# Patient Record
Sex: FEMALE | Race: WHITE | Employment: OTHER | URBAN - METROPOLITAN AREA
[De-identification: names, ages, dates, MRNs, and addresses within clinical notes are randomized per-mention and may not be internally consistent; named-entity substitution may affect disease eponyms.]

---

## 2017-04-03 RX ORDER — MELOXICAM 15 MG/1
TABLET ORAL
Qty: 90 TABLET | Refills: 1 | Status: SHIPPED | OUTPATIENT
Start: 2017-04-03 | End: 2017-04-21 | Stop reason: SDUPTHER

## 2017-04-18 ENCOUNTER — OFFICE VISIT (OUTPATIENT)
Dept: PRIMARY CARE CLINIC | Age: 52
End: 2017-04-18

## 2017-04-18 VITALS
DIASTOLIC BLOOD PRESSURE: 76 MMHG | HEART RATE: 72 BPM | RESPIRATION RATE: 14 BRPM | HEIGHT: 62 IN | SYSTOLIC BLOOD PRESSURE: 114 MMHG | TEMPERATURE: 98 F | BODY MASS INDEX: 24.01 KG/M2 | WEIGHT: 130.5 LBS

## 2017-04-18 DIAGNOSIS — Z80.0 FAMILY HX OF COLON CANCER: ICD-10-CM

## 2017-04-18 DIAGNOSIS — G89.29 CHRONIC BACK PAIN, UNSPECIFIED BACK LOCATION, UNSPECIFIED BACK PAIN LATERALITY: ICD-10-CM

## 2017-04-18 DIAGNOSIS — Z12.31 ENCOUNTER FOR SCREENING MAMMOGRAM FOR BREAST CANCER: ICD-10-CM

## 2017-04-18 DIAGNOSIS — R63.4 WEIGHT LOSS, NON-INTENTIONAL: ICD-10-CM

## 2017-04-18 DIAGNOSIS — C50.912 MALIGNANT NEOPLASM OF LEFT FEMALE BREAST, UNSPECIFIED SITE OF BREAST: ICD-10-CM

## 2017-04-18 DIAGNOSIS — K63.5 COLON POLYPS: Primary | ICD-10-CM

## 2017-04-18 DIAGNOSIS — R23.3 BRUISES EASILY: ICD-10-CM

## 2017-04-18 DIAGNOSIS — D68.9 COAGULOPATHY (HCC): ICD-10-CM

## 2017-04-18 DIAGNOSIS — R53.83 OTHER FATIGUE: ICD-10-CM

## 2017-04-18 DIAGNOSIS — Z11.4 SCREENING FOR HIV (HUMAN IMMUNODEFICIENCY VIRUS): ICD-10-CM

## 2017-04-18 DIAGNOSIS — Z12.11 SCREENING FOR COLON CANCER: ICD-10-CM

## 2017-04-18 DIAGNOSIS — M54.9 CHRONIC BACK PAIN, UNSPECIFIED BACK LOCATION, UNSPECIFIED BACK PAIN LATERALITY: ICD-10-CM

## 2017-04-18 DIAGNOSIS — D22.9 INTRADERMAL MELANOCYTIC NEVUS: ICD-10-CM

## 2017-04-18 DIAGNOSIS — Z12.4 CERVICAL CANCER SCREENING: ICD-10-CM

## 2017-04-18 DIAGNOSIS — G89.4 CHRONIC PAIN SYNDROME: ICD-10-CM

## 2017-04-18 LAB
ALBUMIN SERPL-MCNC: 4.8 G/DL (ref 3.9–4.9)
ALP BLD-CCNC: 53 U/L (ref 40–130)
ALT SERPL-CCNC: 14 U/L (ref 0–33)
ANION GAP SERPL CALCULATED.3IONS-SCNC: 12 MEQ/L (ref 7–13)
APTT: 25.5 SEC (ref 21.6–35.4)
AST SERPL-CCNC: 23 U/L (ref 0–35)
BASOPHILS ABSOLUTE: 0 K/UL (ref 0–0.2)
BASOPHILS RELATIVE PERCENT: 0.4 %
BILIRUB SERPL-MCNC: 0.4 MG/DL (ref 0–1.2)
BUN BLDV-MCNC: 15 MG/DL (ref 6–20)
CALCIUM SERPL-MCNC: 10.1 MG/DL (ref 8.6–10.2)
CHLORIDE BLD-SCNC: 102 MEQ/L (ref 98–107)
CHOLESTEROL, TOTAL: 282 MG/DL (ref 0–199)
CO2: 29 MEQ/L (ref 22–29)
CREAT SERPL-MCNC: 0.52 MG/DL (ref 0.5–0.9)
EOSINOPHILS ABSOLUTE: 0 K/UL (ref 0–0.7)
EOSINOPHILS RELATIVE PERCENT: 0.8 %
GFR AFRICAN AMERICAN: >60
GFR NON-AFRICAN AMERICAN: >60
GLOBULIN: 2.4 G/DL (ref 2.3–3.5)
GLUCOSE BLD-MCNC: 109 MG/DL (ref 74–109)
HCT VFR BLD CALC: 43.5 % (ref 37–47)
HDLC SERPL-MCNC: 91 MG/DL (ref 40–59)
HEMOGLOBIN: 14.3 G/DL (ref 12–16)
INR BLD: 1
LDL CHOLESTEROL CALCULATED: 169 MG/DL (ref 0–129)
LYMPHOCYTES ABSOLUTE: 1.7 K/UL (ref 1–4.8)
LYMPHOCYTES RELATIVE PERCENT: 32.3 %
MCH RBC QN AUTO: 29.8 PG (ref 27–31.3)
MCHC RBC AUTO-ENTMCNC: 32.9 % (ref 33–37)
MCV RBC AUTO: 90.5 FL (ref 82–100)
MONOCYTES ABSOLUTE: 0.4 K/UL (ref 0.2–0.8)
MONOCYTES RELATIVE PERCENT: 8.2 %
NEUTROPHILS ABSOLUTE: 3.1 K/UL (ref 1.4–6.5)
NEUTROPHILS RELATIVE PERCENT: 58.3 %
PDW BLD-RTO: 14.8 % (ref 11.5–14.5)
PLATELET # BLD: 222 K/UL (ref 130–400)
POTASSIUM SERPL-SCNC: 3.7 MEQ/L (ref 3.5–5.1)
PROTHROMBIN TIME: 10.2 SEC (ref 8.1–13.7)
RBC # BLD: 4.81 M/UL (ref 4.2–5.4)
SODIUM BLD-SCNC: 143 MEQ/L (ref 132–144)
TOTAL PROTEIN: 7.2 G/DL (ref 6.4–8.1)
TRIGL SERPL-MCNC: 112 MG/DL (ref 0–200)
TSH SERPL DL<=0.05 MIU/L-ACNC: 3.03 UIU/ML (ref 0.27–4.2)
VITAMIN D 25-HYDROXY: 43.1 NG/ML (ref 30–100)
WBC # BLD: 5.4 K/UL (ref 4.8–10.8)

## 2017-04-18 PROCEDURE — 99214 OFFICE O/P EST MOD 30 MIN: CPT | Performed by: FAMILY MEDICINE

## 2017-04-18 RX ORDER — GABAPENTIN 600 MG/1
TABLET ORAL
COMMUNITY
End: 2017-05-30 | Stop reason: SDUPTHER

## 2017-04-18 RX ORDER — CYCLOBENZAPRINE HCL 10 MG
10 TABLET ORAL 3 TIMES DAILY PRN
Qty: 90 TABLET | Refills: 5 | Status: SHIPPED | OUTPATIENT
Start: 2017-04-18 | End: 2017-04-21 | Stop reason: SDUPTHER

## 2017-04-18 RX ORDER — DOCUSATE SODIUM 100 MG/1
100 CAPSULE, LIQUID FILLED ORAL 2 TIMES DAILY
COMMUNITY
End: 2018-02-19

## 2017-04-18 ASSESSMENT — ENCOUNTER SYMPTOMS
EYES NEGATIVE: 1
ABDOMINAL PAIN: 0
RESPIRATORY NEGATIVE: 1

## 2017-04-20 LAB — HIV-1 WESTERN BLOT: NEGATIVE

## 2017-04-21 DIAGNOSIS — M54.9 CHRONIC BACK PAIN, UNSPECIFIED BACK LOCATION, UNSPECIFIED BACK PAIN LATERALITY: ICD-10-CM

## 2017-04-21 DIAGNOSIS — G89.29 CHRONIC BACK PAIN, UNSPECIFIED BACK LOCATION, UNSPECIFIED BACK PAIN LATERALITY: ICD-10-CM

## 2017-04-24 RX ORDER — CYCLOBENZAPRINE HCL 10 MG
10 TABLET ORAL 3 TIMES DAILY PRN
Qty: 270 TABLET | Refills: 0 | Status: SHIPPED | OUTPATIENT
Start: 2017-04-24 | End: 2019-08-06

## 2017-04-24 RX ORDER — MELOXICAM 15 MG/1
TABLET ORAL
Qty: 90 TABLET | Refills: 1 | Status: SHIPPED | OUTPATIENT
Start: 2017-04-24 | End: 2017-05-30 | Stop reason: SDUPTHER

## 2017-04-27 ENCOUNTER — HOSPITAL ENCOUNTER (OUTPATIENT)
Dept: CT IMAGING | Age: 52
Discharge: HOME | End: 2017-04-27
Payer: MEDICARE

## 2017-04-27 DIAGNOSIS — R63.4 WEIGHT LOSS, NON-INTENTIONAL: ICD-10-CM

## 2017-04-27 DIAGNOSIS — C50.912 MALIGNANT NEOPLASM OF LEFT FEMALE BREAST, UNSPECIFIED SITE OF BREAST: ICD-10-CM

## 2017-04-27 PROCEDURE — 6360000004 HC RX CONTRAST MEDICATION: Performed by: RADIOLOGY

## 2017-04-27 PROCEDURE — 2500000003 HC RX 250 WO HCPCS: Performed by: RADIOLOGY

## 2017-04-27 PROCEDURE — 74177 CT ABD & PELVIS W/CONTRAST: CPT

## 2017-04-27 RX ADMIN — IOVERSOL 100 ML: 678 INJECTION INTRA-ARTERIAL; INTRAVENOUS at 11:31

## 2017-04-27 RX ADMIN — BARIUM SULFATE 450 ML: 21 SUSPENSION ORAL at 11:31

## 2017-05-01 ENCOUNTER — HOSPITAL ENCOUNTER (OUTPATIENT)
Dept: WOMENS IMAGING | Age: 52
Discharge: HOME | End: 2017-05-01
Payer: MEDICARE

## 2017-05-01 DIAGNOSIS — Z12.31 ENCOUNTER FOR SCREENING MAMMOGRAM FOR BREAST CANCER: ICD-10-CM

## 2017-05-01 PROCEDURE — G0202 SCR MAMMO BI INCL CAD: HCPCS

## 2017-05-04 ENCOUNTER — OFFICE VISIT (OUTPATIENT)
Dept: PRIMARY CARE CLINIC | Age: 52
End: 2017-05-04

## 2017-05-04 VITALS
WEIGHT: 130 LBS | HEIGHT: 62 IN | HEART RATE: 72 BPM | BODY MASS INDEX: 23.92 KG/M2 | TEMPERATURE: 97.9 F | DIASTOLIC BLOOD PRESSURE: 76 MMHG | SYSTOLIC BLOOD PRESSURE: 108 MMHG | RESPIRATION RATE: 12 BRPM

## 2017-05-04 DIAGNOSIS — K29.70 GASTRITIS WITHOUT BLEEDING, UNSPECIFIED CHRONICITY, UNSPECIFIED GASTRITIS TYPE: ICD-10-CM

## 2017-05-04 DIAGNOSIS — R06.2 WHEEZING: ICD-10-CM

## 2017-05-04 DIAGNOSIS — F32.A DEPRESSION, UNSPECIFIED DEPRESSION TYPE: ICD-10-CM

## 2017-05-04 DIAGNOSIS — J40 BRONCHITIS: ICD-10-CM

## 2017-05-04 DIAGNOSIS — J30.9 ALLERGIC RHINITIS, UNSPECIFIED ALLERGIC RHINITIS TRIGGER, UNSPECIFIED RHINITIS SEASONALITY: Primary | ICD-10-CM

## 2017-05-04 PROCEDURE — 99213 OFFICE O/P EST LOW 20 MIN: CPT | Performed by: FAMILY MEDICINE

## 2017-05-04 PROCEDURE — G0444 DEPRESSION SCREEN ANNUAL: HCPCS | Performed by: FAMILY MEDICINE

## 2017-05-04 RX ORDER — ALBUTEROL SULFATE 90 UG/1
2 AEROSOL, METERED RESPIRATORY (INHALATION) EVERY 6 HOURS PRN
Qty: 1 INHALER | Refills: 3 | Status: SHIPPED | OUTPATIENT
Start: 2017-05-04 | End: 2018-02-19 | Stop reason: SDUPTHER

## 2017-05-04 RX ORDER — CEFUROXIME AXETIL 500 MG/1
500 TABLET ORAL 2 TIMES DAILY
Qty: 20 TABLET | Refills: 0 | Status: SHIPPED | OUTPATIENT
Start: 2017-05-04 | End: 2017-05-14

## 2017-05-04 RX ORDER — MONTELUKAST SODIUM 10 MG/1
TABLET ORAL
Qty: 90 TABLET | Refills: 3 | Status: SHIPPED | OUTPATIENT
Start: 2017-05-04 | End: 2018-02-19 | Stop reason: SDUPTHER

## 2017-05-04 RX ORDER — MONTELUKAST SODIUM 10 MG/1
10 TABLET ORAL NIGHTLY
Qty: 30 TABLET | Refills: 3 | Status: SHIPPED | OUTPATIENT
Start: 2017-05-04 | End: 2017-05-04 | Stop reason: SDUPTHER

## 2017-05-04 ASSESSMENT — PATIENT HEALTH QUESTIONNAIRE - PHQ9
6. FEELING BAD ABOUT YOURSELF - OR THAT YOU ARE A FAILURE OR HAVE LET YOURSELF OR YOUR FAMILY DOWN: 3
1. LITTLE INTEREST OR PLEASURE IN DOING THINGS: 3
10. IF YOU CHECKED OFF ANY PROBLEMS, HOW DIFFICULT HAVE THESE PROBLEMS MADE IT FOR YOU TO DO YOUR WORK, TAKE CARE OF THINGS AT HOME, OR GET ALONG WITH OTHER PEOPLE: 3
9. THOUGHTS THAT YOU WOULD BE BETTER OFF DEAD, OR OF HURTING YOURSELF: 2
2. FEELING DOWN, DEPRESSED OR HOPELESS: 3
4. FEELING TIRED OR HAVING LITTLE ENERGY: 3
5. POOR APPETITE OR OVEREATING: 3
SUM OF ALL RESPONSES TO PHQ QUESTIONS 1-9: 24
7. TROUBLE CONCENTRATING ON THINGS, SUCH AS READING THE NEWSPAPER OR WATCHING TELEVISION: 3
3. TROUBLE FALLING OR STAYING ASLEEP: 3
8. MOVING OR SPEAKING SO SLOWLY THAT OTHER PEOPLE COULD HAVE NOTICED. OR THE OPPOSITE, BEING SO FIGETY OR RESTLESS THAT YOU HAVE BEEN MOVING AROUND A LOT MORE THAN USUAL: 1
SUM OF ALL RESPONSES TO PHQ9 QUESTIONS 1 & 2: 6

## 2017-05-04 ASSESSMENT — ENCOUNTER SYMPTOMS
RHINORRHEA: 1
ABDOMINAL PAIN: 1
EYES NEGATIVE: 1
COUGH: 1
SINUS PRESSURE: 1

## 2017-05-09 ENCOUNTER — OFFICE VISIT (OUTPATIENT)
Dept: GASTROENTEROLOGY | Age: 52
End: 2017-05-09

## 2017-05-09 VITALS
DIASTOLIC BLOOD PRESSURE: 78 MMHG | HEART RATE: 76 BPM | BODY MASS INDEX: 23.05 KG/M2 | SYSTOLIC BLOOD PRESSURE: 106 MMHG | WEIGHT: 126 LBS

## 2017-05-09 DIAGNOSIS — R93.5 ABNORMAL CT OF THE ABDOMEN: ICD-10-CM

## 2017-05-09 DIAGNOSIS — R63.4 WEIGHT LOSS: ICD-10-CM

## 2017-05-09 DIAGNOSIS — R63.0 ANOREXIA: Primary | ICD-10-CM

## 2017-05-09 PROCEDURE — 99203 OFFICE O/P NEW LOW 30 MIN: CPT | Performed by: INTERNAL MEDICINE

## 2017-05-10 PROBLEM — M47.817 LUMBOSACRAL SPONDYLOSIS WITHOUT MYELOPATHY: Status: ACTIVE | Noted: 2017-05-10

## 2017-05-10 PROBLEM — M54.16 LUMBAR RADICULOPATHY: Status: ACTIVE | Noted: 2017-05-10

## 2017-05-10 PROBLEM — M96.1 POSTLAMINECTOMY SYNDROME, LUMBAR: Status: ACTIVE | Noted: 2017-05-10

## 2017-05-11 ENCOUNTER — ANESTHESIA EVENT (OUTPATIENT)
Dept: ENDOSCOPY | Age: 52
End: 2017-05-11
Payer: MEDICARE

## 2017-05-11 ENCOUNTER — ANESTHESIA (OUTPATIENT)
Dept: ENDOSCOPY | Age: 52
End: 2017-05-11
Payer: MEDICARE

## 2017-05-11 ENCOUNTER — HOSPITAL ENCOUNTER (OUTPATIENT)
Age: 52
Setting detail: OUTPATIENT SURGERY
Discharge: HOME | End: 2017-05-11
Attending: INTERNAL MEDICINE | Admitting: INTERNAL MEDICINE
Payer: MEDICARE

## 2017-05-11 VITALS
OXYGEN SATURATION: 100 % | SYSTOLIC BLOOD PRESSURE: 110 MMHG | RESPIRATION RATE: 16 BRPM | DIASTOLIC BLOOD PRESSURE: 70 MMHG

## 2017-05-11 VITALS
DIASTOLIC BLOOD PRESSURE: 75 MMHG | WEIGHT: 122 LBS | BODY MASS INDEX: 21.62 KG/M2 | OXYGEN SATURATION: 96 % | SYSTOLIC BLOOD PRESSURE: 99 MMHG | HEART RATE: 71 BPM | TEMPERATURE: 97.9 F | HEIGHT: 63 IN | RESPIRATION RATE: 16 BRPM

## 2017-05-11 PROCEDURE — 3609017100 HC EGD: Performed by: INTERNAL MEDICINE

## 2017-05-11 PROCEDURE — 3700000001 HC ADD 15 MINUTES (ANESTHESIA): Performed by: INTERNAL MEDICINE

## 2017-05-11 PROCEDURE — 3700000000 HC ANESTHESIA ATTENDED CARE: Performed by: INTERNAL MEDICINE

## 2017-05-11 PROCEDURE — 88342 IMHCHEM/IMCYTCHM 1ST ANTB: CPT

## 2017-05-11 PROCEDURE — 2580000003 HC RX 258: Performed by: STUDENT IN AN ORGANIZED HEALTH CARE EDUCATION/TRAINING PROGRAM

## 2017-05-11 PROCEDURE — 88305 TISSUE EXAM BY PATHOLOGIST: CPT

## 2017-05-11 PROCEDURE — 2580000003 HC RX 258: Performed by: NURSE ANESTHETIST, CERTIFIED REGISTERED

## 2017-05-11 PROCEDURE — 7100000010 HC PHASE II RECOVERY - FIRST 15 MIN: Performed by: INTERNAL MEDICINE

## 2017-05-11 PROCEDURE — 6360000002 HC RX W HCPCS: Performed by: NURSE ANESTHETIST, CERTIFIED REGISTERED

## 2017-05-11 RX ORDER — ONDANSETRON 2 MG/ML
4 INJECTION INTRAMUSCULAR; INTRAVENOUS
Status: DISCONTINUED | OUTPATIENT
Start: 2017-05-11 | End: 2017-05-11 | Stop reason: HOSPADM

## 2017-05-11 RX ORDER — SODIUM CHLORIDE 9 MG/ML
INJECTION, SOLUTION INTRAVENOUS CONTINUOUS
Status: DISCONTINUED | OUTPATIENT
Start: 2017-05-11 | End: 2017-05-11 | Stop reason: HOSPADM

## 2017-05-11 RX ORDER — SODIUM CHLORIDE 9 MG/ML
INJECTION, SOLUTION INTRAVENOUS CONTINUOUS PRN
Status: DISCONTINUED | OUTPATIENT
Start: 2017-05-11 | End: 2017-05-11 | Stop reason: SDUPTHER

## 2017-05-11 RX ORDER — PROPOFOL 10 MG/ML
INJECTION, EMULSION INTRAVENOUS PRN
Status: DISCONTINUED | OUTPATIENT
Start: 2017-05-11 | End: 2017-05-11 | Stop reason: SDUPTHER

## 2017-05-11 RX ADMIN — SODIUM CHLORIDE: 9 INJECTION, SOLUTION INTRAVENOUS at 07:27

## 2017-05-11 RX ADMIN — PROPOFOL 20 MG: 10 INJECTION, EMULSION INTRAVENOUS at 08:16

## 2017-05-11 RX ADMIN — PROPOFOL 20 MG: 10 INJECTION, EMULSION INTRAVENOUS at 08:15

## 2017-05-11 RX ADMIN — PROPOFOL 20 MG: 10 INJECTION, EMULSION INTRAVENOUS at 08:13

## 2017-05-11 RX ADMIN — PROPOFOL 50 MG: 10 INJECTION, EMULSION INTRAVENOUS at 08:09

## 2017-05-11 RX ADMIN — PROPOFOL 50 MG: 10 INJECTION, EMULSION INTRAVENOUS at 08:10

## 2017-05-11 RX ADMIN — PROPOFOL 20 MG: 10 INJECTION, EMULSION INTRAVENOUS at 08:12

## 2017-05-11 RX ADMIN — PROPOFOL 20 MG: 10 INJECTION, EMULSION INTRAVENOUS at 08:14

## 2017-05-11 RX ADMIN — SODIUM CHLORIDE: 900 INJECTION, SOLUTION INTRAVENOUS at 08:06

## 2017-05-11 RX ADMIN — PROPOFOL 30 MG: 10 INJECTION, EMULSION INTRAVENOUS at 08:11

## 2017-05-11 ASSESSMENT — PAIN - FUNCTIONAL ASSESSMENT: PAIN_FUNCTIONAL_ASSESSMENT: 0-10

## 2017-05-11 ASSESSMENT — PAIN DESCRIPTION - DESCRIPTORS: DESCRIPTORS: ACHING

## 2017-05-15 ENCOUNTER — OFFICE VISIT (OUTPATIENT)
Dept: OBGYN | Age: 52
End: 2017-05-15

## 2017-05-15 VITALS
DIASTOLIC BLOOD PRESSURE: 76 MMHG | SYSTOLIC BLOOD PRESSURE: 100 MMHG | BODY MASS INDEX: 22.82 KG/M2 | HEIGHT: 62 IN | WEIGHT: 124 LBS

## 2017-05-15 DIAGNOSIS — Z01.419 WELL WOMAN EXAM WITH ROUTINE GYNECOLOGICAL EXAM: Primary | ICD-10-CM

## 2017-05-15 DIAGNOSIS — Z11.3 SCREENING FOR STD (SEXUALLY TRANSMITTED DISEASE): ICD-10-CM

## 2017-05-15 DIAGNOSIS — Z11.51 SCREENING FOR HPV (HUMAN PAPILLOMAVIRUS): ICD-10-CM

## 2017-05-15 LAB — PAP SMEAR: NORMAL

## 2017-05-15 PROCEDURE — G0101 CA SCREEN;PELVIC/BREAST EXAM: HCPCS | Performed by: OBSTETRICS & GYNECOLOGY

## 2017-05-15 RX ORDER — NICOTINE POLACRILEX 4 MG/1
20 GUM, CHEWING ORAL DAILY
Qty: 90 TABLET | Refills: 4 | Status: SHIPPED | OUTPATIENT
Start: 2017-05-15 | End: 2017-06-08 | Stop reason: SDUPTHER

## 2017-05-17 LAB — HIV-1 AND HIV-2 ANTIBODIES: NEGATIVE

## 2017-05-30 DIAGNOSIS — Z11.51 SCREENING FOR HPV (HUMAN PAPILLOMAVIRUS): ICD-10-CM

## 2017-05-30 DIAGNOSIS — Z01.419 WELL WOMAN EXAM WITH ROUTINE GYNECOLOGICAL EXAM: ICD-10-CM

## 2017-06-08 RX ORDER — NICOTINE POLACRILEX 4 MG/1
20 GUM, CHEWING ORAL 2 TIMES DAILY
Qty: 180 TABLET | Refills: 4 | Status: SHIPPED | OUTPATIENT
Start: 2017-06-08 | End: 2018-02-19 | Stop reason: SDUPTHER

## 2017-06-14 ENCOUNTER — TELEPHONE (OUTPATIENT)
Dept: OBGYN | Age: 52
End: 2017-06-14

## 2017-06-15 ENCOUNTER — HOSPITAL ENCOUNTER (OUTPATIENT)
Dept: NUCLEAR MEDICINE | Age: 52
Discharge: HOME | End: 2017-06-15
Payer: MEDICARE

## 2017-06-15 ENCOUNTER — HOSPITAL ENCOUNTER (OUTPATIENT)
Dept: GENERAL RADIOLOGY | Age: 52
Discharge: HOME | End: 2017-06-15
Payer: MEDICARE

## 2017-06-15 ENCOUNTER — HOSPITAL ENCOUNTER (OUTPATIENT)
Dept: NUCLEAR MEDICINE | Age: 52
End: 2017-06-15
Payer: MEDICARE

## 2017-06-15 DIAGNOSIS — M54.5 CHRONIC LOW BACK PAIN, UNSPECIFIED BACK PAIN LATERALITY, WITH SCIATICA PRESENCE UNSPECIFIED: ICD-10-CM

## 2017-06-15 DIAGNOSIS — G89.29 CHRONIC LOW BACK PAIN, UNSPECIFIED BACK PAIN LATERALITY, WITH SCIATICA PRESENCE UNSPECIFIED: ICD-10-CM

## 2017-06-15 DIAGNOSIS — Z85.3 HISTORY OF LEFT BREAST CANCER: ICD-10-CM

## 2017-06-15 PROCEDURE — 78306 BONE IMAGING WHOLE BODY: CPT

## 2017-06-15 PROCEDURE — A9503 TC99M MEDRONATE: HCPCS | Performed by: INTERNAL MEDICINE

## 2017-06-15 PROCEDURE — 77080 DXA BONE DENSITY AXIAL: CPT

## 2017-06-15 PROCEDURE — 3430000000 HC RX DIAGNOSTIC RADIOPHARMACEUTICAL: Performed by: INTERNAL MEDICINE

## 2017-06-15 RX ORDER — TC 99M MEDRONATE 20 MG/10ML
30 INJECTION, POWDER, LYOPHILIZED, FOR SOLUTION INTRAVENOUS
Status: COMPLETED | OUTPATIENT
Start: 2017-06-15 | End: 2017-06-15

## 2017-06-15 RX ADMIN — Medication 30 MILLICURIE: at 11:30

## 2017-07-21 ENCOUNTER — HOSPITAL ENCOUNTER (OUTPATIENT)
Dept: CT IMAGING | Age: 52
Discharge: HOME | End: 2017-07-21
Payer: MEDICARE

## 2017-07-21 DIAGNOSIS — C50.912 MALIGNANT NEOPLASM OF LEFT FEMALE BREAST, UNSPECIFIED SITE OF BREAST: ICD-10-CM

## 2017-07-21 PROCEDURE — 71250 CT THORAX DX C-: CPT

## 2017-08-24 ENCOUNTER — APPOINTMENT (OUTPATIENT)
Dept: NUCLEAR MEDICINE | Age: 52
End: 2017-08-24
Payer: MEDICARE

## 2017-08-24 ENCOUNTER — HOSPITAL ENCOUNTER (OUTPATIENT)
Dept: NUCLEAR MEDICINE | Age: 52
Discharge: HOME | End: 2017-08-24
Payer: MEDICARE

## 2017-08-24 DIAGNOSIS — C50.912 MALIGNANT NEOPLASM OF LEFT FEMALE BREAST, UNSPECIFIED SITE OF BREAST: ICD-10-CM

## 2017-08-24 PROCEDURE — 78306 BONE IMAGING WHOLE BODY: CPT

## 2017-08-24 PROCEDURE — A9503 TC99M MEDRONATE: HCPCS | Performed by: INTERNAL MEDICINE

## 2017-08-24 PROCEDURE — 3430000000 HC RX DIAGNOSTIC RADIOPHARMACEUTICAL: Performed by: INTERNAL MEDICINE

## 2017-08-24 RX ORDER — TC 99M MEDRONATE 20 MG/10ML
25 INJECTION, POWDER, LYOPHILIZED, FOR SOLUTION INTRAVENOUS
Status: COMPLETED | OUTPATIENT
Start: 2017-08-24 | End: 2017-08-24

## 2017-08-24 RX ADMIN — Medication 25 MILLICURIE: at 11:15

## 2017-10-31 RX ORDER — HYDROCHLOROTHIAZIDE 25 MG/1
TABLET ORAL
Qty: 45 TABLET | Refills: 1 | Status: SHIPPED | OUTPATIENT
Start: 2017-10-31 | End: 2018-02-19 | Stop reason: SDUPTHER

## 2018-02-19 ENCOUNTER — OFFICE VISIT (OUTPATIENT)
Dept: PRIMARY CARE CLINIC | Age: 53
End: 2018-02-19
Payer: COMMERCIAL

## 2018-02-19 VITALS
HEART RATE: 74 BPM | SYSTOLIC BLOOD PRESSURE: 118 MMHG | HEIGHT: 63 IN | TEMPERATURE: 97.6 F | BODY MASS INDEX: 21.62 KG/M2 | WEIGHT: 122 LBS | DIASTOLIC BLOOD PRESSURE: 82 MMHG | RESPIRATION RATE: 16 BRPM

## 2018-02-19 DIAGNOSIS — R06.2 WHEEZING: ICD-10-CM

## 2018-02-19 DIAGNOSIS — R06.02 SHORTNESS OF BREATH: ICD-10-CM
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
## 2018-02-19 DIAGNOSIS — I10 ESSENTIAL HYPERTENSION: ICD-10-CM

## 2018-02-19 DIAGNOSIS — H69.81 DYSFUNCTION OF RIGHT EUSTACHIAN TUBE: ICD-10-CM

## 2018-02-19 DIAGNOSIS — R07.81 RIB PAIN ON LEFT SIDE: ICD-10-CM

## 2018-02-19 DIAGNOSIS — F32.5 MAJOR DEPRESSION, SINGLE EPISODE, IN COMPLETE REMISSION (HCC): ICD-10-CM

## 2018-02-19 PROCEDURE — 99214 OFFICE O/P EST MOD 30 MIN: CPT | Performed by: FAMILY MEDICINE

## 2018-02-19 RX ORDER — NICOTINE POLACRILEX 4 MG/1
20 GUM, CHEWING ORAL 2 TIMES DAILY
Qty: 180 TABLET | Refills: 4 | Status: SHIPPED | OUTPATIENT
Start: 2018-02-19 | End: 2018-04-17 | Stop reason: SDUPTHER

## 2018-02-19 RX ORDER — ALBUTEROL SULFATE 90 UG/1
2 AEROSOL, METERED RESPIRATORY (INHALATION) EVERY 6 HOURS PRN
Qty: 1 INHALER | Refills: 3 | Status: SHIPPED | OUTPATIENT
Start: 2018-02-19 | End: 2019-02-21 | Stop reason: SDUPTHER

## 2018-02-19 RX ORDER — MELOXICAM 15 MG/1
15 TABLET ORAL DAILY
Qty: 90 TABLET | Refills: 0 | Status: SHIPPED | OUTPATIENT
Start: 2018-02-19 | End: 2018-03-05 | Stop reason: ALTCHOICE

## 2018-02-19 RX ORDER — HYDROCHLOROTHIAZIDE 25 MG/1
TABLET ORAL
Qty: 45 TABLET | Refills: 1 | Status: SHIPPED | OUTPATIENT
Start: 2018-02-19 | End: 2018-08-16 | Stop reason: SDUPTHER

## 2018-02-19 RX ORDER — MONTELUKAST SODIUM 10 MG/1
TABLET ORAL
Qty: 90 TABLET | Refills: 3 | Status: SHIPPED | OUTPATIENT
Start: 2018-02-19 | End: 2019-02-21 | Stop reason: SDUPTHER

## 2018-02-19 RX ORDER — LISINOPRIL 20 MG/1
10 TABLET ORAL DAILY
Qty: 90 TABLET | Refills: 3 | Status: SHIPPED | OUTPATIENT
Start: 2018-02-19 | End: 2019-02-21 | Stop reason: SDUPTHER

## 2018-02-19 RX ORDER — AZELASTINE 1 MG/ML
2 SPRAY, METERED NASAL 2 TIMES DAILY
Qty: 1 BOTTLE | Refills: 3 | Status: SHIPPED | OUTPATIENT
Start: 2018-02-19 | End: 2018-05-14

## 2018-02-19 RX ORDER — DICLOFENAC SODIUM 75 MG/1
75 TABLET, DELAYED RELEASE ORAL 2 TIMES DAILY
COMMUNITY
End: 2019-02-21 | Stop reason: SDUPTHER

## 2018-02-19 RX ORDER — GABAPENTIN 600 MG/1
TABLET ORAL
Qty: 120 TABLET | Refills: 2 | Status: SHIPPED | OUTPATIENT
Start: 2018-02-19 | End: 2018-06-22 | Stop reason: SDUPTHER

## 2018-02-19 ASSESSMENT — ENCOUNTER SYMPTOMS
ABDOMINAL PAIN: 0
BOWEL INCONTINENCE: 0
EYE DISCHARGE: 0
APNEA: 0
EYE PAIN: 0
CONSTIPATION: 0
WHEEZING: 0
FACIAL SWELLING: 0
STRIDOR: 0
DIARRHEA: 0
CHEST TIGHTNESS: 0
COLOR CHANGE: 0
NAUSEA: 0
CHOKING: 0
PHOTOPHOBIA: 0
VOMITING: 0
VISUAL CHANGE: 0
EYE REDNESS: 0

## 2018-02-19 NOTE — PROGRESS NOTES
hematuria. Musculoskeletal: Negative for gait problem, joint swelling, neck pain and neck stiffness. Skin: Negative for color change, pallor, rash and wound. Allergic/Immunologic: Negative for immunocompromised state. Neurological: Negative for tingling, tremors, loss of consciousness, syncope, facial asymmetry, speech difficulty, numbness and headaches. Psychiatric/Behavioral: Negative for confusion and hallucinations. The patient is not nervous/anxious and is not hyperactive. Objective:   /82 (Site: Right Arm, Position: Sitting, Cuff Size: Medium Adult)   Pulse 74   Temp 97.6 °F (36.4 °C) (Tympanic)   Resp 16   Ht 5' 3\" (1.6 m)   Wt 122 lb (55.3 kg)   Breastfeeding? No   BMI 21.61 kg/m²     Physical Exam   Constitutional: She is oriented to person, place, and time. She appears well-developed and well-nourished. No distress. HENT:   Head: Normocephalic and atraumatic. Right Ear: Hearing and external ear normal. No drainage, swelling or tenderness. Tympanic membrane is not injected and not bulging. A middle ear effusion is present. No hemotympanum. Left Ear: Hearing, tympanic membrane and external ear normal. No drainage, swelling or tenderness. Tympanic membrane is not injected and not bulging. No hemotympanum. Nose: No mucosal edema, rhinorrhea, sinus tenderness or nasal deformity. No epistaxis. Right sinus exhibits no maxillary sinus tenderness. Left sinus exhibits no maxillary sinus tenderness. Mouth/Throat: Uvula is midline and mucous membranes are normal. No oropharyngeal exudate or posterior oropharyngeal erythema. Eyes: Conjunctivae and EOM are normal. Pupils are equal, round, and reactive to light. Right eye exhibits no discharge. Left eye exhibits no discharge. Neck: Trachea normal. No JVD present. No thyroid mass and no thyromegaly present. Cardiovascular: Normal rate, regular rhythm, normal heart sounds and intact distal pulses.   Exam reveals no gallop and no friction rub. No murmur heard. Pulmonary/Chest: Effort normal. No respiratory distress. She has wheezes. She has no rales. She exhibits tenderness. Lymphadenopathy:     She has no cervical adenopathy. Neurological: She is alert and oriented to person, place, and time. Coordination normal.   Skin: Skin is warm and dry. She is not diaphoretic. Psychiatric: She has a normal mood and affect. Her behavior is normal. Judgment and thought content normal.   Nursing note and vitals reviewed. Assessment:     1. Fall, initial encounter, 2/10/18     2. Rib pain on left side  XR RIBS LEFT (2 VIEWS)   3. Shortness of breath  XR RIBS LEFT (2 VIEWS)   4. Wheezing  XR RIBS LEFT (2 VIEWS)   5. Dysfunction of right eustachian tube  azelastine (ASTELIN) 0.1 % nasal spray   6.  Essential hypertension  lisinopril (PRINIVIL;ZESTRIL) 20 MG tablet       Plan:      Orders Placed This Encounter   Procedures    XR RIBS LEFT (2 VIEWS)     Standing Status:   Future     Standing Expiration Date:   2/19/2019     Order Specific Question:   Reason for exam:     Answer:   left sided rib pain     Orders Placed This Encounter   Medications    lisinopril (PRINIVIL;ZESTRIL) 20 MG tablet     Sig: Take 0.5 tablets by mouth daily     Dispense:  90 tablet     Refill:  3    montelukast (SINGULAIR) 10 MG tablet     Sig: TAKE 1 TABLET BY MOUTH EVERY NIGHT     Dispense:  90 tablet     Refill:  3     **Patient requests 90 days supply**    albuterol sulfate HFA (PROAIR HFA) 108 (90 Base) MCG/ACT inhaler     Sig: Inhale 2 puffs into the lungs every 6 hours as needed for Wheezing     Dispense:  1 Inhaler     Refill:  3    meloxicam (MOBIC) 15 MG tablet     Sig: Take 1 tablet by mouth daily TAKE 1 TABLET BY MOUTH DAILY     Dispense:  90 tablet     Refill:  0     **Patient requests 90 days supply**    omeprazole 20 MG EC tablet     Sig: Take 1 tablet by mouth 2 times daily     Dispense:  180 tablet     Refill:  4    gabapentin (NEURONTIN) 600

## 2018-03-05 ENCOUNTER — OFFICE VISIT (OUTPATIENT)
Dept: PRIMARY CARE CLINIC | Age: 53
End: 2018-03-05
Payer: COMMERCIAL

## 2018-03-05 VITALS
HEIGHT: 63 IN | SYSTOLIC BLOOD PRESSURE: 108 MMHG | BODY MASS INDEX: 21.26 KG/M2 | DIASTOLIC BLOOD PRESSURE: 72 MMHG | HEART RATE: 84 BPM | TEMPERATURE: 98.1 F | RESPIRATION RATE: 14 BRPM | WEIGHT: 120 LBS

## 2018-03-05 DIAGNOSIS — Z12.11 SCREEN FOR COLON CANCER: ICD-10-CM

## 2018-03-05 DIAGNOSIS — R10.11 RIGHT UPPER QUADRANT ABDOMINAL PAIN: ICD-10-CM

## 2018-03-05 DIAGNOSIS — H69.81 DYSFUNCTION OF RIGHT EUSTACHIAN TUBE: ICD-10-CM
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
## 2018-03-05 DIAGNOSIS — S22.32XD CLOSED FRACTURE OF ONE RIB OF LEFT SIDE WITH ROUTINE HEALING, SUBSEQUENT ENCOUNTER: ICD-10-CM

## 2018-03-05 DIAGNOSIS — R07.81 RIB PAIN ON LEFT SIDE: ICD-10-CM

## 2018-03-05 DIAGNOSIS — R06.02 SHORTNESS OF BREATH: ICD-10-CM

## 2018-03-05 PROCEDURE — 99214 OFFICE O/P EST MOD 30 MIN: CPT | Performed by: FAMILY MEDICINE

## 2018-03-05 ASSESSMENT — ENCOUNTER SYMPTOMS
VOMITING: 0
FACIAL SWELLING: 0
CHOKING: 0
PHOTOPHOBIA: 0
EYE REDNESS: 0
HEMOPTYSIS: 0
ORTHOPNEA: 0
COUGH: 0
SORE THROAT: 1
ABDOMINAL PAIN: 0
SPUTUM PRODUCTION: 0
CONSTIPATION: 0
NAUSEA: 0
BACK PAIN: 0
APNEA: 0
STRIDOR: 0
DIARRHEA: 0
WHEEZING: 0
SHORTNESS OF BREATH: 0
COLOR CHANGE: 0
EYE DISCHARGE: 0
RHINORRHEA: 0
EYE PAIN: 0
CHEST TIGHTNESS: 0

## 2018-03-05 NOTE — PROGRESS NOTES
Subjective:      Patient ID: Houston Camacho is a 47 y.o. female who presents today for:  Chief Complaint   Patient presents with    Chest Pain     f/u on left rib pain caused from fall. Patient would like to review xray done 02/20/18. Patient states her pain has improved but hears popping when taking a deep breath.  Otalgia     f/u on right ear ETD. Patient was started on Astelin nasal spray last visit. She states she does not hear the fullness anymore but still has some tenderness. She c/o popping intermittently as well as post nasal drip and fatigue. Chest Pain    This is a recurrent problem. The current episode started 1 to 4 weeks ago. The problem occurs daily. The problem has been gradually improving. The pain is at a severity of 2/10. The pain is mild. Pertinent negatives include no abdominal pain, back pain, claudication, cough, diaphoresis, dizziness, exertional chest pressure, fever, headaches, hemoptysis, irregular heartbeat, leg pain, lower extremity edema, malaise/fatigue, nausea, near-syncope, numbness, orthopnea, palpitations, PND, shortness of breath, sputum production, syncope, vomiting or weakness. The pain is aggravated by breathing and deep breathing. Treatments tried: voltaren. The treatment provided mild relief. Otalgia    There is pain in the right ear. This is a recurrent problem. The current episode started 1 to 4 weeks ago. The problem occurs every few minutes. The problem has been gradually improving. There has been no fever. The pain is at a severity of 5/10. The pain is moderate. Associated symptoms include a sore throat. Pertinent negatives include no abdominal pain, coughing, diarrhea, ear discharge, headaches, hearing loss, neck pain, rash, rhinorrhea or vomiting. Treatments tried: singulair, astelin nasal spray. The treatment provided mild relief. Her left rib x-ray from 2/20/18 was reviewed with her today.      Past Medical History:   Diagnosis Date    Anxiety     Bronchitis     Cancer ([facility] [address]     left breast    Chronic pain syndrome 10/6/2016    Colon polyps     Depression     suicide attempts    Duodenitis     Esophageal erosions     Family hx of colon cancer, Mother & Father 4/18/2017    Fibromyalgia     Foot deformity 10/6/2016    GERD (gastroesophageal reflux disease)     gastritis    Hypertension     Lumbar radiculopathy 5/10/2017    Osteoarthritis     TMJ arthritis 10/6/2016     Past Surgical History:   Procedure Laterality Date    BREAST SURGERY      breast bx/ lumpectomy    COLONOSCOPY      DILATION AND CURETTAGE OF UTERUS      ENDOSCOPY, COLON, DIAGNOSTIC      HYSTERECTOMY  2012    LAPAROSCOPY      LUMBAR DISCECTOMY      L4-L5    CO ESOPHAGOGASTRODUODENOSCOPY TRANSORAL DIAGNOSTIC N/A 5/11/2017    EGD ESOPHAGOGASTRODUODENOS[facility] [address] [facility] [address] [address]      UPPER GASTROINTESTINAL ENDOSCOPY       Family History   Problem Relation Age of Onset    Cancer Mother      colon    High Blood Pressure Mother     Heart Disease Mother     Cancer Father      colon    High Blood Pressure Father      Social History     Social History    Marital status:      Spouse name: N/A    Number of children: N/A    Years of education: N/A     Occupational History    Not on file. Social History Main Topics    Smoking status: Current Every Day Smoker     Types: Cigarettes     Last attempt to quit: 8/18/1993    Smokeless tobacco: Never Used    Alcohol use 0.0 oz/week      Comment: once in a while    Drug use: No    Sexual activity: Yes     Other Topics Concern    Not on file     Social History Narrative    No narrative on file     Allergies:  Ultram [tramadol]    Review of Systems   Constitutional: Positive for fatigue. Negative for activity change, appetite change, chills, diaphoresis, fever and malaise/fatigue.    HENT: Positive for ear pain, postnasal drip and sore throat. Negative for congestion, ear discharge, facial swelling, hearing loss, mouth sores and rhinorrhea. Eyes: Negative for photophobia, pain, discharge and redness. Respiratory: Negative for apnea, cough, hemoptysis, sputum production, choking, chest tightness, shortness of breath, wheezing and stridor. Cardiovascular: Positive for chest pain. Negative for palpitations, orthopnea, claudication, leg swelling, syncope, PND and near-syncope. Gastrointestinal: Negative for abdominal pain, constipation, diarrhea, nausea and vomiting. Endocrine: Negative for cold intolerance, heat intolerance, polydipsia and polyphagia. Genitourinary: Negative for dysuria and frequency. Musculoskeletal: Negative for back pain, gait problem, joint swelling, neck pain and neck stiffness. Skin: Negative for color change, pallor, rash and wound. Allergic/Immunologic: Negative for immunocompromised state. Neurological: Negative for dizziness, tremors, syncope, facial asymmetry, speech difficulty, weakness, numbness and headaches. Psychiatric/Behavioral: Negative for confusion and hallucinations. The patient is not nervous/anxious and is not hyperactive. Objective:   /72 (Site: Right Arm, Position: Sitting, Cuff Size: Medium Adult)   Pulse 84   Temp 98.1 °F (36.7 °C) (Oral)   Resp 14   Ht 5' 3\" (1.6 m)   Wt 120 lb (54.4 kg)   BMI 21.26 kg/m²     Physical Exam   Constitutional: She is oriented to person, place, and time. She appears well-developed and well-nourished. HENT:   Head: Normocephalic and atraumatic. Right Ear: Hearing, external ear and ear canal normal. No drainage or tenderness. A middle ear effusion is present. Left Ear: Hearing, tympanic membrane, external ear and ear canal normal. No drainage. Mouth/Throat: Oropharynx is clear and moist. No oropharyngeal exudate. Eyes: Conjunctivae and EOM are normal. Pupils are equal, round, and reactive to light.  Right eye exhibits no discharge. Left eye exhibits no discharge. No scleral icterus. Neck: Normal range of motion. Neck supple. No tracheal deviation present. No thyromegaly present. Cardiovascular: Normal rate, regular rhythm, normal heart sounds and intact distal pulses. Exam reveals no gallop and no friction rub. No murmur heard. Pulmonary/Chest: Effort normal and breath sounds normal. No respiratory distress. She has no wheezes. She has no rales. She exhibits no tenderness. Abdominal: Soft. Bowel sounds are normal. She exhibits no distension and no mass. There is tenderness in the right upper quadrant. There is no rebound and no guarding. Musculoskeletal: She exhibits no edema. Lymphadenopathy:     She has no cervical adenopathy. Neurological: She is alert and oriented to person, place, and time. Coordination normal.   Skin: Skin is warm and dry. No rash noted. She is not diaphoretic. No erythema. Psychiatric: She has a normal mood and affect. Her behavior is normal. Judgment and thought content normal.   Nursing note and vitals reviewed. Assessment:     1. Fall, subsequent encounter, 2/10/18     2. Closed fracture of one rib of left side with routine healing, 7th rib, subsequent encounter     3. Rib pain on left side     4. Shortness of breath     5. Dysfunction of right eustachian tube     6. Right upper quadrant abdominal pain     7. Screen for colon cancer  Ambulatory referral to Gastroenterology       Plan:      No orders of the defined types were placed in this encounter. No orders of the defined types were placed in this encounter. Controlled Substances Monitoring:      No Follow-up on file. Ebonie FARFAN RMA  , am scribing for and in the presence of Soila Light DO.  Electronically signed by :  MARY Cardenas Sheron Kim, DO, personally performed the services described in this documentation, as scribed by MARY Cardenas   in my presence, and it is both accurate and

## 2018-03-07 ENCOUNTER — HOSPITAL ENCOUNTER (OUTPATIENT)
Dept: ULTRASOUND IMAGING | Age: 53
Discharge: HOME | End: 2018-03-09
Payer: COMMERCIAL

## 2018-03-07 DIAGNOSIS — R10.11 RIGHT UPPER QUADRANT ABDOMINAL PAIN: ICD-10-CM

## 2018-03-07 PROCEDURE — 76705 ECHO EXAM OF ABDOMEN: CPT

## 2018-04-17 ENCOUNTER — OFFICE VISIT (OUTPATIENT)
Dept: GASTROENTEROLOGY | Age: 53
End: 2018-04-17
Payer: COMMERCIAL

## 2018-04-17 VITALS
SYSTOLIC BLOOD PRESSURE: 150 MMHG | BODY MASS INDEX: 21.97 KG/M2 | HEART RATE: 72 BPM | WEIGHT: 124 LBS | DIASTOLIC BLOOD PRESSURE: 86 MMHG

## 2018-04-17 DIAGNOSIS — Z80.0 FAMILY HISTORY OF COLON CANCER: ICD-10-CM

## 2018-04-17 DIAGNOSIS — Z86.010 HX OF COLONIC POLYPS: Primary | ICD-10-CM

## 2018-04-17 PROCEDURE — 99214 OFFICE O/P EST MOD 30 MIN: CPT | Performed by: INTERNAL MEDICINE

## 2018-04-18 ENCOUNTER — HOSPITAL ENCOUNTER (OUTPATIENT)
Age: 53
Setting detail: OUTPATIENT SURGERY
Discharge: HOME | End: 2018-04-18
Attending: INTERNAL MEDICINE | Admitting: INTERNAL MEDICINE
Payer: COMMERCIAL

## 2018-04-18 ENCOUNTER — ANESTHESIA (OUTPATIENT)
Dept: ENDOSCOPY | Age: 53
End: 2018-04-18
Payer: COMMERCIAL

## 2018-04-18 ENCOUNTER — ANESTHESIA EVENT (OUTPATIENT)
Dept: ENDOSCOPY | Age: 53
End: 2018-04-18
Payer: COMMERCIAL

## 2018-04-18 VITALS
RESPIRATION RATE: 7 BRPM | DIASTOLIC BLOOD PRESSURE: 76 MMHG | SYSTOLIC BLOOD PRESSURE: 109 MMHG | OXYGEN SATURATION: 100 %

## 2018-04-18 VITALS
WEIGHT: 124 LBS | RESPIRATION RATE: 16 BRPM | HEART RATE: 95 BPM | BODY MASS INDEX: 22.82 KG/M2 | OXYGEN SATURATION: 94 % | DIASTOLIC BLOOD PRESSURE: 94 MMHG | SYSTOLIC BLOOD PRESSURE: 126 MMHG | HEIGHT: 62 IN | TEMPERATURE: 98.3 F

## 2018-04-18 PROCEDURE — 2500000003 HC RX 250 WO HCPCS: Performed by: NURSE ANESTHETIST, CERTIFIED REGISTERED

## 2018-04-18 PROCEDURE — 7100000010 HC PHASE II RECOVERY - FIRST 15 MIN: Performed by: INTERNAL MEDICINE

## 2018-04-18 PROCEDURE — 3700000001 HC ADD 15 MINUTES (ANESTHESIA): Performed by: INTERNAL MEDICINE

## 2018-04-18 PROCEDURE — 6360000002 HC RX W HCPCS: Performed by: NURSE ANESTHETIST, CERTIFIED REGISTERED

## 2018-04-18 PROCEDURE — G0105 COLORECTAL SCRN; HI RISK IND: HCPCS | Performed by: INTERNAL MEDICINE

## 2018-04-18 PROCEDURE — 3609027000 HC COLONOSCOPY: Performed by: INTERNAL MEDICINE

## 2018-04-18 PROCEDURE — 3700000000 HC ANESTHESIA ATTENDED CARE: Performed by: INTERNAL MEDICINE

## 2018-04-18 RX ORDER — NICOTINE POLACRILEX 4 MG/1
20 GUM, CHEWING ORAL 2 TIMES DAILY
Qty: 60 TABLET | Refills: 6 | Status: SHIPPED | OUTPATIENT
Start: 2018-04-18 | End: 2018-05-14 | Stop reason: SDUPTHER

## 2018-04-18 RX ORDER — LIDOCAINE HYDROCHLORIDE 10 MG/ML
1 INJECTION, SOLUTION EPIDURAL; INFILTRATION; INTRACAUDAL; PERINEURAL
Status: CANCELLED | OUTPATIENT
Start: 2018-04-18 | End: 2018-04-18

## 2018-04-18 RX ORDER — LIDOCAINE HYDROCHLORIDE 20 MG/ML
INJECTION, SOLUTION INFILTRATION; PERINEURAL PRN
Status: DISCONTINUED | OUTPATIENT
Start: 2018-04-18 | End: 2018-04-18 | Stop reason: SDUPTHER

## 2018-04-18 RX ORDER — ONDANSETRON 2 MG/ML
4 INJECTION INTRAMUSCULAR; INTRAVENOUS
Status: DISCONTINUED | OUTPATIENT
Start: 2018-04-18 | End: 2018-04-18 | Stop reason: HOSPADM

## 2018-04-18 RX ORDER — SODIUM CHLORIDE 0.9 % (FLUSH) 0.9 %
10 SYRINGE (ML) INJECTION PRN
Status: CANCELLED | OUTPATIENT
Start: 2018-04-18

## 2018-04-18 RX ORDER — GLYCOPYRROLATE 1 MG/5 ML
SYRINGE (ML) INTRAVENOUS PRN
Status: DISCONTINUED | OUTPATIENT
Start: 2018-04-18 | End: 2018-04-18 | Stop reason: SDUPTHER

## 2018-04-18 RX ORDER — SODIUM CHLORIDE 0.9 % (FLUSH) 0.9 %
10 SYRINGE (ML) INJECTION EVERY 12 HOURS SCHEDULED
Status: CANCELLED | OUTPATIENT
Start: 2018-04-18

## 2018-04-18 RX ORDER — PROPOFOL 10 MG/ML
INJECTION, EMULSION INTRAVENOUS CONTINUOUS PRN
Status: DISCONTINUED | OUTPATIENT
Start: 2018-04-18 | End: 2018-04-18 | Stop reason: SDUPTHER

## 2018-04-18 RX ORDER — SODIUM CHLORIDE 9 MG/ML
INJECTION, SOLUTION INTRAVENOUS CONTINUOUS
Status: CANCELLED | OUTPATIENT
Start: 2018-04-18

## 2018-04-18 RX ORDER — PROPOFOL 10 MG/ML
INJECTION, EMULSION INTRAVENOUS PRN
Status: DISCONTINUED | OUTPATIENT
Start: 2018-04-18 | End: 2018-04-18 | Stop reason: SDUPTHER

## 2018-04-18 RX ADMIN — LIDOCAINE HYDROCHLORIDE 60 MG: 20 INJECTION, SOLUTION INFILTRATION; PERINEURAL at 13:44

## 2018-04-18 RX ADMIN — PROPOFOL 30 MG: 10 INJECTION, EMULSION INTRAVENOUS at 13:45

## 2018-04-18 RX ADMIN — PROPOFOL 100 MG: 10 INJECTION, EMULSION INTRAVENOUS at 13:44

## 2018-04-18 RX ADMIN — PROPOFOL 140 MCG/KG/MIN: 10 INJECTION, EMULSION INTRAVENOUS at 13:44

## 2018-04-18 RX ADMIN — Medication 0.4 MG: at 13:46

## 2018-04-18 ASSESSMENT — LIFESTYLE VARIABLES: SMOKING_STATUS: 1

## 2018-04-18 ASSESSMENT — PAIN - FUNCTIONAL ASSESSMENT: PAIN_FUNCTIONAL_ASSESSMENT: 0-10

## 2018-05-03 ENCOUNTER — HOSPITAL ENCOUNTER (OUTPATIENT)
Dept: NUCLEAR MEDICINE | Age: 53
Discharge: HOME | End: 2018-05-05
Payer: COMMERCIAL

## 2018-05-03 DIAGNOSIS — C50.912 MALIGNANT NEOPLASM OF LEFT FEMALE BREAST, UNSPECIFIED ESTROGEN RECEPTOR STATUS, UNSPECIFIED SITE OF BREAST (HCC): ICD-10-CM

## 2018-05-03 PROCEDURE — A9503 TC99M MEDRONATE: HCPCS | Performed by: INTERNAL MEDICINE

## 2018-05-03 PROCEDURE — 3430000000 HC RX DIAGNOSTIC RADIOPHARMACEUTICAL: Performed by: INTERNAL MEDICINE

## 2018-05-03 PROCEDURE — 78306 BONE IMAGING WHOLE BODY: CPT

## 2018-05-03 RX ORDER — TC 99M MEDRONATE 20 MG/10ML
30 INJECTION, POWDER, LYOPHILIZED, FOR SOLUTION INTRAVENOUS
Status: COMPLETED | OUTPATIENT
Start: 2018-05-03 | End: 2018-05-03

## 2018-05-03 RX ADMIN — Medication 32.1 MILLICURIE: at 11:15

## 2018-05-07 ENCOUNTER — HOSPITAL ENCOUNTER (OUTPATIENT)
Dept: ULTRASOUND IMAGING | Age: 53
End: 2018-05-07
Payer: COMMERCIAL

## 2018-05-07 ENCOUNTER — HOSPITAL ENCOUNTER (OUTPATIENT)
Dept: WOMENS IMAGING | Age: 53
Discharge: HOME | End: 2018-05-09
Payer: COMMERCIAL

## 2018-05-07 DIAGNOSIS — Z85.3 HISTORY OF BREAST CANCER: ICD-10-CM

## 2018-05-07 PROCEDURE — 77066 DX MAMMO INCL CAD BI: CPT

## 2018-05-14 ENCOUNTER — OFFICE VISIT (OUTPATIENT)
Dept: OBGYN CLINIC | Age: 53
End: 2018-05-14
Payer: COMMERCIAL

## 2018-05-14 VITALS
WEIGHT: 115.7 LBS | SYSTOLIC BLOOD PRESSURE: 122 MMHG | BODY MASS INDEX: 20.5 KG/M2 | DIASTOLIC BLOOD PRESSURE: 78 MMHG | HEIGHT: 63 IN | HEART RATE: 83 BPM | OXYGEN SATURATION: 96 %

## 2018-05-14 DIAGNOSIS — Z01.419 WELL WOMAN EXAM WITH ROUTINE GYNECOLOGICAL EXAM: Primary | ICD-10-CM

## 2018-05-14 DIAGNOSIS — Z11.51 SCREENING FOR HPV (HUMAN PAPILLOMAVIRUS): ICD-10-CM

## 2018-05-14 PROCEDURE — 99396 PREV VISIT EST AGE 40-64: CPT | Performed by: OBSTETRICS & GYNECOLOGY

## 2018-05-14 RX ORDER — FLUTICASONE PROPIONATE 50 MCG
1 SPRAY, SUSPENSION (ML) NASAL DAILY
COMMUNITY
End: 2022-04-11 | Stop reason: ALTCHOICE

## 2018-05-14 RX ORDER — NICOTINE POLACRILEX 4 MG/1
20 GUM, CHEWING ORAL 2 TIMES DAILY
Qty: 60 TABLET | Refills: 6 | Status: SHIPPED | OUTPATIENT
Start: 2018-05-14 | End: 2019-02-21

## 2018-05-22 RX ORDER — GABAPENTIN 600 MG/1
TABLET ORAL
Qty: 120 TABLET | Refills: 0 | OUTPATIENT
Start: 2018-05-22 | End: 2018-06-21

## 2018-06-08 RX ORDER — PANTOPRAZOLE SODIUM 20 MG/1
20 TABLET, DELAYED RELEASE ORAL DAILY
Qty: 30 TABLET | Refills: 3 | Status: SHIPPED | OUTPATIENT
Start: 2018-06-08 | End: 2018-10-24 | Stop reason: SDUPTHER

## 2018-08-09 ENCOUNTER — HOSPITAL ENCOUNTER (OUTPATIENT)
Dept: NUCLEAR MEDICINE | Age: 53
Discharge: HOME | End: 2018-08-11
Payer: COMMERCIAL

## 2018-08-09 DIAGNOSIS — R07.81 RIB PAIN ON RIGHT SIDE: ICD-10-CM

## 2018-08-09 DIAGNOSIS — C50.912 MALIGNANT NEOPLASM OF LEFT FEMALE BREAST, UNSPECIFIED ESTROGEN RECEPTOR STATUS, UNSPECIFIED SITE OF BREAST (HCC): ICD-10-CM

## 2018-08-09 PROCEDURE — 3430000000 HC RX DIAGNOSTIC RADIOPHARMACEUTICAL: Performed by: INTERNAL MEDICINE

## 2018-08-09 PROCEDURE — A9503 TC99M MEDRONATE: HCPCS | Performed by: INTERNAL MEDICINE

## 2018-08-09 PROCEDURE — 78306 BONE IMAGING WHOLE BODY: CPT

## 2018-08-09 RX ORDER — TC 99M MEDRONATE 20 MG/10ML
30 INJECTION, POWDER, LYOPHILIZED, FOR SOLUTION INTRAVENOUS
Status: COMPLETED | OUTPATIENT
Start: 2018-08-09 | End: 2018-08-09

## 2018-08-09 RX ADMIN — Medication 30.6 MILLICURIE: at 07:08

## 2018-08-16 RX ORDER — HYDROCHLOROTHIAZIDE 25 MG/1
TABLET ORAL
Qty: 45 TABLET | Refills: 0 | Status: SHIPPED | OUTPATIENT
Start: 2018-08-16 | End: 2018-11-20 | Stop reason: SDUPTHER

## 2018-08-27 LAB
ALBUMIN SERPL-MCNC: 3.6 G/DL
ALP BLD-CCNC: 61 U/L
ALT SERPL-CCNC: 8 U/L
ANION GAP SERPL CALCULATED.3IONS-SCNC: ABNORMAL MMOL/L
AST SERPL-CCNC: 14 U/L
BASOPHILS ABSOLUTE: ABNORMAL /ΜL
BASOPHILS RELATIVE PERCENT: ABNORMAL %
BILIRUB SERPL-MCNC: 0.3 MG/DL (ref 0.1–1.4)
BUN BLDV-MCNC: 10 MG/DL
CALCIUM SERPL-MCNC: 8.9 MG/DL
CHLORIDE BLD-SCNC: 108 MMOL/L
CO2: 32 MMOL/L
CREAT SERPL-MCNC: 0.54 MG/DL
EOSINOPHILS ABSOLUTE: ABNORMAL /ΜL
EOSINOPHILS RELATIVE PERCENT: ABNORMAL %
GFR CALCULATED: 109
GLUCOSE BLD-MCNC: 89 MG/DL
HCT VFR BLD CALC: 37.3 % (ref 36–46)
HEMOGLOBIN: 12.7 G/DL (ref 12–16)
LYMPHOCYTES ABSOLUTE: 2.4 /ΜL
LYMPHOCYTES RELATIVE PERCENT: 43.6 %
MCH RBC QN AUTO: 31.3 PG
MCHC RBC AUTO-ENTMCNC: 34.1 G/DL
MCV RBC AUTO: 91.8 FL
MONOCYTES ABSOLUTE: 0.4 /ΜL
MONOCYTES RELATIVE PERCENT: 7.4 %
NEUTROPHILS ABSOLUTE: ABNORMAL /ΜL
NEUTROPHILS RELATIVE PERCENT: ABNORMAL %
PDW BLD-RTO: 17.8 %
PLATELET # BLD: 299 K/ΜL
PMV BLD AUTO: 6 FL
POTASSIUM SERPL-SCNC: 3.9 MMOL/L
RBC # BLD: 4.06 10^6/ΜL
SODIUM BLD-SCNC: 143 MMOL/L
TOTAL PROTEIN: 5.7
WBC # BLD: 5.5 10^3/ML

## 2018-09-04 ENCOUNTER — HOSPITAL ENCOUNTER (OUTPATIENT)
Dept: CT IMAGING | Age: 53
Discharge: HOME | End: 2018-09-06
Payer: COMMERCIAL

## 2018-09-04 DIAGNOSIS — Z85.3 HISTORY OF LEFT BREAST CANCER: ICD-10-CM

## 2018-09-04 PROCEDURE — 71260 CT THORAX DX C+: CPT

## 2018-09-04 PROCEDURE — 70470 CT HEAD/BRAIN W/O & W/DYE: CPT

## 2018-09-04 PROCEDURE — 74177 CT ABD & PELVIS W/CONTRAST: CPT

## 2018-09-04 PROCEDURE — 6360000004 HC RX CONTRAST MEDICATION: Performed by: INTERNAL MEDICINE

## 2018-09-04 PROCEDURE — 2500000003 HC RX 250 WO HCPCS: Performed by: INTERNAL MEDICINE

## 2018-09-04 RX ADMIN — IOPAMIDOL 100 ML: 755 INJECTION, SOLUTION INTRAVENOUS at 16:23

## 2018-09-04 RX ADMIN — BARIUM SULFATE 450 ML: 20 SUSPENSION ORAL at 16:23

## 2018-09-11 ENCOUNTER — INITIAL CONSULT (OUTPATIENT)
Dept: INTERVENTIONAL RADIOLOGY/VASCULAR | Age: 53
End: 2018-09-11
Payer: COMMERCIAL

## 2018-09-11 VITALS
BODY MASS INDEX: 21.79 KG/M2 | HEART RATE: 58 BPM | WEIGHT: 123 LBS | RESPIRATION RATE: 14 BRPM | DIASTOLIC BLOOD PRESSURE: 76 MMHG | OXYGEN SATURATION: 99 % | SYSTOLIC BLOOD PRESSURE: 120 MMHG

## 2018-09-11 DIAGNOSIS — M89.9 BONE LESION: Primary | ICD-10-CM

## 2018-09-11 DIAGNOSIS — D05.92 CARCINOMA IN SITU OF LEFT BREAST, UNSPECIFIED TYPE: ICD-10-CM

## 2018-09-11 PROCEDURE — 99244 OFF/OP CNSLTJ NEW/EST MOD 40: CPT | Performed by: RADIOLOGY

## 2018-09-11 ASSESSMENT — ENCOUNTER SYMPTOMS
HEARTBURN: 0
DOUBLE VISION: 0
SPUTUM PRODUCTION: 1
ABDOMINAL PAIN: 0
BLURRED VISION: 0
BACK PAIN: 1
EYE REDNESS: 1
WHEEZING: 1
SHORTNESS OF BREATH: 1
NAUSEA: 0
CONSTIPATION: 0
COUGH: 1
HEMOPTYSIS: 0
DIARRHEA: 0
PHOTOPHOBIA: 0
GASTROINTESTINAL NEGATIVE: 1
VOMITING: 0

## 2018-09-11 NOTE — PROGRESS NOTES
Smoking status: Current Every Day Smoker     Packs/day: 0.50     Types: Cigarettes    Smokeless tobacco: Never Used    Alcohol use 0.0 oz/week      Comment: once in a while    Drug use: No    Sexual activity: Yes     Other Topics Concern    None     Social History Narrative    None       Allergies   Allergen Reactions    Ultram [Tramadol] Shortness Of Breath     Plus nausea       Current Outpatient Prescriptions on File Prior to Visit   Medication Sig Dispense Refill    hydrochlorothiazide (HYDRODIURIL) 25 MG tablet TAKE 1/2 TABLET BY MOUTH EVERY DAY 45 tablet 0    tamoxifen (NOLVADEX) 10 MG tablet       gabapentin (NEURONTIN) 600 MG tablet TAKE 2 TABLETS BY MOUTH 2  TIMES DAILY (TAKE 1200 MG  IN THE MORNING &amp; 1200 MG IN THE EVENING).  120 tablet 3    pantoprazole (PROTONIX) 20 MG tablet Take 1 tablet by mouth daily 30 tablet 3    fluticasone (FLONASE) 50 MCG/ACT nasal spray 1 spray by Nasal route daily      diclofenac (VOLTAREN) 75 MG EC tablet Take 75 mg by mouth 2 times daily      lisinopril (PRINIVIL;ZESTRIL) 20 MG tablet Take 0.5 tablets by mouth daily 90 tablet 3    montelukast (SINGULAIR) 10 MG tablet TAKE 1 TABLET BY MOUTH EVERY NIGHT 90 tablet 3    albuterol sulfate HFA (PROAIR HFA) 108 (90 Base) MCG/ACT inhaler Inhale 2 puffs into the lungs every 6 hours as needed for Wheezing 1 Inhaler 3    cyclobenzaprine (FLEXERIL) 10 MG tablet Take 1 tablet by mouth 3 times daily as needed for Muscle spasms 270 tablet 0    busPIRone (BUSPAR) 10 MG tablet Take 10 mg by mouth 2 times daily      venlafaxine (EFFEXOR XR) 75 MG extended release capsule Take 225 mg by mouth daily       tamoxifen (NOLVADEX) 20 MG tablet Take 20 mg by mouth daily      venlafaxine (EFFEXOR XR) 150 MG extended release capsule       meloxicam (MOBIC) 7.5 MG tablet Take 1 tablet by mouth daily 30 tablet 3    omeprazole 20 MG EC tablet Take 1 tablet by mouth 2 times daily 60 tablet 6    traZODone (DESYREL) 150 MG tablet

## 2018-09-12 ENCOUNTER — SURG/PROC ORDERS (OUTPATIENT)
Dept: GENERAL RADIOLOGY | Age: 53
End: 2018-09-12

## 2018-09-12 DIAGNOSIS — M89.8X9 BONE MASS: Primary | ICD-10-CM

## 2018-09-12 RX ORDER — 0.9 % SODIUM CHLORIDE 0.9 %
250 INTRAVENOUS SOLUTION INTRAVENOUS
Status: CANCELLED | OUTPATIENT
Start: 2018-09-12 | End: 2019-09-12

## 2018-09-12 RX ORDER — IBUPROFEN 200 MG
TABLET ORAL ONCE
Status: CANCELLED | OUTPATIENT
Start: 2018-09-12 | End: 2018-09-12

## 2018-09-12 RX ORDER — MIDAZOLAM HYDROCHLORIDE 1 MG/ML
2 INJECTION INTRAMUSCULAR; INTRAVENOUS
Status: CANCELLED | OUTPATIENT
Start: 2018-09-12 | End: 2019-09-12

## 2018-09-12 RX ORDER — LIDOCAINE HYDROCHLORIDE 20 MG/ML
5 INJECTION, SOLUTION INFILTRATION; PERINEURAL
Status: CANCELLED | OUTPATIENT
Start: 2018-09-12 | End: 2019-09-12

## 2018-09-12 RX ORDER — FENTANYL CITRATE 50 UG/ML
50 INJECTION, SOLUTION INTRAMUSCULAR; INTRAVENOUS
Status: CANCELLED | OUTPATIENT
Start: 2018-09-12 | End: 2019-09-12

## 2018-09-14 ENCOUNTER — HOSPITAL ENCOUNTER (OUTPATIENT)
Dept: ULTRASOUND IMAGING | Age: 53
Discharge: HOME | End: 2018-09-16
Payer: COMMERCIAL

## 2018-09-14 ENCOUNTER — HOSPITAL ENCOUNTER (OUTPATIENT)
Dept: CT IMAGING | Age: 53
Discharge: HOME | End: 2018-09-16
Payer: COMMERCIAL

## 2018-09-14 VITALS
SYSTOLIC BLOOD PRESSURE: 126 MMHG | OXYGEN SATURATION: 94 % | RESPIRATION RATE: 21 BRPM | HEART RATE: 59 BPM | DIASTOLIC BLOOD PRESSURE: 70 MMHG

## 2018-09-14 DIAGNOSIS — M89.9 BONE LESION: ICD-10-CM

## 2018-09-14 DIAGNOSIS — Z86.000 HISTORY OF CARCINOMA IN SITU OF BREAST: ICD-10-CM

## 2018-09-14 DIAGNOSIS — N83.202 CYST OF LEFT OVARY: ICD-10-CM

## 2018-09-14 DIAGNOSIS — Z90.710 S/P ABDOMINAL HYSTERECTOMY AND RIGHT SALPINGO-OOPHORECTOMY: ICD-10-CM

## 2018-09-14 DIAGNOSIS — Z90.721 S/P ABDOMINAL HYSTERECTOMY AND RIGHT SALPINGO-OOPHORECTOMY: ICD-10-CM

## 2018-09-14 DIAGNOSIS — D05.92 CARCINOMA IN SITU OF LEFT BREAST, UNSPECIFIED TYPE: ICD-10-CM

## 2018-09-14 DIAGNOSIS — Z90.79 S/P ABDOMINAL HYSTERECTOMY AND RIGHT SALPINGO-OOPHORECTOMY: ICD-10-CM

## 2018-09-14 PROCEDURE — 6360000002 HC RX W HCPCS: Performed by: NURSE PRACTITIONER

## 2018-09-14 PROCEDURE — 2580000003 HC RX 258: Performed by: NURSE PRACTITIONER

## 2018-09-14 PROCEDURE — 88305 TISSUE EXAM BY PATHOLOGIST: CPT

## 2018-09-14 PROCEDURE — 20220 BONE BIOPSY TROCAR/NDL SUPFC: CPT | Performed by: RADIOLOGY

## 2018-09-14 PROCEDURE — 76830 TRANSVAGINAL US NON-OB: CPT

## 2018-09-14 PROCEDURE — 76856 US EXAM PELVIC COMPLETE: CPT

## 2018-09-14 PROCEDURE — 20220 BONE BIOPSY TROCAR/NDL SUPFC: CPT

## 2018-09-14 PROCEDURE — 88173 CYTOPATH EVAL FNA REPORT: CPT

## 2018-09-14 PROCEDURE — 77012 CT SCAN FOR NEEDLE BIOPSY: CPT | Performed by: RADIOLOGY

## 2018-09-14 PROCEDURE — 99214 OFFICE O/P EST MOD 30 MIN: CPT | Performed by: RADIOLOGY

## 2018-09-14 PROCEDURE — 77012 CT SCAN FOR NEEDLE BIOPSY: CPT

## 2018-09-14 RX ORDER — BACITRACIN, NEOMYCIN, POLYMYXIN B 400; 3.5; 5 [USP'U]/G; MG/G; [USP'U]/G
OINTMENT TOPICAL ONCE
Status: DISCONTINUED | OUTPATIENT
Start: 2018-09-14 | End: 2018-09-17 | Stop reason: HOSPADM

## 2018-09-14 RX ORDER — MIDAZOLAM HYDROCHLORIDE 1 MG/ML
2 INJECTION INTRAMUSCULAR; INTRAVENOUS
Status: DISCONTINUED | OUTPATIENT
Start: 2018-09-14 | End: 2018-09-17 | Stop reason: HOSPADM

## 2018-09-14 RX ORDER — FENTANYL CITRATE 50 UG/ML
50 INJECTION, SOLUTION INTRAMUSCULAR; INTRAVENOUS
Status: DISCONTINUED | OUTPATIENT
Start: 2018-09-14 | End: 2018-09-17 | Stop reason: HOSPADM

## 2018-09-14 RX ORDER — 0.9 % SODIUM CHLORIDE 0.9 %
250 INTRAVENOUS SOLUTION INTRAVENOUS
Status: DISCONTINUED | OUTPATIENT
Start: 2018-09-14 | End: 2018-09-17 | Stop reason: HOSPADM

## 2018-09-14 RX ORDER — LIDOCAINE HYDROCHLORIDE 20 MG/ML
5 INJECTION, SOLUTION INFILTRATION; PERINEURAL
Status: DISCONTINUED | OUTPATIENT
Start: 2018-09-14 | End: 2018-09-17 | Stop reason: HOSPADM

## 2018-09-14 RX ADMIN — SODIUM CHLORIDE 250 ML: 9 INJECTION, SOLUTION INTRAVENOUS at 12:03

## 2018-09-14 RX ADMIN — MIDAZOLAM HYDROCHLORIDE 0.5 MG: 1 INJECTION, SOLUTION INTRAMUSCULAR; INTRAVENOUS at 12:13

## 2018-09-14 RX ADMIN — FENTANYL CITRATE 50 MCG: 50 INJECTION, SOLUTION INTRAMUSCULAR; INTRAVENOUS at 12:13

## 2018-09-14 ASSESSMENT — PAIN SCALES - GENERAL: PAINLEVEL_OUTOF10: 0

## 2018-09-14 NOTE — SEDATION DOCUMENTATION
Patient supine on CT table. Scans done, Dr Iva Fleming marked, prepped and draped area of right lateral rib. LIdocaine given to numb. Nick Ingram placing introducer needle. Chiba 18g x 10cm using CT fluoro guidance. VSS. Pt tolerating well. 1225 Total 1 FNA taken and placed in Cytology jar and onto slide which was placed into separate cytology jar. 1228 Procedure complete. Bandaid applied. Pt tolerated well.   Ok to D/C to home at 1:14pm.

## 2018-09-14 NOTE — PROGRESS NOTES
Pt to CT HR via ambulation. Assisted into gown and onto cart. History, medications, allergies reviewed. Placed on monitor. Procedure explained. Questions answered. Consent signed. IV inserted. Dr Jhonny Arreola here speaking with pt. Support given. 1150 pt taken to CT for procedure.

## 2018-09-14 NOTE — PROGRESS NOTES
Pt to CT HR, via cart. Denies pain. Vitals stable. No bleeding noted at right rib biopsy site. 1250 d/c instructions given to pt. Verbalize understanding. 1300 pt dressed. Insist on walking to lobby for d/c home with . 1310 gait steady ,no complaints voiced. No changes noted.

## 2018-09-19 ASSESSMENT — ENCOUNTER SYMPTOMS
DOUBLE VISION: 0
NAUSEA: 0
HEARTBURN: 0
VOMITING: 0
RESPIRATORY NEGATIVE: 1
COUGH: 0
WHEEZING: 0
ABDOMINAL PAIN: 0
EYES NEGATIVE: 1
HEMOPTYSIS: 0
GASTROINTESTINAL NEGATIVE: 1
PHOTOPHOBIA: 0
CONSTIPATION: 0
BLURRED VISION: 0
DIARRHEA: 0
SPUTUM PRODUCTION: 0
SHORTNESS OF BREATH: 0
BACK PAIN: 0

## 2018-09-19 NOTE — H&P
Miranda Mahmood, a female of 47 y.o. came to the office 9/14/2018. No chief complaint on file. HPI: Patient is a pleasant 52-year-old woman who presents today for hypermetabolic activity seen on both ribs at the site of previous fractures. Though this activity is increasing rather than decreasing over time, and concern was for possible metastatic disease. She is here today for biopsy of the largest metabolic area in the site of a fracture at the right lateral sixth rib. She has no complaints today aside from the mild pain she has at the site of the rib fractures.        Family History   Problem Relation Age of Onset    Cancer Mother         colon    High Blood Pressure Mother     Heart Disease Mother     Colon Cancer Mother     Cancer Father         colon    High Blood Pressure Father     Colon Cancer Father        Past Surgical History:   Procedure Laterality Date    BONE BIOPSY Right 09/14/2018    CT Guided Right Rib Fine Needle Asp[facility] by DR Almendarez [facility]    BREAST SURGERY      breast bx/ lumpectomy    COLONOSCOPY      DILATION AND CURETTAGE OF UTERUS      ENDOSCOPY, COLON, DIAGNOSTIC      HYSTERECTOMY  2012    LAPAROSCOPY      LUMBAR DISCECTOMY      L4-L5    CA COLORECTAL SCRN; HI RISK IND N/A 4/18/2018    [facility] ESOPH[facility]ODENOSCOPY TRANSORAL DIAGNOSTIC N/A 5/11/2017    EGD ESOPHAGOGASTRODUODENOSCOPY performed by Mino Mejia MD at [address] [place] [address]. [address]      UPPER GASTROINTESTINAL ENDOSCOPY          Past Medical History:   Diagnosis Date    Anxiety     Bronchitis     Cancer ([facility] [address]     left breast    Chronic pain syndrome 10/6/2016    Colon polyps     Depression     suicide attempts    Duodenitis     Esophageal erosions     Family hx of colon cancer, Mother & Father 4/18/2017    Fibromyalgia     Foot deformity 10/6/2016    GERD (gastroesophageal reflux disease)     gastritis    Hypertension     Lumbar radiculopathy 5/10/2017    Osteoarthritis     TMJ arthritis 10/6/2016       Social History     Social History    Marital status:      Spouse name: N/A    Number of children: N/A    Years of education: N/A     Social History Main Topics    Smoking status: Current Every Day Smoker     Packs/day: 0.50     Types: Cigarettes    Smokeless tobacco: Never Used    Alcohol use 0.0 oz/week      Comment: once in a while    Drug use: No    Sexual activity: Yes     Other Topics Concern    None     Social History Narrative    None       Allergies   Allergen Reactions    Ultram [Tramadol] Shortness Of Breath     Plus nausea       Current Outpatient Prescriptions on File Prior to Encounter   Medication Sig Dispense Refill    hydrochlorothiazide (HYDRODIURIL) 25 MG tablet TAKE 1/2 TABLET BY MOUTH EVERY DAY 45 tablet 0    tamoxifen (NOLVADEX) 10 MG tablet       venlafaxine (EFFEXOR XR) 150 MG extended release capsule       gabapentin (NEURONTIN) 600 MG tablet TAKE 2 TABLETS BY MOUTH 2  TIMES DAILY (TAKE 1200 MG  IN THE MORNING &amp; 1200 MG IN THE EVENING). 120 tablet 3    pantoprazole (PROTONIX) 20 MG tablet Take 1 tablet by mouth daily 30 tablet 3    diclofenac (VOLTAREN) 75 MG EC tablet Take 75 mg by mouth 2 times daily      lisinopril (PRINIVIL;ZESTRIL) 20 MG tablet Take 0.5 tablets by mouth daily 90 tablet 3    montelukast (SINGULAIR) 10 MG tablet TAKE 1 TABLET BY MOUTH EVERY NIGHT 90 tablet 3    busPIRone (BUSPAR) 10 MG tablet Take 10 mg by mouth 2 times daily      HYDROmorphone (DILAUDID) 2 MG tablet Take 1 tablet by mouth 3 times daily as needed for Pain for up to 30 days. . 90 tablet 0    meloxicam (MOBIC) 7.5 MG tablet Take 1 tablet by mouth daily 30 tablet 3    fluticasone (FLONASE) 50 MCG/ACT nasal spray 1 spray by Nasal route daily      omeprazole 20 MG EC tablet Take 1 tablet by mouth 2 times daily 60 tablet 6    Mouth/Throat: Oropharynx is clear and moist. No oropharyngeal exudate. Eyes: Pupils are equal, round, and reactive to light. Conjunctivae are normal. Right eye exhibits no discharge. Left eye exhibits no discharge. No scleral icterus. Neck: No JVD present. No tracheal deviation present. No thyromegaly present. Cardiovascular: Normal rate, regular rhythm, normal heart sounds and intact distal pulses. Exam reveals no gallop and no friction rub. No murmur heard. Pulmonary/Chest: Effort normal and breath sounds normal. No stridor. No respiratory distress. She has no wheezes. She has no rales. She exhibits no tenderness. Abdominal: Soft. Bowel sounds are normal. She exhibits no distension and no mass. There is no tenderness. There is no rebound and no guarding. Musculoskeletal: She exhibits tenderness. She exhibits no edema or deformity. Neurological: She is alert and oriented to person, place, and time. No cranial nerve deficit. Coordination normal.   Skin: Skin is warm and dry. No rash noted. She is not diaphoretic. No erythema. No pallor. Psychiatric: She has a normal mood and affect. Her behavior is normal. Judgment and thought content normal.       ASSESSMENT AND PLAN:    ASSESSMENT: Hypermetabolic area on bone scan which has increased over time in the ribs.     PLAN: CT-guided right lateral sixth rib biopsy    Jasmin Thompson MD

## 2018-09-20 ENCOUNTER — OFFICE VISIT (OUTPATIENT)
Dept: OBGYN CLINIC | Age: 53
End: 2018-09-20
Payer: COMMERCIAL

## 2018-09-20 VITALS
HEIGHT: 63 IN | DIASTOLIC BLOOD PRESSURE: 74 MMHG | WEIGHT: 121 LBS | SYSTOLIC BLOOD PRESSURE: 98 MMHG | BODY MASS INDEX: 21.44 KG/M2

## 2018-09-20 DIAGNOSIS — N83.202 CYST OF LEFT OVARY: ICD-10-CM

## 2018-09-20 DIAGNOSIS — N83.202 CYST OF LEFT OVARY: Primary | ICD-10-CM

## 2018-09-20 LAB — CA 125: 6.2 U/ML (ref 0–35)

## 2018-09-20 PROCEDURE — 99213 OFFICE O/P EST LOW 20 MIN: CPT | Performed by: OBSTETRICS & GYNECOLOGY

## 2018-09-24 ENCOUNTER — TELEPHONE (OUTPATIENT)
Dept: OBGYN CLINIC | Age: 53
End: 2018-09-24

## 2018-09-24 NOTE — TELEPHONE ENCOUNTER
----- Message from Rayray Vazquez MD sent at 9/22/2018  7:37 PM EDT -----  May advise pt that cancer marker is normal and pt should have repeat US in 2-3mos as discussed

## 2018-10-03 ENCOUNTER — TELEPHONE (OUTPATIENT)
Dept: GENERAL RADIOLOGY | Age: 53
End: 2018-10-03

## 2018-10-03 ENCOUNTER — TELEPHONE (OUTPATIENT)
Dept: INTERVENTIONAL RADIOLOGY/VASCULAR | Age: 53
End: 2018-10-03

## 2018-10-03 NOTE — TELEPHONE ENCOUNTER
I called Histology regarding pathology results. Marianna People states she only received specimens in cyto rich red for cytology.  Please call [employer] with any questions [phone]

## 2018-10-03 NOTE — TELEPHONE ENCOUNTER
LMOM to call office back regarding Rib bone biopsy.   Electronically signed by VERÓNICA Diego CNP on 10/3/18 at 1:50 PM

## 2018-10-24 RX ORDER — PANTOPRAZOLE SODIUM 20 MG/1
TABLET, DELAYED RELEASE ORAL
Qty: 30 TABLET | Refills: 3 | Status: SHIPPED | OUTPATIENT
Start: 2018-10-24 | End: 2019-02-21 | Stop reason: SDUPTHER

## 2018-11-20 RX ORDER — HYDROCHLOROTHIAZIDE 25 MG/1
TABLET ORAL
Qty: 45 TABLET | Refills: 0 | Status: CANCELLED | OUTPATIENT
Start: 2018-11-20

## 2018-11-20 RX ORDER — HYDROCHLOROTHIAZIDE 25 MG/1
TABLET ORAL
Qty: 45 TABLET | Refills: 3 | Status: SHIPPED | OUTPATIENT
Start: 2018-11-20 | End: 2019-02-21 | Stop reason: SDUPTHER

## 2019-02-04 DIAGNOSIS — I10 ESSENTIAL HYPERTENSION: ICD-10-CM

## 2019-02-04 DIAGNOSIS — M96.1 POSTLAMINECTOMY SYNDROME, LUMBAR: ICD-10-CM

## 2019-02-04 DIAGNOSIS — M47.817 LUMBOSACRAL SPONDYLOSIS WITHOUT MYELOPATHY: ICD-10-CM

## 2019-02-04 RX ORDER — GABAPENTIN 600 MG/1
TABLET ORAL
Qty: 120 TABLET | Refills: 2 | OUTPATIENT
Start: 2019-02-04 | End: 2019-05-04

## 2019-02-04 RX ORDER — DICLOFENAC SODIUM 75 MG/1
75 TABLET, DELAYED RELEASE ORAL 2 TIMES DAILY
Qty: 60 TABLET | Refills: 0 | OUTPATIENT
Start: 2019-02-04

## 2019-02-04 RX ORDER — LISINOPRIL 20 MG/1
10 TABLET ORAL DAILY
Qty: 90 TABLET | Refills: 3 | OUTPATIENT
Start: 2019-02-04

## 2019-02-04 RX ORDER — PANTOPRAZOLE SODIUM 20 MG/1
TABLET, DELAYED RELEASE ORAL
Qty: 30 TABLET | Refills: 3 | OUTPATIENT
Start: 2019-02-04

## 2019-02-04 RX ORDER — HYDROCHLOROTHIAZIDE 25 MG/1
TABLET ORAL
Qty: 45 TABLET | Refills: 3 | OUTPATIENT
Start: 2019-02-04

## 2019-02-04 RX ORDER — MONTELUKAST SODIUM 10 MG/1
TABLET ORAL
Qty: 90 TABLET | Refills: 3 | OUTPATIENT
Start: 2019-02-04

## 2019-02-04 RX ORDER — MELOXICAM 15 MG/1
15 TABLET ORAL DAILY
Qty: 90 TABLET | Refills: 0 | OUTPATIENT
Start: 2019-02-04 | End: 2019-05-05

## 2019-02-21 ENCOUNTER — OFFICE VISIT (OUTPATIENT)
Dept: PRIMARY CARE CLINIC | Age: 54
End: 2019-02-21
Payer: COMMERCIAL

## 2019-02-21 VITALS
HEART RATE: 81 BPM | DIASTOLIC BLOOD PRESSURE: 86 MMHG | RESPIRATION RATE: 16 BRPM | TEMPERATURE: 98.3 F | HEIGHT: 63 IN | WEIGHT: 136 LBS | SYSTOLIC BLOOD PRESSURE: 128 MMHG | BODY MASS INDEX: 24.1 KG/M2 | OXYGEN SATURATION: 98 %

## 2019-02-21 DIAGNOSIS — F32.A DEPRESSION, UNSPECIFIED DEPRESSION TYPE: ICD-10-CM

## 2019-02-21 DIAGNOSIS — I10 ESSENTIAL HYPERTENSION: Primary | ICD-10-CM

## 2019-02-21 DIAGNOSIS — F41.9 ANXIETY: ICD-10-CM

## 2019-02-21 DIAGNOSIS — M96.1 POSTLAMINECTOMY SYNDROME, LUMBAR: ICD-10-CM

## 2019-02-21 DIAGNOSIS — G89.4 CHRONIC PAIN SYNDROME: ICD-10-CM

## 2019-02-21 DIAGNOSIS — M47.817 LUMBOSACRAL SPONDYLOSIS WITHOUT MYELOPATHY: ICD-10-CM

## 2019-02-21 PROCEDURE — 99214 OFFICE O/P EST MOD 30 MIN: CPT | Performed by: FAMILY MEDICINE

## 2019-02-21 RX ORDER — LISINOPRIL 20 MG/1
10 TABLET ORAL DAILY
Qty: 90 TABLET | Refills: 3 | Status: SHIPPED | OUTPATIENT
Start: 2019-02-21 | End: 2019-08-06

## 2019-02-21 RX ORDER — ALBUTEROL SULFATE 90 UG/1
2 AEROSOL, METERED RESPIRATORY (INHALATION) EVERY 6 HOURS PRN
Qty: 1 INHALER | Refills: 3 | Status: SHIPPED | OUTPATIENT
Start: 2019-02-21 | End: 2021-05-11 | Stop reason: SDUPTHER

## 2019-02-21 RX ORDER — MONTELUKAST SODIUM 10 MG/1
TABLET ORAL
Qty: 90 TABLET | Refills: 3 | Status: SHIPPED | OUTPATIENT
Start: 2019-02-21 | End: 2020-04-14 | Stop reason: ALTCHOICE

## 2019-02-21 RX ORDER — PANTOPRAZOLE SODIUM 20 MG/1
TABLET, DELAYED RELEASE ORAL
Qty: 90 TABLET | Refills: 1 | Status: SHIPPED | OUTPATIENT
Start: 2019-02-21 | End: 2019-06-20 | Stop reason: DRUGHIGH

## 2019-02-21 RX ORDER — VARENICLINE TARTRATE 25 MG
KIT ORAL
Qty: 1 EACH | Refills: 0 | Status: SHIPPED | OUTPATIENT
Start: 2019-02-21 | End: 2019-05-14 | Stop reason: ALTCHOICE

## 2019-02-21 RX ORDER — GABAPENTIN 600 MG/1
TABLET ORAL
Qty: 120 TABLET | Refills: 2 | Status: SHIPPED | OUTPATIENT
Start: 2019-02-21 | End: 2019-05-14 | Stop reason: SDUPTHER

## 2019-02-21 RX ORDER — DICLOFENAC SODIUM 75 MG/1
75 TABLET, DELAYED RELEASE ORAL 2 TIMES DAILY
Qty: 180 TABLET | Refills: 1 | Status: SHIPPED | OUTPATIENT
Start: 2019-02-21 | End: 2019-02-22 | Stop reason: SDUPTHER

## 2019-02-21 RX ORDER — HYDROCHLOROTHIAZIDE 25 MG/1
TABLET ORAL
Qty: 90 TABLET | Refills: 1 | Status: SHIPPED | OUTPATIENT
Start: 2019-02-21 | End: 2019-08-06

## 2019-02-21 RX ORDER — VARENICLINE TARTRATE 1 MG/1
1 TABLET, FILM COATED ORAL 2 TIMES DAILY
Qty: 60 TABLET | Refills: 2 | Status: SHIPPED | OUTPATIENT
Start: 2019-02-21 | End: 2019-05-14 | Stop reason: ALTCHOICE

## 2019-02-21 ASSESSMENT — ENCOUNTER SYMPTOMS
EYE REDNESS: 0
ORTHOPNEA: 0
APNEA: 0
EYE DISCHARGE: 0
BLURRED VISION: 0
WHEEZING: 0
CHEST TIGHTNESS: 0
NAUSEA: 0
COLOR CHANGE: 0
SHORTNESS OF BREATH: 1
DIARRHEA: 0
FACIAL SWELLING: 0
CHOKING: 0
STRIDOR: 0
ABDOMINAL PAIN: 0
EYE PAIN: 0
CONSTIPATION: 0
PHOTOPHOBIA: 0

## 2019-02-22 PROBLEM — M47.812 CERVICAL SPONDYLOSIS WITHOUT MYELOPATHY: Status: ACTIVE | Noted: 2019-02-22

## 2019-05-06 ENCOUNTER — TELEPHONE (OUTPATIENT)
Dept: PRIMARY CARE CLINIC | Age: 54
End: 2019-05-06

## 2019-05-14 ENCOUNTER — OFFICE VISIT (OUTPATIENT)
Dept: FAMILY MEDICINE CLINIC | Age: 54
End: 2019-05-14
Payer: COMMERCIAL

## 2019-05-14 VITALS
TEMPERATURE: 97.4 F | SYSTOLIC BLOOD PRESSURE: 126 MMHG | HEIGHT: 63 IN | HEART RATE: 75 BPM | WEIGHT: 145 LBS | BODY MASS INDEX: 25.69 KG/M2 | OXYGEN SATURATION: 97 % | DIASTOLIC BLOOD PRESSURE: 72 MMHG

## 2019-05-14 DIAGNOSIS — M47.817 LUMBOSACRAL SPONDYLOSIS WITHOUT MYELOPATHY: Primary | ICD-10-CM

## 2019-05-14 DIAGNOSIS — M25.531 RIGHT WRIST PAIN: ICD-10-CM

## 2019-05-14 DIAGNOSIS — M96.1 POSTLAMINECTOMY SYNDROME, LUMBAR: ICD-10-CM

## 2019-05-14 DIAGNOSIS — Z71.6 ENCOUNTER FOR SMOKING CESSATION COUNSELING: ICD-10-CM

## 2019-05-14 DIAGNOSIS — R06.01 ORTHOPNEA: ICD-10-CM

## 2019-05-14 DIAGNOSIS — M77.9 TENDONITIS: ICD-10-CM

## 2019-05-14 LAB
ALBUMIN SERPL-MCNC: 4.2 G/DL (ref 3.5–4.6)
ALP BLD-CCNC: 78 U/L (ref 40–130)
ALT SERPL-CCNC: 20 U/L (ref 0–33)
ANION GAP SERPL CALCULATED.3IONS-SCNC: 16 MEQ/L (ref 9–15)
AST SERPL-CCNC: 23 U/L (ref 0–35)
BASOPHILS ABSOLUTE: 0 K/UL (ref 0–0.2)
BASOPHILS RELATIVE PERCENT: 0.4 %
BILIRUB SERPL-MCNC: 0.3 MG/DL (ref 0.2–0.7)
BUN BLDV-MCNC: 15 MG/DL (ref 6–20)
CALCIUM SERPL-MCNC: 9.4 MG/DL (ref 8.5–9.9)
CHLORIDE BLD-SCNC: 99 MEQ/L (ref 95–107)
CHOLESTEROL, TOTAL: 313 MG/DL (ref 0–199)
CO2: 25 MEQ/L (ref 20–31)
CREAT SERPL-MCNC: 0.5 MG/DL (ref 0.5–0.9)
EOSINOPHILS ABSOLUTE: 0.1 K/UL (ref 0–0.7)
EOSINOPHILS RELATIVE PERCENT: 1.7 %
GFR AFRICAN AMERICAN: >60
GFR NON-AFRICAN AMERICAN: >60
GLOBULIN: 2.5 G/DL (ref 2.3–3.5)
GLUCOSE BLD-MCNC: 87 MG/DL (ref 70–99)
HCT VFR BLD CALC: 43.3 % (ref 37–47)
HDLC SERPL-MCNC: 81 MG/DL (ref 40–59)
HEMOGLOBIN: 14.1 G/DL (ref 12–16)
LDL CHOLESTEROL CALCULATED: 212 MG/DL (ref 0–129)
LYMPHOCYTES ABSOLUTE: 2 K/UL (ref 1–4.8)
LYMPHOCYTES RELATIVE PERCENT: 38.2 %
MCH RBC QN AUTO: 29.8 PG (ref 27–31.3)
MCHC RBC AUTO-ENTMCNC: 32.5 % (ref 33–37)
MCV RBC AUTO: 91.6 FL (ref 82–100)
MONOCYTES ABSOLUTE: 0.4 K/UL (ref 0.2–0.8)
MONOCYTES RELATIVE PERCENT: 7.6 %
NEUTROPHILS ABSOLUTE: 2.7 K/UL (ref 1.4–6.5)
NEUTROPHILS RELATIVE PERCENT: 52.1 %
PDW BLD-RTO: 15.2 % (ref 11.5–14.5)
PLATELET # BLD: 280 K/UL (ref 130–400)
POTASSIUM SERPL-SCNC: 4 MEQ/L (ref 3.4–4.9)
PRO-BNP: 15 PG/ML
RBC # BLD: 4.72 M/UL (ref 4.2–5.4)
SODIUM BLD-SCNC: 140 MEQ/L (ref 135–144)
TOTAL PROTEIN: 6.7 G/DL (ref 6.3–8)
TRIGL SERPL-MCNC: 98 MG/DL (ref 0–150)
WBC # BLD: 5.1 K/UL (ref 4.8–10.8)

## 2019-05-14 PROCEDURE — 99214 OFFICE O/P EST MOD 30 MIN: CPT | Performed by: NURSE PRACTITIONER

## 2019-05-14 RX ORDER — GABAPENTIN 600 MG/1
TABLET ORAL
Qty: 360 TABLET | Refills: 0 | Status: SHIPPED | OUTPATIENT
Start: 2019-05-14 | End: 2019-08-23 | Stop reason: SDUPTHER

## 2019-05-14 RX ORDER — DICLOFENAC SODIUM 75 MG/1
TABLET, DELAYED RELEASE ORAL
COMMUNITY
Start: 2019-05-02 | End: 2019-06-20 | Stop reason: SINTOL

## 2019-05-14 RX ORDER — VARENICLINE TARTRATE 25 MG
KIT ORAL
Qty: 1 BOX | Refills: 0 | Status: SHIPPED | OUTPATIENT
Start: 2019-05-14 | End: 2019-08-06

## 2019-05-14 ASSESSMENT — ENCOUNTER SYMPTOMS
ABDOMINAL PAIN: 0
COUGH: 0
BOWEL INCONTINENCE: 0
BACK PAIN: 1
WHEEZING: 0
SHORTNESS OF BREATH: 0

## 2019-05-14 NOTE — PATIENT INSTRUCTIONS
keep a list of the medicines you take. Where can you learn more? Go to [email or web address] org and sign in to your BeFunk[employer] account. Enter C547 in the [place] box to learn more about \"De Quervain's Disease: Exercises. \"     If you do not have an account, please click on the \"Sign Up Now\" link. Current as of: September 20, 2018  Content Version: 12.0  © 9883-3972 Healthwise, Incorporated. Care instructions adapted under license by [facility] ([facility]). If you have questions about a medical condition or this instruction, always ask your healthcare professional. [place] [address] any warranty or liability for your use of this information. Patient Education        Wrist Tendinitis: Exercises  Your Care Instructions  Here are some examples of typical rehabilitation exercises for your condition. Start each exercise slowly. Ease off the exercise if you start to have pain. Your doctor or your physical or occupational therapist will tell you when you can start these exercises and which ones will work best for you. How to do the exercises  Wrist flexion and extension    5. Place your forearm on a table, with your hand and affected wrist extended beyond the table, palm down. 6. Bend your wrist to move your hand upward and allow your hand to close into a fist, then lower your hand and allow your fingers to relax. Hold each position for about 6 seconds. 7. Repeat 8 to 12 times. Hand flips    5. While seated, place your forearm and affected wrist on your thigh, palm down. 6. Flip your hand over so the back of your hand rests on your thigh and your palm is up. Alternate between palm up and palm down while keeping your forearm on your thigh. 7. Repeat 8 to 12 times. Wrist radial and ulnar deviation    6. Hold your affected hand out in front of you, palm down. 7. Slowly bend your wrist as far as you can from side to side.  Hold each position for about 6 professional. [address] any warranty or liability for your use of this information. Patient Education        Stopping Smoking: Care Instructions  Your Care Instructions  Cigarette smokers crave the nicotine in cigarettes. Giving it up is much harder than simply changing a habit. Your body has to stop craving the nicotine. It is hard to quit, but you can do it. There are many tools that people use to quit smoking. You may find that combining tools works best for you. There are several steps to quitting. First you get ready to quit. Then you get support to help you. After that, you learn new skills and behaviors to become a nonsmoker. For many people, a necessary step is getting and using medicine. Your doctor will help you set up the plan that best meets your needs. You may want to attend a smoking cessation program to help you quit smoking. When you choose a program, look for one that has proven success. Ask your doctor for ideas. You will greatly increase your chances of success if you take medicine as well as get counseling or join a cessation program.  Some of the changes you feel when you first quit tobacco are uncomfortable. Your body will miss the nicotine at first, and you may feel short-tempered and grumpy. You may have trouble sleeping or concentrating. Medicine can help you deal with these symptoms. You may struggle with changing your smoking habits and rituals. The last step is the tricky one: Be prepared for the smoking urge to continue for a time. This is a lot to deal with, but keep at it. You will feel better. Follow-up care is a key part of your treatment and safety. Be sure to make and go to all appointments, and call your doctor if you are having problems. It's also a good idea to know your test results and keep a list of the medicines you take. How can you care for yourself at home? · Ask your family, friends, and coworkers for support.  You have a better a healthy diet and get regular exercise. Having healthy habits will help your body move past its craving for nicotine. · Be prepared to keep trying. Most people are not successful the first few times they try to quit. Do not get mad at yourself if you smoke again. Make a list of things you learned and think about when you want to try again, such as next week, next month, or next year. Where can you learn more? Go to https://Vur[employer]chellLimei Advertisingeb.[employer]teo. org and sign in to your [employer] account. Enter Y398 in the PulsePoint box to learn more about \"Stopping Smoking: Care Instructions. \"     If you do not have an account, please click on the \"Sign Up Now\" link. Current as of: September 26, 2018  Content Version: 12.0  © 8409-6906 Healthwise, Incorporated. Care instructions adapted under license by [facility] ([facility]). If you have questions about a medical condition or this instruction, always ask your healthcare professional. [place] [address] any warranty or liability for your use of this information.

## 2019-05-14 NOTE — PROGRESS NOTES
Subjective:      Patient ID: Walden PullingRose Blackman is a 47 y.o. female who presents today for:     Chief Complaint   Patient presents with    Medication Refill     Pt presents today for medication refills. Former pt of Dr. Luciano Faye.  Wrist Pain     Pt c/o Rt wrist pain x 2 months. Pt states when she woke up (2 months ago) she heard something pop. Wrist Pain    The pain is present in the right wrist. This is a new problem. The current episode started more than 1 month ago. There has been no history of extremity trauma. The problem occurs constantly. The problem has been unchanged. Associated symptoms include numbness and tingling. Pertinent negatives include no fever. Back Pain   This is a chronic problem. The current episode started more than 1 year ago. The problem occurs constantly. The problem is unchanged. The pain is present in the lumbar spine. The quality of the pain is described as aching. Associated symptoms include leg pain (right), numbness, paresthesias and tingling. Pertinent negatives include no abdominal pain, bladder incontinence, bowel incontinence, chest pain, dysuria, fever or perianal numbness. Treatments tried: tend unit and ablations, gabapentin. The treatment provided moderate relief. Has had 3 lumbar ablations and neck ablation. She sees Dr. Abisai Barahona due to herniated disc and hx of 2 failed laminectomies. Pt is interested in stopping smoking. She has been smoking for about a year and a half again. She smokes about 2-3 packs per week. She had quit for 21 years, but was having an emotional time and ended up smoking again. She is ready to quit again. She does report a hx of suicide ideation. She sees a psychiatrist. She was unable to see her for a while due to insurance and owing them money. She is feeling much better today and is not on any medication, but does have an appt with psych on June 3rd. Denies SI today.  She reports she is thankful for all she has to experience and that she is here to experience it. Pt does admit to some orthopnea. It can also be difficult for her to breath when she is lying on her side. She denies any leg swelling. She denies any cp, palpitations, or general sob. No MILLS. Pt would like BNP checked to screen for possible CHF.     Past Medical History:   Diagnosis Date    Anxiety     Bronchitis     Cancer ([address]     left breast    Chronic pain syndrome 10/6/2016    Colon polyps     Depression     suicide attempts    Duodenitis     Esophageal erosions     Family hx of colon cancer, Mother & Father 4/18/2017    Fibromyalgia     Foot deformity 10/6/2016    GERD (gastroesophageal reflux disease)     gastritis    Hypertension     Lumbar radiculopathy 5/10/2017    Osteoarthritis     TMJ arthritis 10/6/2016     Past Surgical History:   Procedure Laterality Date    BONE BIOPSY Right 09/14/2018    CT Guided Right Rib Fine Needle Aspiration by DR Shannan Mijares    BREAST SURGERY      breast bx/ lumpectomy    COLONOSCOPY      DILATION AND CURETTAGE OF UTERUS      ENDOSCOPY, COLON, DIAGNOSTIC      HYSTERECTOMY  2012    LAPAROSCOPY      LUMBAR DISCECTOMY      L4-L5    RI COLORECTAL SCRN; HI RISK IND N/A 4/18/2018    [facility] ESOPHAGOGASTRODUODENOSCOPY TRANSORAL DIAGNOSTIC N/A 5/11/2017    EGD ESOPHAGOGASTRODUODENOSCOPY performed by Shital Glover MD at [address] [place] [address]. S.W      UPPER GASTROINTESTINAL ENDOSCOPY       Family History   Problem Relation Age of Onset    Cancer Mother         colon    High Blood Pressure Mother     Heart Disease Mother     Colon Cancer Mother     Cancer Father         colon    High Blood Pressure Father     Colon Cancer Father      Social History     Socioeconomic History    Marital status:      Spouse name: Not on file    Number of children: Not on file    Years of education: Not on file    Highest education level: Not on file   Occupational History    Not on file   Social Needs    Financial resource strain: Not on file    Food insecurity:     Worry: Not on file     Inability: Not on file    Transportation needs:     Medical: Not on file     Non-medical: Not on file   Tobacco Use    Smoking status: Current Every Day Smoker     Packs/day: 0.50     Types: Cigarettes    Smokeless tobacco: Never Used   Substance and Sexual Activity    Alcohol use:  Yes     Alcohol/week: 0.0 oz     Comment: once in a while    Drug use: No    Sexual activity: Yes   Lifestyle    Physical activity:     Days per week: Not on file     Minutes per session: Not on file    Stress: Not on file   Relationships    Social connections:     Talks on phone: Not on file     Gets together: Not on file     Attends Judaism service: Not on file     Active member of club or organization: Not on file     Attends meetings of clubs or organizations: Not on file     Relationship status: Not on file    Intimate partner violence:     Fear of current or ex partner: Not on file     Emotionally abused: Not on file     Physically abused: Not on file     Forced sexual activity: Not on file   Other Topics Concern    Not on file   Social History Narrative    Not on file     Current Outpatient Medications on File Prior to Visit   Medication Sig Dispense Refill    diclofenac (VOLTAREN) 75 MG EC tablet       hydrochlorothiazide (HYDRODIURIL) 25 MG tablet TAKE 1/2 TABLET BY MOUTH EVERY DAY 90 tablet 1    pantoprazole (PROTONIX) 20 MG tablet TAKE 1 TABLET BY MOUTH EVERY DAY 90 tablet 1    lisinopril (PRINIVIL;ZESTRIL) 20 MG tablet Take 0.5 tablets by mouth daily 90 tablet 3    montelukast (SINGULAIR) 10 MG tablet TAKE 1 TABLET BY MOUTH EVERY NIGHT 90 tablet 3    albuterol sulfate HFA (PROAIR HFA) 108 (90 Base) MCG/ACT inhaler Inhale 2 puffs into the lungs every 6 hours as needed for Wheezing 1 Inhaler 3    tamoxifen (NOLVADEX) 10 MG tablet Take 1 tablet by mouth 2 times daily 180 tablet 0    diclofenac (VOLTAREN) 75 MG EC tablet Take 1 tablet by mouth 2 times daily 180 tablet 1    fluticasone (FLONASE) 50 MCG/ACT nasal spray 1 spray by Nasal route daily      cyclobenzaprine (FLEXERIL) 10 MG tablet Take 1 tablet by mouth 3 times daily as needed for Muscle spasms 270 tablet 0     No current facility-administered medications on file prior to visit. Allergies:  Ultram [tramadol]; Norco [hydrocodone-acetaminophen]; and Percocet [oxycodone-acetaminophen]    Review of Systems   Constitutional: Negative for chills, fatigue and fever. Respiratory: Negative for cough, shortness of breath and wheezing. Cardiovascular: Negative for chest pain and palpitations. Gastrointestinal: Negative for abdominal pain and bowel incontinence. Genitourinary: Negative for bladder incontinence and dysuria. Musculoskeletal: Positive for arthralgias, back pain, myalgias, neck pain and neck stiffness. Negative for gait problem and joint swelling. Neurological: Positive for tingling, numbness and paresthesias. Psychiatric/Behavioral: Negative for self-injury and suicidal ideas. The patient is nervous/anxious. Objective:   /72 (Site: Right Upper Arm, Position: Sitting, Cuff Size: Medium Adult)   Pulse 75   Temp 97.4 °F (36.3 °C) (Temporal)   Ht 5' 3\" (1.6 m)   Wt 145 lb (65.8 kg)   SpO2 97%   Breastfeeding? No   BMI 25.69 kg/m²     Physical Exam   Constitutional: She is oriented to person, place, and time. She appears well-developed and well-nourished. She is cooperative. HENT:   Head: Normocephalic. Right Ear: Tympanic membrane, external ear and ear canal normal.   Left Ear: Tympanic membrane, external ear and ear canal normal.   Nose: Nose normal.   Mouth/Throat: Uvula is midline, oropharynx is clear and moist and mucous membranes are normal.   Eyes: Conjunctivae are normal. Right eye exhibits no discharge.  Left eye exhibits no discharge. Neck: Normal range of motion. Cardiovascular: Normal rate, regular rhythm and normal heart sounds. Pulmonary/Chest: Effort normal and breath sounds normal. No respiratory distress. Musculoskeletal:        Right wrist: She exhibits tenderness and bony tenderness. She exhibits normal range of motion, no swelling, no effusion, no crepitus, no deformity and no laceration. Arms:  Lymphadenopathy:     She has no cervical adenopathy. Neurological: She is alert and oriented to person, place, and time. Skin: Skin is warm and dry. Psychiatric: Her behavior is normal. Her mood appears anxious. She exhibits a depressed mood. Assessment:          Diagnosis Orders   1. Lumbosacral spondylosis without myelopathy  gabapentin (NEURONTIN) 600 MG tablet   2. Postlaminectomy syndrome, lumbar  gabapentin (NEURONTIN) 600 MG tablet   3. Encounter for smoking cessation counseling  varenicline (CHANTIX STARTING MONTH PAK) 0.5 MG X 11 & 1 MG X 42 tablet   4. Right wrist pain     5. Tendonitis     6.  Orthopnea  CBC Auto Differential    Comprehensive Metabolic Panel    Lipid Panel    Brain Natriuretic Peptide       Plan:      Orders Placed This Encounter   Procedures    CBC Auto Differential     Standing Status:   Future     Number of Occurrences:   1     Standing Expiration Date:   5/13/2020    Comprehensive Metabolic Panel     Standing Status:   Future     Number of Occurrences:   1     Standing Expiration Date:   5/13/2020    Lipid Panel     Standing Status:   Future     Number of Occurrences:   1     Standing Expiration Date:   5/13/2020     Order Specific Question:   Is Patient Fasting?/# of Hours     Answer:   10    Brain Natriuretic Peptide     Standing Status:   Future     Number of Occurrences:   1     Standing Expiration Date:   5/13/2020          Orders Placed This Encounter   Medications    gabapentin (NEURONTIN) 600 MG tablet     Sig: TAKE 2 TABLETS BY MOUTH 2  TIMES DAILY (TAKE 1200 MG  IN THE MORNING & 1200 MG IN THE EVENING)     Dispense:  360 tablet     Refill:  0    varenicline (CHANTIX STARTING MONTH CLAIR) 0.5 MG X 11 & 1 MG X 42 tablet     Sig: Take 0.5 mg nightly x 3 nights then take 0.5mg twice daily x4 days then take 1mg twice daily     Dispense:  1 box     Refill:  0       Return in about 6 weeks (around 6/25/2019). Orthopnea- lungs CTA (although pt reports she \"feels clear now\"\") will check BNP and f/u accordingly. Back pain- gabapentin. Keep f/u with Dr. Jeremy England  Wrist pain- Wrist exercises given. Continue diclofenac for inflammation. It has been improving. She denies any trauma so doubtful to have any fractures. Pt declined XR at this time. Smoking cessation- copay card given and discussed she should call and activate it. New script sent in for her to start. Depression/anxiety- f/u with psychiatry. Reviewed with the patient: current clinicalstatus, medications, activities and diet. Side effects, adverse effects of the medication prescribedtoday, as well as treatment plan/ rationale and result expectations have been discussedwith the patient who expresses understanding and desires to proceed. Close follow upto evaluate treatment results and for coordination of care. I have reviewedthe patient's medical history in detail and updated the computerized patient record.     Yadira Cohen, VERÓNICA - CNP

## 2019-05-15 ENCOUNTER — TELEPHONE (OUTPATIENT)
Dept: OBGYN CLINIC | Age: 54
End: 2019-05-15

## 2019-05-15 DIAGNOSIS — N83.209 CYST OF OVARY, UNSPECIFIED LATERALITY: Primary | ICD-10-CM

## 2019-05-23 ENCOUNTER — HOSPITAL ENCOUNTER (OUTPATIENT)
Dept: ULTRASOUND IMAGING | Age: 54
Discharge: HOME | End: 2019-05-25
Payer: COMMERCIAL

## 2019-05-23 ENCOUNTER — HOSPITAL ENCOUNTER (OUTPATIENT)
Dept: WOMENS IMAGING | Age: 54
Discharge: HOME | End: 2019-05-25
Payer: COMMERCIAL

## 2019-05-23 DIAGNOSIS — Z12.39 BREAST CANCER SCREENING: ICD-10-CM

## 2019-05-23 DIAGNOSIS — N83.209 CYST OF OVARY, UNSPECIFIED LATERALITY: ICD-10-CM

## 2019-05-23 DIAGNOSIS — N83.202 CYST OF LEFT OVARY: ICD-10-CM

## 2019-05-23 PROCEDURE — 76817 TRANSVAGINAL US OBSTETRIC: CPT

## 2019-05-23 PROCEDURE — 76856 US EXAM PELVIC COMPLETE: CPT

## 2019-05-23 PROCEDURE — 77067 SCR MAMMO BI INCL CAD: CPT

## 2019-06-04 ENCOUNTER — TELEPHONE (OUTPATIENT)
Dept: FAMILY MEDICINE CLINIC | Age: 54
End: 2019-06-04

## 2019-06-04 NOTE — TELEPHONE ENCOUNTER
Patient's BP readings: 06/03/19 left arm-178/89, right arm 189/98.  06/04/19 left arm-174/98, right arm 182/109. Patient's phone # [phone]. Patient is requesting to be called back to know what to. Please advise.

## 2019-06-04 NOTE — TELEPHONE ENCOUNTER
Patient is aware of the symptoms and said she does not need to got to the ER, she will wait til 6/25/19 to see you for her appointment. This morning the patient doubled her lisinopril and hydrochlorothiazide medicine and got a lower BP.  Her BP in left arm - 150/91 and right arm - 152/91

## 2019-06-04 NOTE — TELEPHONE ENCOUNTER
If she's having any symptoms such as CP, sob, dizziness, severe headache, or one sided weakness she needs to go to ER. If she's not then she needs an appt to be evaluated and for treatment to be started.

## 2019-06-20 ENCOUNTER — OFFICE VISIT (OUTPATIENT)
Dept: FAMILY MEDICINE CLINIC | Age: 54
End: 2019-06-20
Payer: COMMERCIAL

## 2019-06-20 VITALS
OXYGEN SATURATION: 99 % | DIASTOLIC BLOOD PRESSURE: 80 MMHG | HEART RATE: 69 BPM | SYSTOLIC BLOOD PRESSURE: 130 MMHG | WEIGHT: 144.6 LBS | BODY MASS INDEX: 25.62 KG/M2 | TEMPERATURE: 98.4 F | HEIGHT: 63 IN

## 2019-06-20 DIAGNOSIS — E78.5 DYSLIPIDEMIA: ICD-10-CM

## 2019-06-20 DIAGNOSIS — K29.00 ACUTE GASTRITIS, PRESENCE OF BLEEDING UNSPECIFIED, UNSPECIFIED GASTRITIS TYPE: ICD-10-CM

## 2019-06-20 DIAGNOSIS — I10 ESSENTIAL HYPERTENSION: ICD-10-CM

## 2019-06-20 DIAGNOSIS — J30.9 ALLERGIC RHINITIS, UNSPECIFIED SEASONALITY, UNSPECIFIED TRIGGER: Primary | ICD-10-CM

## 2019-06-20 DIAGNOSIS — R00.2 PALPITATIONS: ICD-10-CM

## 2019-06-20 PROCEDURE — 99214 OFFICE O/P EST MOD 30 MIN: CPT | Performed by: NURSE PRACTITIONER

## 2019-06-20 RX ORDER — LISINOPRIL AND HYDROCHLOROTHIAZIDE 20; 12.5 MG/1; MG/1
TABLET ORAL
COMMUNITY
Start: 2019-06-06 | End: 2019-06-20 | Stop reason: SDUPTHER

## 2019-06-20 RX ORDER — LISINOPRIL AND HYDROCHLOROTHIAZIDE 20; 12.5 MG/1; MG/1
1 TABLET ORAL DAILY
Qty: 30 TABLET | Refills: 3 | Status: SHIPPED | OUTPATIENT
Start: 2019-06-20 | End: 2019-09-03 | Stop reason: SDUPTHER

## 2019-06-20 RX ORDER — AZELASTINE 1 MG/ML
1 SPRAY, METERED NASAL 2 TIMES DAILY
Qty: 1 BOTTLE | Refills: 3 | Status: SHIPPED | OUTPATIENT
Start: 2019-06-20 | End: 2021-02-09

## 2019-06-20 RX ORDER — PANTOPRAZOLE SODIUM 40 MG/1
40 TABLET, DELAYED RELEASE ORAL DAILY
Qty: 30 TABLET | Refills: 3 | Status: SHIPPED | OUTPATIENT
Start: 2019-06-20 | End: 2019-09-03 | Stop reason: SDUPTHER

## 2019-06-20 RX ORDER — ESCITALOPRAM OXALATE 10 MG/1
20 TABLET ORAL DAILY
COMMUNITY
Start: 2019-06-03 | End: 2021-11-17 | Stop reason: ALTCHOICE

## 2019-06-20 ASSESSMENT — ENCOUNTER SYMPTOMS
NAUSEA: 0
VOMITING: 0
COUGH: 0
BLOOD IN STOOL: 1
WHEEZING: 1
SINUS PRESSURE: 0
CONSTIPATION: 0
DIARRHEA: 1
SORE THROAT: 0
SHORTNESS OF BREATH: 0
ORTHOPNEA: 0
BLURRED VISION: 0
ABDOMINAL DISTENTION: 0
ABDOMINAL PAIN: 0
RECTAL PAIN: 0

## 2019-06-20 NOTE — PATIENT INSTRUCTIONS
if you think you are having a problem with your medicine. · Do not take any other medicine, including over-the-counter pain relievers, without talking to your doctor first.  · If your doctor recommends over-the-counter medicine to reduce stomach acid, such as Pepcid AC, Prilosec, Tagamet HB, or Zantac 75, follow the directions on the label. · Drink plenty of fluids (enough so that your urine is light yellow or clear like water) to prevent dehydration. Choose water and other caffeine-free clear liquids. If you have kidney, heart, or liver disease and have to limit fluids, talk with your doctor before you increase the amount of fluids you drink. · Limit how much alcohol you drink. · Avoid coffee, tea, cola drinks, chocolate, and other foods with caffeine. They increase stomach acid. When should you call for help? Call 911 anytime you think you may need emergency care. For example, call if:    · You vomit blood or what looks like coffee grounds.     · You pass maroon or very bloody stools.    Call your doctor now or seek immediate medical care if:    · You start breathing fast and have not produced urine in the last 8 hours.     · You cannot keep fluids down.    Watch closely for changes in your health, and be sure to contact your doctor if:    · You do not get better as expected. Where can you learn more? Go to [email or web address] and sign in to your Clover account. Enter [id number] in the [facility] box to learn more about \"Gastritis: Care Instructions. \"     If you do not have an account, please click on the \"Sign Up Now\" link. Current as of: November 7, 2018  Content Version: 12.0  © 8131-5579 Healthwise, Incorporated. Care instructions adapted under license by [facility] ([facility]).  If you have questions about a medical condition or this instruction, always ask your healthcare professional. [address] any warranty or liability for your use of this information.

## 2019-06-20 NOTE — PROGRESS NOTES
Subjective:      Patient ID: Ana Luisa Zuñiga. Jeannie Nyhan is a 47 y.o. female who presents today for:     Chief Complaint   Patient presents with    Hypertension     Pt presents today c/o elevated BP. Pt states she was at [facility] on 06/05/2019 for GI bleed and her BP was 180/120. Pt states they changed her BP medication to lisinopril hctz. Hypertension   This is a chronic problem. The current episode started more than 1 year ago. The problem is unchanged. The problem is controlled. Pertinent negatives include no anxiety, blurred vision, chest pain, headaches, malaise/fatigue, neck pain, orthopnea, palpitations, peripheral edema, PND, shortness of breath or sweats. Past treatments include ACE inhibitors and diuretics. The current treatment provides significant improvement. Pt reports she had bloody diarrhea and went to ER on the 3rd day. She reports they gave her flagyl and levaquin IV and blood work with fluids. She reports they sent her home with levaquin and flagyl and clear liquids for 3 days. She is also taking protonix 20mg daily. She reports that it is just now starting to be soft, formed. She denies any abdominal pain or cramping. Pt reports that she has an appt with GI on July 1st.     Pt reports that she has noticed that the singulair doesn't seem to be helping with breathing and allergies. She reports she still has some wheezing at night.  Denies any sob, cough, etc.     Past Medical History:   Diagnosis Date    Anxiety     Bronchitis     Cancer ([address]     left breast    Chronic pain syndrome 10/6/2016    Colon polyps     Depression     suicide attempts    Duodenitis     Esophageal erosions     Family hx of colon cancer, Mother & Father 4/18/2017    Fibromyalgia     Foot deformity 10/6/2016    GERD (gastroesophageal reflux disease)     gastritis    Hypertension     Lumbar radiculopathy 5/10/2017    Osteoarthritis     TMJ arthritis 10/6/2016     Past Surgical History:   Procedure Laterality Positive for wheezing (hs). Negative for cough and shortness of breath. Cardiovascular: Negative for chest pain, palpitations, orthopnea, leg swelling and PND. Gastrointestinal: Positive for blood in stool (resolved) and diarrhea. Negative for abdominal distention, abdominal pain, constipation, nausea, rectal pain and vomiting. Musculoskeletal: Negative for neck pain. Neurological: Negative for headaches. Objective:   /80 (Site: Left Upper Arm, Position: Sitting, Cuff Size: Medium Adult)   Pulse 69   Temp 98.4 °F (36.9 °C) (Temporal)   Ht 5' 3\" (1.6 m)   Wt 144 lb 9.6 oz (65.6 kg)   SpO2 99%   Breastfeeding? No   BMI 25.61 kg/m²     Physical Exam   Constitutional: She is oriented to person, place, and time. She appears well-developed and well-nourished. HENT:   Head: Normocephalic. Right Ear: Tympanic membrane, external ear and ear canal normal.   Left Ear: Tympanic membrane, external ear and ear canal normal.   Nose: Mucosal edema and rhinorrhea present. Mouth/Throat: Uvula is midline, oropharynx is clear and moist and mucous membranes are normal. No oropharyngeal exudate, posterior oropharyngeal edema, posterior oropharyngeal erythema or tonsillar abscesses. Eyes: Conjunctivae are normal. Right eye exhibits no discharge. Left eye exhibits no discharge. Neck: Normal range of motion. Cardiovascular: Normal rate, regular rhythm and normal heart sounds. Pulmonary/Chest: Effort normal and breath sounds normal. No respiratory distress. Abdominal: Soft. Bowel sounds are normal. There is tenderness in the epigastric area. Lymphadenopathy:     She has no cervical adenopathy. Neurological: She is alert and oriented to person, place, and time. Skin: Skin is warm and dry. Psychiatric: She has a normal mood and affect. Her behavior is normal.       Assessment:          Diagnosis Orders   1.  Allergic rhinitis, unspecified seasonality, unspecified trigger  azelastine medication prescribedtoday, as well as treatment plan/ rationale and result expectations have been discussedwith the patient who expresses understanding and desires to proceed. Close follow upto evaluate treatment results and for coordination of care. I have reviewedthe patient's medical history in detail and updated the computerized patient record.     Russell García, VERÓNICA - CNP

## 2019-07-29 DIAGNOSIS — E78.5 DYSLIPIDEMIA: ICD-10-CM

## 2019-07-29 LAB
CHOLESTEROL, TOTAL: 262 MG/DL (ref 0–199)
HDLC SERPL-MCNC: 70 MG/DL (ref 40–59)
LDL CHOLESTEROL CALCULATED: 177 MG/DL (ref 0–129)
TRIGL SERPL-MCNC: 76 MG/DL (ref 0–150)

## 2019-08-02 ENCOUNTER — HOSPITAL ENCOUNTER (OUTPATIENT)
Dept: NUCLEAR MEDICINE | Age: 54
Discharge: HOME | End: 2019-08-04
Payer: MEDICARE

## 2019-08-02 ENCOUNTER — HOSPITAL ENCOUNTER (OUTPATIENT)
Dept: NON INVASIVE DIAGNOSTICS | Age: 54
Discharge: HOME | End: 2019-08-02
Payer: MEDICARE

## 2019-08-02 VITALS — DIASTOLIC BLOOD PRESSURE: 80 MMHG | SYSTOLIC BLOOD PRESSURE: 128 MMHG

## 2019-08-02 DIAGNOSIS — R00.2 PALPITATIONS: ICD-10-CM

## 2019-08-02 DIAGNOSIS — I10 ESSENTIAL HYPERTENSION: ICD-10-CM

## 2019-08-02 PROCEDURE — 6360000004 HC RX CONTRAST MEDICATION: Performed by: NURSE PRACTITIONER

## 2019-08-02 PROCEDURE — A9502 TC99M TETROFOSMIN: HCPCS | Performed by: NURSE PRACTITIONER

## 2019-08-02 PROCEDURE — 78452 HT MUSCLE IMAGE SPECT MULT: CPT

## 2019-08-02 PROCEDURE — 3430000000 HC RX DIAGNOSTIC RADIOPHARMACEUTICAL: Performed by: NURSE PRACTITIONER

## 2019-08-02 PROCEDURE — 93017 CV STRESS TEST TRACING ONLY: CPT

## 2019-08-02 PROCEDURE — 2580000003 HC RX 258: Performed by: NURSE PRACTITIONER

## 2019-08-02 RX ORDER — SODIUM CHLORIDE 0.9 % (FLUSH) 0.9 %
10 SYRINGE (ML) INJECTION PRN
Status: DISCONTINUED | OUTPATIENT
Start: 2019-08-02 | End: 2019-08-05 | Stop reason: HOSPADM

## 2019-08-02 RX ADMIN — Medication 10 ML: at 10:57

## 2019-08-02 RX ADMIN — REGADENOSON 0.4 MG: 0.08 INJECTION, SOLUTION INTRAVENOUS at 10:57

## 2019-08-02 RX ADMIN — TETROFOSMIN 10.9 MILLICURIE: 1.38 INJECTION, POWDER, LYOPHILIZED, FOR SOLUTION INTRAVENOUS at 08:57

## 2019-08-02 RX ADMIN — Medication 10 ML: at 08:57

## 2019-08-02 RX ADMIN — Medication 10 ML: at 10:58

## 2019-08-02 RX ADMIN — TETROFOSMIN 31.2 MILLICURIE: 1.38 INJECTION, POWDER, LYOPHILIZED, FOR SOLUTION INTRAVENOUS at 10:58

## 2019-08-05 PROCEDURE — 93018 CV STRESS TEST I&R ONLY: CPT | Performed by: INTERNAL MEDICINE

## 2019-08-06 ENCOUNTER — OFFICE VISIT (OUTPATIENT)
Dept: FAMILY MEDICINE CLINIC | Age: 54
End: 2019-08-06
Payer: COMMERCIAL

## 2019-08-06 VITALS
SYSTOLIC BLOOD PRESSURE: 126 MMHG | BODY MASS INDEX: 25.41 KG/M2 | HEIGHT: 63 IN | HEART RATE: 74 BPM | OXYGEN SATURATION: 97 % | WEIGHT: 143.4 LBS | TEMPERATURE: 96.9 F | DIASTOLIC BLOOD PRESSURE: 72 MMHG

## 2019-08-06 DIAGNOSIS — K29.00 ACUTE GASTRITIS WITHOUT HEMORRHAGE, UNSPECIFIED GASTRITIS TYPE: ICD-10-CM

## 2019-08-06 DIAGNOSIS — E78.5 DYSLIPIDEMIA: Primary | ICD-10-CM
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
## 2019-08-06 DIAGNOSIS — I10 HYPERTENSION, UNSPECIFIED TYPE: ICD-10-CM

## 2019-08-06 DIAGNOSIS — M25.531 RIGHT WRIST PAIN: ICD-10-CM

## 2019-08-06 PROCEDURE — 99214 OFFICE O/P EST MOD 30 MIN: CPT | Performed by: NURSE PRACTITIONER

## 2019-08-06 RX ORDER — DICYCLOMINE HYDROCHLORIDE 10 MG/1
CAPSULE ORAL
COMMUNITY
Start: 2019-07-01 | End: 2020-07-06 | Stop reason: SDUPTHER

## 2019-08-06 RX ORDER — BUPROPION HYDROCHLORIDE 300 MG/1
150 TABLET ORAL
COMMUNITY
Start: 2019-07-26 | End: 2022-04-11 | Stop reason: ALTCHOICE

## 2019-08-06 ASSESSMENT — ENCOUNTER SYMPTOMS
COUGH: 0
CONSTIPATION: 0
BLURRED VISION: 0
RECTAL PAIN: 0
NAUSEA: 0
ABDOMINAL PAIN: 1
BLOOD IN STOOL: 0
WHEEZING: 0
ABDOMINAL DISTENTION: 0
ORTHOPNEA: 0
VOMITING: 0
BACK PAIN: 0
SHORTNESS OF BREATH: 0
ANAL BLEEDING: 0
DIARRHEA: 0

## 2019-08-29 DIAGNOSIS — K29.00 ACUTE GASTRITIS, PRESENCE OF BLEEDING UNSPECIFIED, UNSPECIFIED GASTRITIS TYPE: ICD-10-CM

## 2019-08-29 DIAGNOSIS — I10 ESSENTIAL HYPERTENSION: ICD-10-CM

## 2019-08-30 RX ORDER — PANTOPRAZOLE SODIUM 40 MG/1
TABLET, DELAYED RELEASE ORAL
Qty: 90 TABLET | Refills: 3 | OUTPATIENT
Start: 2019-08-30

## 2019-08-30 RX ORDER — LISINOPRIL AND HYDROCHLOROTHIAZIDE 20; 12.5 MG/1; MG/1
TABLET ORAL
Qty: 90 TABLET | Refills: 3 | OUTPATIENT
Start: 2019-08-30

## 2019-09-03 DIAGNOSIS — K29.00 ACUTE GASTRITIS, PRESENCE OF BLEEDING UNSPECIFIED, UNSPECIFIED GASTRITIS TYPE: ICD-10-CM

## 2019-09-03 DIAGNOSIS — I10 ESSENTIAL HYPERTENSION: ICD-10-CM

## 2019-09-03 RX ORDER — LISINOPRIL AND HYDROCHLOROTHIAZIDE 20; 12.5 MG/1; MG/1
1 TABLET ORAL DAILY
Qty: 30 TABLET | Refills: 3 | Status: SHIPPED | OUTPATIENT
Start: 2019-09-03 | End: 2020-01-06 | Stop reason: SDUPTHER

## 2019-09-03 RX ORDER — PANTOPRAZOLE SODIUM 40 MG/1
40 TABLET, DELAYED RELEASE ORAL DAILY
Qty: 30 TABLET | Refills: 3 | Status: SHIPPED | OUTPATIENT
Start: 2019-09-03 | End: 2020-01-06 | Stop reason: SDUPTHER

## 2019-12-02 DIAGNOSIS — I10 HYPERTENSION, UNSPECIFIED TYPE: ICD-10-CM

## 2019-12-02 DIAGNOSIS — E78.5 DYSLIPIDEMIA: ICD-10-CM

## 2019-12-02 LAB
ANION GAP SERPL CALCULATED.3IONS-SCNC: 12 MEQ/L (ref 9–15)
BUN BLDV-MCNC: 13 MG/DL (ref 6–20)
CALCIUM SERPL-MCNC: 9.5 MG/DL (ref 8.5–9.9)
CHLORIDE BLD-SCNC: 104 MEQ/L (ref 95–107)
CHOLESTEROL, TOTAL: 253 MG/DL (ref 0–199)
CO2: 27 MEQ/L (ref 20–31)
CREAT SERPL-MCNC: 0.64 MG/DL (ref 0.5–0.9)
GFR AFRICAN AMERICAN: >60
GFR NON-AFRICAN AMERICAN: >60
GLUCOSE BLD-MCNC: 93 MG/DL (ref 70–99)
HDLC SERPL-MCNC: 78 MG/DL (ref 40–59)
LDL CHOLESTEROL CALCULATED: 159 MG/DL (ref 0–129)
POTASSIUM SERPL-SCNC: 4.6 MEQ/L (ref 3.4–4.9)
SODIUM BLD-SCNC: 143 MEQ/L (ref 135–144)
TRIGL SERPL-MCNC: 79 MG/DL (ref 0–150)

## 2020-01-06 ENCOUNTER — OFFICE VISIT (OUTPATIENT)
Dept: FAMILY MEDICINE CLINIC | Age: 55
End: 2020-01-06
Payer: MEDICARE

## 2020-01-06 VITALS
HEIGHT: 63 IN | TEMPERATURE: 98 F | HEART RATE: 98 BPM | BODY MASS INDEX: 25.87 KG/M2 | DIASTOLIC BLOOD PRESSURE: 70 MMHG | SYSTOLIC BLOOD PRESSURE: 126 MMHG | OXYGEN SATURATION: 97 % | WEIGHT: 146 LBS

## 2020-01-06 PROCEDURE — G8427 DOCREV CUR MEDS BY ELIG CLIN: HCPCS | Performed by: NURSE PRACTITIONER

## 2020-01-06 PROCEDURE — 99214 OFFICE O/P EST MOD 30 MIN: CPT | Performed by: NURSE PRACTITIONER

## 2020-01-06 PROCEDURE — G8484 FLU IMMUNIZE NO ADMIN: HCPCS | Performed by: NURSE PRACTITIONER

## 2020-01-06 PROCEDURE — 4004F PT TOBACCO SCREEN RCVD TLK: CPT | Performed by: NURSE PRACTITIONER

## 2020-01-06 PROCEDURE — G8419 CALC BMI OUT NRM PARAM NOF/U: HCPCS | Performed by: NURSE PRACTITIONER

## 2020-01-06 PROCEDURE — 3017F COLORECTAL CA SCREEN DOC REV: CPT | Performed by: NURSE PRACTITIONER

## 2020-01-06 RX ORDER — PANTOPRAZOLE SODIUM 40 MG/1
40 TABLET, DELAYED RELEASE ORAL DAILY
Qty: 30 TABLET | Refills: 5 | Status: SHIPPED | OUTPATIENT
Start: 2020-01-06 | End: 2020-08-13 | Stop reason: SDUPTHER

## 2020-01-06 RX ORDER — VARENICLINE TARTRATE 25 MG
KIT ORAL SEE ADMIN INSTRUCTIONS
COMMUNITY
End: 2020-09-04

## 2020-01-06 RX ORDER — HYDROXYZINE 50 MG/1
50 TABLET, FILM COATED ORAL 3 TIMES DAILY PRN
Qty: 90 TABLET | Refills: 1 | Status: SHIPPED | OUTPATIENT
Start: 2020-01-06 | End: 2020-02-05

## 2020-01-06 RX ORDER — LISINOPRIL AND HYDROCHLOROTHIAZIDE 20; 12.5 MG/1; MG/1
1 TABLET ORAL DAILY
Qty: 30 TABLET | Refills: 5 | Status: SHIPPED | OUTPATIENT
Start: 2020-01-06 | End: 2020-08-13 | Stop reason: SDUPTHER

## 2020-01-06 ASSESSMENT — ENCOUNTER SYMPTOMS
ORTHOPNEA: 0
BLURRED VISION: 0
SHORTNESS OF BREATH: 0
COLOR CHANGE: 0
COUGH: 0
WHEEZING: 0
ROS SKIN COMMENTS: ITCHING
BACK PAIN: 1
SORE THROAT: 0

## 2020-01-06 NOTE — PROGRESS NOTES
Subjective:      Patient ID: Bessie Coloradoi. Saray Machado is a 49 y.o. female who presents today for:     Chief Complaint   Patient presents with    Results     Patient presents today to discuss lab results from 12/02/19. Hypertension   This is a chronic problem. The current episode started more than 1 year ago. The problem is unchanged. The problem is controlled. Pertinent negatives include no anxiety, blurred vision, chest pain, headaches, malaise/fatigue, neck pain, orthopnea, palpitations, peripheral edema, shortness of breath or sweats. Risk factors for coronary artery disease include dyslipidemia. Past treatments include ACE inhibitors and diuretics. The current treatment provides significant improvement. There are no compliance problems. Pt in today to f/u on lab results from 12/2/19. She had BMP and cholesterol checked. She is working on diet for cholesterol. She prefers to not take anything. BMP good. Cholesterol improving with dietary change. She has been unable to exercise due to hip pain. Sees Dr. Frank Stanford for pain in neck, lower back, hip, and knee. Can't take pain medication due to side effects. Can't take oral NSAIDs due to hx of GI bleed. Pt reports she has been having allergy flare and the singulair wasn't working so she stopped it. She reports she takes astelin daily, but still has a flare. She also gets itchy in certain areas like left thigh and right foot. She reports that sometimes she will scratch so hard that it will bruise.      Past Medical History:   Diagnosis Date    Anxiety     Bronchitis     Cancer ([address]     left breast    Chronic pain syndrome 10/6/2016    Colon polyps     Depression     suicide attempts    Duodenitis     Esophageal erosions     Family hx of colon cancer, Mother & Father 4/18/2017    Fibromyalgia     Foot deformity 10/6/2016    GERD (gastroesophageal reflux disease)     gastritis    Hypertension     Lumbar radiculopathy 5/10/2017    Osteoarthritis  TMJ arthritis 10/6/2016     Past Surgical History:   Procedure Laterality Date    BONE BIOPSY Right 09/14/2018    CT Guided Right Rib Fine Needle Aspiration by DR Patrizia Aranda    BREAST SURGERY      breast bx/ lumpectomy    COLONOSCOPY      DILATION AND CURETTAGE OF UTERUS      ENDOSCOPY, COLON, DIAGNOSTIC      HYSTERECTOMY  2012    LAPAROSCOPY      LUMBAR DISCECTOMY      L4-L5    [place] COLORECTAL SCRN; HI RISK IND N/A 4/18/2018    COLONOSCOPY performed by Bryan Jackson MD at [address] ESOPHAGOGASTRODUODENOSCOPY TRANSORAL DIAGNOSTIC N/A 5/11/2017    EGD ESOPHAGOGASTRODUODENOSCOPY performed by Bryan Jackson MD at [address] [place] [address]. S.W      UPPER GASTROINTESTINAL ENDOSCOPY       Family History   Problem Relation Age of Onset    Cancer Mother         colon    High Blood Pressure Mother     Heart Disease Mother     Colon Cancer Mother     Cancer Father         colon    High Blood Pressure Father     Colon Cancer Father      Social History     Socioeconomic History    Marital status:      Spouse name: Not on file    Number of children: Not on file    Years of education: Not on file    Highest education level: Not on file   Occupational History    Not on file   Social Needs    Financial resource strain: Not on file    Food insecurity:     Worry: Not on file     Inability: Not on file    Transportation needs:     Medical: Not on file     Non-medical: Not on file   Tobacco Use    Smoking status: Current Every Day Smoker     Packs/day: 0.50     Types: Cigarettes    Smokeless tobacco: Never Used   Substance and Sexual Activity    Alcohol use:  Yes     Alcohol/week: 0.0 standard drinks     Comment: once in a while    Drug use: No    Sexual activity: Yes   Lifestyle    Physical activity:     Days per week: Not on file     Minutes per session: Not on file    Stress: Not on file   Relationships    Social connections:     Talks on phone: Not on file     Gets together: Not on file     Attends Gnosticism service: Not on file     Active member of club or organization: Not on file     Attends meetings of clubs or organizations: Not on file     Relationship status: Not on file    Intimate partner violence:     Fear of current or ex partner: Not on file     Emotionally abused: Not on file     Physically abused: Not on file     Forced sexual activity: Not on file   Other Topics Concern    Not on file   Social History Narrative    Not on file     Current Outpatient Medications on File Prior to Visit   Medication Sig Dispense Refill    varenicline (CHANTIX STARTING MONTH PAK) 0.5 MG X 11 & 1 MG X 42 tablet Take by mouth See Admin Instructions Take by mouth.  gabapentin (NEURONTIN) 600 MG tablet Take 2 tablets by mouth 2 times daily for 90 days. TAKE 2 TABLETS BY MOUTH 2  TIMES DAILY (TAKE 1200 MG  IN THE MORNING & 1200 MG IN THE EVENING) 360 tablet 1    buPROPion (WELLBUTRIN XL) 300 MG extended release tablet       dicyclomine (BENTYL) 10 MG capsule       escitalopram (LEXAPRO) 10 MG tablet       albuterol sulfate HFA (PROAIR HFA) 108 (90 Base) MCG/ACT inhaler Inhale 2 puffs into the lungs every 6 hours as needed for Wheezing 1 Inhaler 3    azelastine (ASTELIN) 0.1 % nasal spray 1 spray by Nasal route 2 times daily Use in each nostril as directed (Patient not taking: Reported on 1/6/2020) 1 Bottle 3    montelukast (SINGULAIR) 10 MG tablet TAKE 1 TABLET BY MOUTH EVERY NIGHT (Patient not taking: Reported on 1/6/2020) 90 tablet 3    fluticasone (FLONASE) 50 MCG/ACT nasal spray 1 spray by Nasal route daily       No current facility-administered medications on file prior to visit. Allergies:  Ultram [tramadol]; Norco [hydrocodone-acetaminophen]; and Percocet [oxycodone-acetaminophen]    Review of Systems   Constitutional: Negative for chills, fatigue, fever and malaise/fatigue.    HENT: Negative for

## 2020-01-06 NOTE — PATIENT INSTRUCTIONS
Patient Education        DASH Diet: Care Instructions  Your Care Instructions    The DASH diet is an eating plan that can help lower your blood pressure. DASH stands for Dietary Approaches to Stop Hypertension. Hypertension is high blood pressure. The DASH diet focuses on eating foods that are high in calcium, potassium, and magnesium. These nutrients can lower blood pressure. The foods that are highest in these nutrients are fruits, vegetables, low-fat dairy products, nuts, seeds, and legumes. But taking calcium, potassium, and magnesium supplements instead of eating foods that are high in those nutrients does not have the same effect. The DASH diet also includes whole grains, fish, and poultry. The DASH diet is one of several lifestyle changes your doctor may recommend to lower your high blood pressure. Your doctor may also want you to decrease the amount of sodium in your diet. Lowering sodium while following the DASH diet can lower blood pressure even further than just the DASH diet alone. Follow-up care is a key part of your treatment and safety. Be sure to make and go to all appointments, and call your doctor if you are having problems. It's also a good idea to know your test results and keep a list of the medicines you take. How can you care for yourself at home? Following the DASH diet  · Eat 4 to 5 servings of fruit each day. A serving is 1 medium-sized piece of fruit, ½ cup chopped or canned fruit, 1/4 cup dried fruit, or 4 ounces (½ cup) of fruit juice. Choose fruit more often than fruit juice. · Eat 4 to 5 servings of vegetables each day. A serving is 1 cup of lettuce or raw leafy vegetables, ½ cup of chopped or cooked vegetables, or 4 ounces (½ cup) of vegetable juice. Choose vegetables more often than vegetable juice. · Get 2 to 3 servings of low-fat and fat-free dairy each day. A serving is 8 ounces of milk, 1 cup of yogurt, or 1 ½ ounces of cheese. · Eat 6 to 8 servings of grains each day. A serving is 1 slice of bread, 1 ounce of dry cereal, or ½ cup of cooked rice, pasta, or cooked cereal. Try to choose whole-grain products as much as possible. · Limit lean meat, poultry, and fish to 2 servings each day. A serving is 3 ounces, about the size of a deck of cards. · Eat 4 to 5 servings of nuts, seeds, and legumes (cooked dried beans, lentils, and split peas) each week. A serving is 1/3 cup of nuts, 2 tablespoons of seeds, or ½ cup of cooked beans or peas. · Limit fats and oils to 2 to 3 servings each day. A serving is 1 teaspoon of vegetable oil or 2 tablespoons of salad dressing. · Limit sweets and added sugars to 5 servings or less a week. A serving is 1 tablespoon jelly or jam, ½ cup sorbet, or 1 cup of lemonade. · Eat less than 2,300 milligrams (mg) of sodium a day. If you limit your sodium to 1,500 mg a day, you can lower your blood pressure even more. Tips for success  · Start small. Do not try to make dramatic changes to your diet all at once. You might feel that you are missing out on your favorite foods and then be more likely to not follow the plan. Make small changes, and stick with them. Once those changes become habit, add a few more changes. · Try some of the following:  ? Make it a goal to eat a fruit or vegetable at every meal and at snacks. This will make it easy to get the recommended amount of fruits and vegetables each day. ? Try yogurt topped with fruit and nuts for a snack or healthy dessert. ? Add lettuce, tomato, cucumber, and onion to sandwiches. ? Combine a ready-made pizza crust with low-fat mozzarella cheese and lots of vegetable toppings. Try using tomatoes, squash, spinach, broccoli, carrots, cauliflower, and onions. ? Have a variety of cut-up vegetables with a low-fat dip as an appetizer instead of chips and dip. ? Sprinkle sunflower seeds or chopped almonds over salads. Or try adding chopped walnuts or almonds to cooked vegetables.   ? Try some vegetarian meals using beans and peas. Add garbanzo or kidney beans to salads. Make burritos and tacos with mashed berger beans or black beans. Where can you learn more? Go to https://chlilyeb.Lumiata. org and sign in to your [employer] account. Enter W768 in the Farfetch box to learn more about \"DASH Diet: Care Instructions. \"     If you do not have an account, please click on the \"Sign Up Now\" link. Current as of: July 22, 2018  Content Version: 12.1  © 8697-7861 [employer], [employer]. Care instructions adapted under license by [facility] ([facility]). If you have questions about a medical condition or this instruction, always ask your healthcare professional. [address] any warranty or liability for your use of this information.

## 2020-04-13 ENCOUNTER — TELEPHONE (OUTPATIENT)
Dept: FAMILY MEDICINE CLINIC | Age: 55
End: 2020-04-13

## 2020-04-13 ASSESSMENT — LIFESTYLE VARIABLES: HOW OFTEN DO YOU HAVE A DRINK CONTAINING ALCOHOL: 0

## 2020-04-14 ENCOUNTER — VIRTUAL VISIT (OUTPATIENT)
Dept: FAMILY MEDICINE CLINIC | Age: 55
End: 2020-04-14
Payer: MEDICARE

## 2020-04-14 VITALS
HEART RATE: 75 BPM | WEIGHT: 144.4 LBS | RESPIRATION RATE: 14 BRPM | HEIGHT: 63 IN | BODY MASS INDEX: 25.59 KG/M2 | SYSTOLIC BLOOD PRESSURE: 137 MMHG | DIASTOLIC BLOOD PRESSURE: 95 MMHG

## 2020-04-14 PROCEDURE — 3017F COLORECTAL CA SCREEN DOC REV: CPT | Performed by: NURSE PRACTITIONER

## 2020-04-14 PROCEDURE — G0438 PPPS, INITIAL VISIT: HCPCS | Performed by: NURSE PRACTITIONER

## 2020-04-14 RX ORDER — DOXYCYCLINE HYCLATE 100 MG
100 TABLET ORAL 2 TIMES DAILY
Qty: 20 TABLET | Refills: 0 | Status: SHIPPED | OUTPATIENT
Start: 2020-04-14 | End: 2020-04-24

## 2020-04-14 RX ORDER — PREDNISONE 10 MG/1
40 TABLET ORAL DAILY
Qty: 16 TABLET | Refills: 0 | Status: SHIPPED | OUTPATIENT
Start: 2020-04-14 | End: 2020-04-18

## 2020-04-14 ASSESSMENT — PATIENT HEALTH QUESTIONNAIRE - PHQ9
SUM OF ALL RESPONSES TO PHQ QUESTIONS 1-9: 2
SUM OF ALL RESPONSES TO PHQ QUESTIONS 1-9: 2

## 2020-04-14 ASSESSMENT — LIFESTYLE VARIABLES: HOW OFTEN DO YOU HAVE A DRINK CONTAINING ALCOHOL: 0

## 2020-04-14 NOTE — PROGRESS NOTES
Pt also reports cough since February when she went to visit [facility] for her mother's . She reports it started with head congestion and cough. The head congestion has resolved, but her cough remains. She reports it is wore in the morning. She gets mild sputum production that is green/yellow and thick in the morning. She gets mild sob with exertion and some wheezing. She denies any fevers or chills. She appears in generally good health on exam. Ears, nose, eyes, and mouth appear normal. Mucous membranes are moist. Breathing is even and unlabored. HR is normal.     Discussed that given duration of cough and wheezing I would start treatment. Will limit amount of prednisone since it can decrease the immune system. Encouraged increase in fluids. Mucinex okay to help thin secretions. Albuterol inhaler prn. She was agreeable.
Historical Provider, MD   azelastine (ASTELIN) 0.1 % nasal spray 1 spray by Nasal route 2 times daily Use in each nostril as directed  Patient not taking: Reported on 1/6/2020  Karan Olszewski, APRN - CNP   albuterol sulfate HFA (PROAIR HFA) 108 (90 Base) MCG/ACT inhaler Inhale 2 puffs into the lungs every 6 hours as needed for Wheezing  Justin Decker DO   fluticasone (FLONASE) 50 MCG/ACT nasal spray 1 spray by Nasal route daily  Historical Provider, MD         Past Medical History:   Diagnosis Date    Anxiety     Bronchitis     Cancer ([address]     left breast    Chronic pain syndrome 10/6/2016    Colon polyps     Depression     suicide attempts    Duodenitis     Esophageal erosions     Family hx of colon cancer, Mother & Father 4/18/2017    Fibromyalgia     Foot deformity 10/6/2016    GERD (gastroesophageal reflux disease)     gastritis    Hypertension     Lumbar radiculopathy 5/10/2017    Osteoarthritis     TMJ arthritis 10/6/2016       Past Surgical History:   Procedure Laterality Date    BONE BIOPSY Right 09/14/2018    CT Guided Right Rib Fine Needle Aspiration by DR Asif Fails    BREAST SURGERY      breast bx/ lumpectomy    COLONOSCOPY      DILATION AND CURETTAGE OF UTERUS      ENDOSCOPY, COLON, DIAGNOSTIC      HYSTERECTOMY  2012    LAPAROSCOPY      LUMBAR DISCECTOMY      L4-L5    [place] COLORECTAL SCRN; HI RISK IND N/A 4/18/2018    [facility] ESOPHAGOGA[facility]ODUODENOSCOPY TRANSORAL DIAGNOSTIC N/A 5/11/2017    EGD ESOPHAGOGASTRODUODENOSCOPY performed by Alexandria Guerrier MD at [address] [place], [place] [address]vd. [address]      UPPER GASTROINTESTINAL ENDOSCOPY           Family History   Problem Relation Age of Onset    Cancer Mother         colon    High Blood Pressure Mother     Heart Disease Mother     Colon Cancer Mother     Cancer Father         colon    High Blood Pressure Father

## 2020-04-14 NOTE — PATIENT INSTRUCTIONS
Personalized Preventive Plan for [place] - 4/14/2020  Medicare offers a range of preventive health benefits. Some of the tests and screenings are paid in full while other may be subject to a deductible, co-insurance, and/or copay. Some of these benefits include a comprehensive review of your medical history including lifestyle, illnesses that may run in your family, and various assessments and screenings as appropriate. After reviewing your medical record and screening and assessments performed today your provider may have ordered immunizations, labs, imaging, and/or referrals for you. A list of these orders (if applicable) as well as your Preventive Care list are included within your After Visit Summary for your review. Other Preventive Recommendations:    · A preventive eye exam performed by an eye specialist is recommended every 1-2 years to screen for glaucoma; cataracts, macular degeneration, and other eye disorders. · A preventive dental visit is recommended every 6 months. · Try to get at least 150 minutes of exercise per week or 10,000 steps per day on a pedometer . · Order or download the FREE \"Exercise & Physical Activity: Your Everyday Guide\" from [employer]. Call 3-583.794.6489 or search [employer] online. · You need 6560-6412 mg of calcium and 1676-5543 IU of vitamin D per day. It is possible to meet your calcium requirement with diet alone, but a vitamin D supplement is usually necessary to meet this goal.  · When exposed to the sun, use a sunscreen that protects against both UVA and UVB radiation with an SPF of 30 or greater. Reapply every 2 to 3 hours or after sweating, drying off with a towel, or swimming. · Always wear a seat belt when traveling in a car. Always wear a helmet when riding a bicycle or motorcycle.

## 2020-07-06 ENCOUNTER — VIRTUAL VISIT (OUTPATIENT)
Dept: FAMILY MEDICINE CLINIC | Age: 55
End: 2020-07-06
Payer: MEDICARE

## 2020-07-06 PROCEDURE — 4004F PT TOBACCO SCREEN RCVD TLK: CPT | Performed by: NURSE PRACTITIONER

## 2020-07-06 PROCEDURE — G8427 DOCREV CUR MEDS BY ELIG CLIN: HCPCS | Performed by: NURSE PRACTITIONER

## 2020-07-06 PROCEDURE — 3017F COLORECTAL CA SCREEN DOC REV: CPT | Performed by: NURSE PRACTITIONER

## 2020-07-06 PROCEDURE — 99214 OFFICE O/P EST MOD 30 MIN: CPT | Performed by: NURSE PRACTITIONER

## 2020-07-06 PROCEDURE — G8419 CALC BMI OUT NRM PARAM NOF/U: HCPCS | Performed by: NURSE PRACTITIONER

## 2020-07-06 RX ORDER — DICYCLOMINE HYDROCHLORIDE 10 MG/1
10 CAPSULE ORAL
Qty: 120 CAPSULE | Refills: 0 | Status: SHIPPED | OUTPATIENT
Start: 2020-07-06 | End: 2020-10-01

## 2020-07-06 RX ORDER — PREDNISONE 10 MG/1
TABLET ORAL
Qty: 18 TABLET | Refills: 0 | Status: SHIPPED | OUTPATIENT
Start: 2020-07-06 | End: 2020-08-13 | Stop reason: ALTCHOICE

## 2020-07-06 RX ORDER — SULFAMETHOXAZOLE AND TRIMETHOPRIM 800; 160 MG/1; MG/1
1 TABLET ORAL 2 TIMES DAILY
Qty: 14 TABLET | Refills: 0 | Status: SHIPPED | OUTPATIENT
Start: 2020-07-06 | End: 2020-07-13

## 2020-07-06 ASSESSMENT — ENCOUNTER SYMPTOMS
RHINORRHEA: 0
SHORTNESS OF BREATH: 0
VOMITING: 0
SINUS PRESSURE: 0
COUGH: 1
NAUSEA: 0
SORE THROAT: 0
WHEEZING: 1

## 2020-07-06 NOTE — PROGRESS NOTES
Subjective:      Patient ID: Nani Scruggs. Emily Guerrero is a 49 y.o. female who presents today for:     Chief Complaint   Patient presents with    Follow-up       Dysuria    This is a new problem. The current episode started today. The problem has been unchanged. The quality of the pain is described as burning. There has been no fever. Associated symptoms include urgency. Pertinent negatives include no chills, discharge, flank pain, frequency, hematuria, hesitancy, nausea, possible pregnancy, sweats or vomiting. Associated symptoms comments: Dark, mal odorous. She has tried increased fluids for the symptoms. Pt has had continued to have cough and congestion since previous visit in April. She reports that it had improved, but then she started coughing a lot over the last 10 days. She coughs out thick tan/green or clear watery depending on what's going on. She feels wheezy at times. Intermittent sinus congestion. Had PND when in  [facility] Denies any fevers, chills, or ear pain. Has used inhaler intermittently with relief of wheezing. It typically causes her to cough up sputum. Pt's mother passed away in February and then her best friend passed away last week. The  Friday. Her lexapro was increased to 20mg. She has been doing okay on that dose. She continues to see therapist.     Intermittent abdominal pain for about 9 months. Happens about every 2 weeks or so. Unsure if it's related to food. Typically lower abdomen. Will notice some abdominal bloating usually the night before it starts and then will happen the next day. No other associated symptoms. Bentyl helps moderately well. Hasn't had appt with GI since colonoscopy.      Past Medical History:   Diagnosis Date    Anxiety     Bronchitis     Cancer ([facility] [address]     left breast    Chronic pain syndrome 10/6/2016    Colon polyps     Depression     suicide attempts    Duodenitis     Esophageal erosions     Family hx of colon cancer, Mother & Father 4/18/2017    Fibromyalgia     Foot deformity 10/6/2016    GERD (gastroesophageal reflux disease)     gastritis    Hypertension     Lumbar radiculopathy 5/10/2017    Osteoarthritis     TMJ arthritis 10/6/2016     Past Surgical History:   Procedure Laterality Date    BONE BIOPSY Right 09/14/2018    CT Guided Right Rib Fine Needle Aspiration by DR Dewey Emmanuel    BREAST SURGERY      breast bx/ lumpectomy    COLONOSCOPY      DILATION AND CURETTAGE OF UTERUS      ENDOSCOPY, COLON, DIAGNOSTIC      HYSTERECTOMY  2012    LAPAROSCOPY      LUMBAR DISCECTOMY      L4-L5    [place] COLORECTAL SCRN; HI RISK IND N/A 4/18/2018    [facility] [address] ESOPHAGOGASTRODUODENOSCOPY TRANSORAL DIAGNOSTIC N/A 5/11/2017    EGD ESOPHAGOGASTRODUODENOSCOPY performed by Lucy Bustillo MD at [address] [place] [address]. S.W      UPPER GASTROINTESTINAL ENDOSCOPY       Family History   Problem Relation Age of Onset    Cancer Mother         colon    High Blood Pressure Mother     Heart Disease Mother     Colon Cancer Mother     Cancer Father         colon    High Blood Pressure Father     Colon Cancer Father      Social History     Socioeconomic History    Marital status:      Spouse name: Not on file    Number of children: Not on file    Years of education: Not on file    Highest education level: Not on file   Occupational History    Not on file   Social Needs    Financial resource strain: Not on file    Food insecurity     Worry: Not on file     Inability: Not on file    Transportation needs     Medical: Not on file     Non-medical: Not on file   Tobacco Use    Smoking status: Current Every Day Smoker     Packs/day: 0.50     Types: Cigarettes    Smokeless tobacco: Never Used   Substance and Sexual Activity    Alcohol use:  Yes     Alcohol/week: 0.0 standard drinks     Comment: once in a while    Drug use: No    Sexual activity: Yes   Lifestyle    Physical activity     Days per week: Not on file     Minutes per session: Not on file    Stress: Not on file   Relationships    Social connections     Talks on phone: Not on file     Gets together: Not on file     Attends Zoroastrianism service: Not on file     Active member of club or organization: Not on file     Attends meetings of clubs or organizations: Not on file     Relationship status: Not on file    Intimate partner violence     Fear of current or ex partner: Not on file     Emotionally abused: Not on file     Physically abused: Not on file     Forced sexual activity: Not on file   Other Topics Concern    Not on file   Social History Narrative    Not on file     Current Outpatient Medications on File Prior to Visit   Medication Sig Dispense Refill    escitalopram (LEXAPRO) 10 MG tablet Take 20 mg by mouth daily       gabapentin (NEURONTIN) 600 MG tablet Take 2 tablets by mouth 2 times daily for 90 days. TAKE 2 TABLETS BY MOUTH 2  TIMES DAILY (TAKE 1200 MG  IN THE MORNING & 1200 MG IN THE EVENING) 360 tablet 3    varenicline (CHANTIX STARTING MONTH CLAIR) 0.5 MG X 11 & 1 MG X 42 tablet Take by mouth See Admin Instructions Take by mouth.       pantoprazole (PROTONIX) 40 MG tablet Take 1 tablet by mouth daily 30 tablet 5    lisinopril-hydrochlorothiazide (PRINZIDE;ZESTORETIC) 20-12.5 MG per tablet Take 1 tablet by mouth daily 30 tablet 5    diclofenac sodium 1 % GEL Apply 2 g topically 4 times daily as needed for Pain 1 Tube 5    buPROPion (WELLBUTRIN XL) 300 MG extended release tablet       azelastine (ASTELIN) 0.1 % nasal spray 1 spray by Nasal route 2 times daily Use in each nostril as directed (Patient not taking: Reported on 1/6/2020) 1 Bottle 3    albuterol sulfate HFA (PROAIR HFA) 108 (90 Base) MCG/ACT inhaler Inhale 2 puffs into the lungs every 6 hours as needed for Wheezing 1 Inhaler 3    fluticasone (FLONASE) 50 MCG/ACT nasal spray 1 spray by Nasal route daily       No current facility-administered medications on file prior to visit. Allergies:  Ultram [tramadol]; Norco [hydrocodone-acetaminophen]; and Percocet [oxycodone-acetaminophen]    Review of Systems   Constitutional: Positive for fatigue. Negative for chills and fever. HENT: Positive for congestion. Negative for ear pain, postnasal drip, rhinorrhea, sinus pressure and sore throat. Respiratory: Positive for cough and wheezing. Negative for shortness of breath. Cardiovascular: Negative for chest pain and palpitations. Gastrointestinal: Negative for nausea and vomiting. Genitourinary: Positive for dysuria and urgency. Negative for flank pain, frequency, hematuria, hesitancy and pelvic pain. Neurological: Negative for dizziness, syncope and headaches. Objective: There were no vitals taken for this visit. Physical Exam  Constitutional:       General: She is awake. She is not in acute distress. Appearance: Normal appearance. She is not ill-appearing or diaphoretic. HENT:      Head: Normocephalic. Right Ear: External ear normal.      Left Ear: External ear normal.      Nose: Nose normal.      Mouth/Throat:      Lips: Pink. Mouth: Mucous membranes are moist.   Pulmonary:      Effort: Pulmonary effort is normal. No respiratory distress. Skin:     Coloration: Skin is not ashen, cyanotic, jaundiced, mottled, pale or sallow. Neurological:      Mental Status: She is alert and oriented to person, place, and time. Psychiatric:         Mood and Affect: Mood is anxious and depressed. Speech: Speech normal.         Behavior: Behavior normal. Behavior is cooperative. Assessment:          Diagnosis Orders   1. Dyslipidemia  Lipid Panel   2. Myalgia  Vitamin D 25 Hydroxy    CBC Auto Differential    Comprehensive Metabolic Panel   3. UTI symptoms  sulfamethoxazole-trimethoprim (BACTRIM DS) 800-160 MG per tablet   4.  Lower abdominal pain  Food Allergy Profile    Celiac Reflex Panel    dicyclomine (BENTYL) 10 MG capsule    CBC Auto Differential   5. Bronchitis  sulfamethoxazole-trimethoprim (BACTRIM DS) 800-160 MG per tablet    predniSONE (DELTASONE) 10 MG tablet    Full PFT Study With Bronchodilator    XR CHEST STANDARD (2 VW)    CBC Auto Differential    Comprehensive Metabolic Panel   6. Cough  Full PFT Study With Bronchodilator    XR CHEST STANDARD (2 VW)   7. Breast cancer screening  JUDI DIGITAL SCREEN W CAD BILATERAL   8. Vitamin D deficiency  Vitamin D 25 Hydroxy   9. Cough variant asthma   Food Allergy Profile   10. Encounter for screening mammogram for malignant neoplasm of breast   JUDI DIGITAL SCREEN W CAD BILATERAL   11. Anxiety, Psychiatry only     12.  Depression, unspecified depression type         Plan:      Orders Placed This Encounter   Procedures    XR CHEST STANDARD (2 VW)     Standing Status:   Future     Standing Expiration Date:   7/6/2021    JUDI DIGITAL SCREEN W CAD BILATERAL     Standing Status:   Future     Standing Expiration Date:   9/5/2021    Lipid Panel     Standing Status:   Future     Standing Expiration Date:   7/6/2021     Order Specific Question:   Is Patient Fasting?/# of Hours     Answer:   yes 8 hours    Vitamin D 25 Hydroxy     Standing Status:   Future     Standing Expiration Date:   7/6/2021    Food Allergy Profile     Standing Status:   Future     Standing Expiration Date:   7/6/2021    Celiac Reflex Panel     Standing Status:   Future     Standing Expiration Date:   7/6/2021    CBC Auto Differential     Standing Status:   Future     Standing Expiration Date:   7/6/2021    Comprehensive Metabolic Panel     Standing Status:   Future     Standing Expiration Date:   7/6/2021    Full PFT Study With Bronchodilator     Standing Status:   Future     Standing Expiration Date:   7/6/2021          Orders Placed This Encounter   Medications    sulfamethoxazole-trimethoprim (BACTRIM DS) 800-160 MG per tablet     Sig: Take 1 tablet by mouth 2 times daily for 7 days     Dispense:  14 tablet     Refill:  0    predniSONE (DELTASONE) 10 MG tablet     Sig: Take 3 tab po daily x3 days, take 2 tab po daily x3 days, then take 1 tab po daily x3 days     Dispense:  18 tablet     Refill:  0    dicyclomine (BENTYL) 10 MG capsule     Sig: Take 1 capsule by mouth 4 times daily (before meals and nightly)     Dispense:  120 capsule     Refill:  0       Return in about 6 weeks (around 8/17/2020). Dyslipidemia- Diet changes. Check labs. UTI symptoms- discussed that given her symptoms I would like to start treatment for possible uti. She was agreeable. Increase fluids. F/u if not improving as expected. Abdominal pain- Okay to continue bentyl as she does notice some relief. Will add some allergy profile labs on to have checked. F/u with GI. Cough/Bronchitis- This has continued on for more than a month. Will need CXR. She did have improvement with previous treatment so will do another round of steroid and order PFT since she does notice some relief with albuterol inhaler. There is some concern for RAD and she may need a controller inhaler. Myalgias- Has hx of vitamin d deficiency so would like to check that along with a few other labs. Depression/anxiety- Continue regular follow up with psychiatry. Reviewed with the patient: current clinicalstatus, medications, activities and diet. Side effects, adverse effects of the medication prescribedtoday, as well as treatment plan/ rationale and result expectations have been discussedwith the patient who expresses understanding and desires to proceed. Close follow upto evaluate treatment results and for coordination of care. I have reviewedthe patient's medical history in detail and updated the computerized patient record. TELEHEALTH EVALUATION -- Audio/Visual (During MUVBU-21 public health emergency)    -   Brigitte Hu.  Kirti Nino is a 49 y.o. female being evaluated by a Virtual Visit (video visit) encounter to address concerns as mentioned above. A caregiver was present when appropriate. Due to this being a TeleHealth encounter (During ZIGEW-49 public health emergency), evaluation of the following organ systems was limited: Vitals/Constitutional/EENT/Resp/CV/GI//MS/Neuro/Skin/Heme-Lymph-Imm. Pursuant to the emergency declaration under the [address] [place] [address] and the [employer] Act, this Virtual Visit was conducted with patient's (and/or legal guardian's) consent, to reduce the patient's risk of exposure to COVID-19 and provide necessary medical care. The patient (and/or legal guardian) has also been advised to contact this office for worsening conditions or problems, and seek emergency medical treatment and/or call 911 if deemed necessary. Services were provided through a video synchronous discussion virtually to substitute for in-person clinic visit. Type of encounter was _X_ Doxy __ MyChart ___Facetime    Patient was located at their home. Provider was located at their __X_ home or        ____ office. --VERÓNICA Laughlin - CNP on 7/6/2020 at 1:55 PM    An electronic signature was used to authenticate this note.

## 2020-07-06 NOTE — PATIENT INSTRUCTIONS
meals using beans and peas. Add garbanzo or kidney beans to salads. Make burritos and tacos with mashed berger beans or black beans. Where can you learn more? Go to https://[email or web address] org and sign in to your [employer] account. Enter X104 in the [facility] box to learn more about \"DASH Diet: Care Instructions. \"     If you do not have an account, please click on the \"Sign Up Now\" link. Current as of: December 16, 2019               Content Version: 12.5  © 3481-2928 [employer]. Care instructions adapted under license by [facility] ([facility]). If you have questions about a medical condition or this instruction, always ask your healthcare professional. [address] any warranty or liability for your use of this information. Patient Education        Recovering From Depression: Care Instructions  Your Care Instructions     Taking good care of yourself is important as you recover from depression. In time, your symptoms will fade as your treatment takes hold. Do not give up. Instead, focus your energy on getting better. Your mood will improve. It just takes some time. Focus on things that can help you feel better, such as being with friends and family, eating well, and getting enough rest. But take things slowly. Do not do too much too soon. You will begin to feel better gradually. Follow-up care is a key part of your treatment and safety. Be sure to make and go to all appointments, and call your doctor if you are having problems. It's also a good idea to know your test results and keep a list of the medicines you take. How can you care for yourself at home? Be realistic  · If you have a large task to do, break it up into smaller steps you can handle, and just do what you can. · You may want to put off important decisions until your depression has lifted.  If you have plans that will have a major impact on your life, such as marriage, divorce, or a job change, try to wait a bit. Talk it over with friends and loved ones who can help you look at the overall picture first.  · Reaching out to people for help is important. Do not isolate yourself. Let your family and friends help you. Find someone you can trust and confide in, and talk to that person. · Be patient, and be kind to yourself. Remember that depression is not your fault and is not something you can overcome with willpower alone. Treatment is necessary for depression, just like for any other illness. Feeling better takes time, and your mood will improve little by little. Stay active  · Stay busy and get outside. Take a walk, or try some other light exercise. · Talk with your doctor about an exercise program. Exercise can help with mild depression. · Go to a movie or concert. Take part in a Sikhism activity or other social gathering. Go to a [employer] game. · Ask a friend to have dinner with you. Take care of yourself  · Eat a balanced diet with plenty of fresh fruits and vegetables, whole grains, and lean protein. If you have lost your appetite, eat small snacks rather than large meals. · Avoid drinking alcohol or using illegal drugs. Do not take medicines that have not been prescribed for you. They may interfere with medicines you may be taking for depression, or they may make your depression worse. · Take your medicines exactly as they are prescribed. You may start to feel better within 1 to 3 weeks of taking antidepressant medicine. But it can take as many as 6 to 8 weeks to see more improvement. If you have questions or concerns about your medicines, or if you do not notice any improvement by 3 weeks, talk to your doctor. · If you have any side effects from your medicine, tell your doctor. Antidepressants can make you feel tired, dizzy, or nervous. Some people have dry mouth, constipation, headaches, sexual problems, or diarrhea.  Many of these side effects are mild and will go away on their own after you have been taking the medicine for a few weeks. Some may last longer. Talk to your doctor if side effects are bothering you too much. You might be able to try a different medicine. · Get enough sleep. If you have problems sleeping:  ? Go to bed at the same time every night, and get up at the same time every morning. ? Keep your bedroom dark and quiet. ? Do not exercise after 5:00 p.m.  ? Avoid drinks with caffeine after 5:00 p.m. · Avoid sleeping pills unless they are prescribed by the doctor treating your depression. Sleeping pills may make you groggy during the day, and they may interact with other medicine you are taking. · If you have any other illnesses, such as diabetes, heart disease, or high blood pressure, make sure to continue with your treatment. Tell your doctor about all of the medicines you take, including those with or without a prescription. · Keep the numbers for these national suicide hotlines: 4-480-604-TALK (7-730.798.2080) and 4-961-GOXOASK (5-925.273.4602). If you or someone you know talks about suicide or feeling hopeless, get help right away. When should you call for help? [id number] anytime you think you may need emergency care. For example, call if:  · You feel like hurting yourself or someone else. · Someone you know has depression and is about to attempt or is attempting suicide. Call your doctor now or seek immediate medical care if:  · You hear voices. · Someone you know has depression and:  ? Starts to give away his or her possessions. ? Uses illegal drugs or drinks alcohol heavily. ? Talks or writes about death, including writing suicide notes or talking about guns, knives, or pills. ? Starts to spend a lot of time alone. ? Acts very aggressively or suddenly appears calm. Watch closely for changes in your health, and be sure to contact your doctor if:  · You do not get better as expected. Where can you learn more? Go to https://marquise.[employer]. org and sign in to your [employer] account. Enter T436 in the [employer] box to learn more about \"Recovering From Depression: Care Instructions. \"     If you do not have an account, please click on the \"Sign Up Now\" link. Current as of: January 31, 2020               Content Version: 12.5  © 2533-1660 [employer]. Care instructions adapted under license by [facility] ([facility]). If you have questions about a medical condition or this instruction, always ask your healthcare professional. [address] any warranty or liability for your use of this information.

## 2020-07-10 ENCOUNTER — HOSPITAL ENCOUNTER (OUTPATIENT)
Dept: GENERAL RADIOLOGY | Age: 55
Discharge: HOME | End: 2020-07-12
Payer: MEDICARE

## 2020-07-10 DIAGNOSIS — M79.10 MYALGIA: ICD-10-CM

## 2020-07-10 DIAGNOSIS — J45.991 COUGH VARIANT ASTHMA: ICD-10-CM

## 2020-07-10 DIAGNOSIS — E78.5 DYSLIPIDEMIA: ICD-10-CM

## 2020-07-10 DIAGNOSIS — E55.9 VITAMIN D DEFICIENCY: ICD-10-CM

## 2020-07-10 DIAGNOSIS — R10.30 LOWER ABDOMINAL PAIN: ICD-10-CM

## 2020-07-10 DIAGNOSIS — J40 BRONCHITIS: ICD-10-CM

## 2020-07-10 LAB
ALBUMIN SERPL-MCNC: 4.3 G/DL (ref 3.5–4.6)
ALP BLD-CCNC: 91 U/L (ref 40–130)
ALT SERPL-CCNC: 9 U/L (ref 0–33)
ANION GAP SERPL CALCULATED.3IONS-SCNC: 13 MEQ/L (ref 9–15)
AST SERPL-CCNC: 14 U/L (ref 0–35)
BASOPHILS ABSOLUTE: 0.1 K/UL (ref 0–0.2)
BASOPHILS RELATIVE PERCENT: 0.6 %
BILIRUB SERPL-MCNC: <0.2 MG/DL (ref 0.2–0.7)
BUN BLDV-MCNC: 11 MG/DL (ref 6–20)
CALCIUM SERPL-MCNC: 9.9 MG/DL (ref 8.5–9.9)
CHLORIDE BLD-SCNC: 103 MEQ/L (ref 95–107)
CHOLESTEROL, TOTAL: 273 MG/DL (ref 0–199)
CO2: 25 MEQ/L (ref 20–31)
CREAT SERPL-MCNC: 0.64 MG/DL (ref 0.5–0.9)
EOSINOPHILS ABSOLUTE: 0 K/UL (ref 0–0.7)
EOSINOPHILS RELATIVE PERCENT: 0.2 %
GFR AFRICAN AMERICAN: >60
GFR NON-AFRICAN AMERICAN: >60
GLOBULIN: 2.8 G/DL (ref 2.3–3.5)
GLUCOSE BLD-MCNC: 122 MG/DL (ref 70–99)
HCT VFR BLD CALC: 47.6 % (ref 37–47)
HDLC SERPL-MCNC: 81 MG/DL (ref 40–59)
HEMOGLOBIN: 15.7 G/DL (ref 12–16)
LDL CHOLESTEROL CALCULATED: 170 MG/DL (ref 0–129)
LYMPHOCYTES ABSOLUTE: 1.3 K/UL (ref 1–4.8)
LYMPHOCYTES RELATIVE PERCENT: 15.9 %
MCH RBC QN AUTO: 30.4 PG (ref 27–31.3)
MCHC RBC AUTO-ENTMCNC: 33 % (ref 33–37)
MCV RBC AUTO: 92.3 FL (ref 82–100)
MONOCYTES ABSOLUTE: 0.2 K/UL (ref 0.2–0.8)
MONOCYTES RELATIVE PERCENT: 2.5 %
NEUTROPHILS ABSOLUTE: 6.7 K/UL (ref 1.4–6.5)
NEUTROPHILS RELATIVE PERCENT: 80.8 %
PDW BLD-RTO: 15 % (ref 11.5–14.5)
PLATELET # BLD: 304 K/UL (ref 130–400)
POTASSIUM SERPL-SCNC: 3.6 MEQ/L (ref 3.4–4.9)
RBC # BLD: 5.15 M/UL (ref 4.2–5.4)
SODIUM BLD-SCNC: 141 MEQ/L (ref 135–144)
TOTAL PROTEIN: 7.1 G/DL (ref 6.3–8)
TRIGL SERPL-MCNC: 111 MG/DL (ref 0–150)
VITAMIN D 25-HYDROXY: 26.3 NG/ML (ref 30–100)
WBC # BLD: 8.3 K/UL (ref 4.8–10.8)

## 2020-07-10 PROCEDURE — 71046 X-RAY EXAM CHEST 2 VIEWS: CPT

## 2020-07-12 LAB — IGA: 76 MG/DL (ref 68–408)

## 2020-07-13 LAB — TISSUE TRANSGLUTAMINASE IGA: <2 U/ML (ref 0–3)

## 2020-07-16 LAB
ALLERGEN BARLEY IGE: <0.1 KU/L
ALLERGEN BEEF: <0.1 KU/L
ALLERGEN CABBAGE IGE: <0.1 KU/L
ALLERGEN CARROT IGE: <0.1 KU/L
ALLERGEN CHICKEN IGE: <0.1 KU/L
ALLERGEN CODFISH IGE: <0.1 KU/L
ALLERGEN CORN IGE: <0.1 KU/L
ALLERGEN COW MILK IGE: <0.1 KU/L
ALLERGEN CRAB IGE: <0.1 KU/L
ALLERGEN EGG WHITE IGE: <0.1 KU/L
ALLERGEN GRAPE IGE: <0.1 KU/L
ALLERGEN LETTUCE IGE: <0.1 KU/L
ALLERGEN NAVY BEAN: <0.1 KU/L
ALLERGEN OAT: <0.1 KU/L
ALLERGEN ORANGE IGE: <0.1 KU/L
ALLERGEN PEANUT (F13) IGE: <0.1 KU/L
ALLERGEN PORK: <0.1 KU/L
ALLERGEN POTATO IGE: <0.1 KU/L
ALLERGEN RICE IGE: <0.1 KU/L
ALLERGEN RYE IGE: <0.1 KU/L
ALLERGEN SEE NOTE: NORMAL
ALLERGEN SHRIMP IGE: <0.1 KU/L
ALLERGEN SOYBEAN IGE: <0.1 KU/L
ALLERGEN TOMATO IGE: <0.1 KU/L
ALLERGEN TUNA IGE: <0.1 KU/L
ALLERGEN WHEAT IGE: <0.1 KU/L
ALLERGEN, BELL PEPPER: <0.1 KU/L

## 2020-07-30 ENCOUNTER — NURSE ONLY (OUTPATIENT)
Dept: PRIMARY CARE CLINIC | Age: 55
End: 2020-07-30

## 2020-07-30 ENCOUNTER — HOSPITAL ENCOUNTER (OUTPATIENT)
Age: 55
Setting detail: SPECIMEN
Discharge: HOME | End: 2020-07-30
Payer: MEDICARE

## 2020-07-30 PROCEDURE — U0003 INFECTIOUS AGENT DETECTION BY NUCLEIC ACID (DNA OR RNA); SEVERE ACUTE RESPIRATORY SYNDROME CORONAVIRUS 2 (SARS-COV-2) (CORONAVIRUS DISEASE [COVID-19]), AMPLIFIED PROBE TECHNIQUE, MAKING USE OF HIGH THROUGHPUT TECHNOLOGIES AS DESCRIBED BY CMS-2020-01-R: HCPCS

## 2020-08-01 LAB
SARS-COV-2: NOT DETECTED
SOURCE: NORMAL

## 2020-08-06 ENCOUNTER — HOSPITAL ENCOUNTER (OUTPATIENT)
Dept: ULTRASOUND IMAGING | Age: 55
Discharge: HOME | End: 2020-08-08
Payer: MEDICARE

## 2020-08-06 ENCOUNTER — HOSPITAL ENCOUNTER (OUTPATIENT)
Dept: PULMONOLOGY | Age: 55
Discharge: HOME | End: 2020-08-06
Payer: MEDICARE

## 2020-08-06 ENCOUNTER — HOSPITAL ENCOUNTER (OUTPATIENT)
Dept: WOMENS IMAGING | Age: 55
Discharge: HOME | End: 2020-08-08
Payer: MEDICARE

## 2020-08-06 PROCEDURE — 94729 DIFFUSING CAPACITY: CPT

## 2020-08-06 PROCEDURE — G0279 TOMOSYNTHESIS, MAMMO: HCPCS

## 2020-08-06 PROCEDURE — 94726 PLETHYSMOGRAPHY LUNG VOLUMES: CPT

## 2020-08-06 PROCEDURE — 94060 EVALUATION OF WHEEZING: CPT

## 2020-08-06 PROCEDURE — 6360000002 HC RX W HCPCS: Performed by: NURSE PRACTITIONER

## 2020-08-06 PROCEDURE — 94726 PLETHYSMOGRAPHY LUNG VOLUMES: CPT | Performed by: INTERNAL MEDICINE

## 2020-08-06 PROCEDURE — 94729 DIFFUSING CAPACITY: CPT | Performed by: INTERNAL MEDICINE

## 2020-08-06 PROCEDURE — 76642 ULTRASOUND BREAST LIMITED: CPT

## 2020-08-06 PROCEDURE — 94060 EVALUATION OF WHEEZING: CPT | Performed by: INTERNAL MEDICINE

## 2020-08-06 RX ORDER — ALBUTEROL SULFATE 2.5 MG/3ML
2.5 SOLUTION RESPIRATORY (INHALATION) ONCE
Status: COMPLETED | OUTPATIENT
Start: 2020-08-06 | End: 2020-08-06

## 2020-08-06 RX ADMIN — ALBUTEROL SULFATE 2.5 MG: 2.5 SOLUTION RESPIRATORY (INHALATION) at 12:43

## 2020-08-13 ENCOUNTER — OFFICE VISIT (OUTPATIENT)
Dept: FAMILY MEDICINE CLINIC | Age: 55
End: 2020-08-13
Payer: MEDICARE

## 2020-08-13 VITALS
OXYGEN SATURATION: 98 % | SYSTOLIC BLOOD PRESSURE: 106 MMHG | WEIGHT: 140.2 LBS | DIASTOLIC BLOOD PRESSURE: 69 MMHG | TEMPERATURE: 97 F | HEIGHT: 63 IN | HEART RATE: 75 BPM | BODY MASS INDEX: 24.84 KG/M2

## 2020-08-13 DIAGNOSIS — E78.5 DYSLIPIDEMIA: ICD-10-CM

## 2020-08-13 DIAGNOSIS — N64.89 OTHER SPECIFIED DISORDERS OF BREAST: ICD-10-CM

## 2020-08-13 DIAGNOSIS — R59.1 LYMPHADENOPATHY: ICD-10-CM

## 2020-08-13 DIAGNOSIS — R73.9 HYPERGLYCEMIA: ICD-10-CM
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
## 2020-08-13 LAB
ALBUMIN SERPL-MCNC: 4.3 G/DL (ref 3.5–4.6)
ALP BLD-CCNC: 85 U/L (ref 40–130)
ALT SERPL-CCNC: 8 U/L (ref 0–33)
AST SERPL-CCNC: 17 U/L (ref 0–35)
BILIRUB SERPL-MCNC: 0.4 MG/DL (ref 0.2–0.7)
BILIRUBIN DIRECT: <0.2 MG/DL (ref 0–0.4)
BILIRUBIN, INDIRECT: NORMAL MG/DL (ref 0–0.6)
CHOLESTEROL, TOTAL: 316 MG/DL (ref 0–199)
HBA1C MFR BLD: 5.7 % (ref 4.8–5.9)
HDLC SERPL-MCNC: 86 MG/DL (ref 40–59)
LDL CHOLESTEROL CALCULATED: 212 MG/DL (ref 0–129)
TOTAL PROTEIN: 7.1 G/DL (ref 6.3–8)
TRIGL SERPL-MCNC: 90 MG/DL (ref 0–150)

## 2020-08-13 PROCEDURE — 3023F SPIROM DOC REV: CPT | Performed by: NURSE PRACTITIONER

## 2020-08-13 PROCEDURE — 3017F COLORECTAL CA SCREEN DOC REV: CPT | Performed by: NURSE PRACTITIONER

## 2020-08-13 PROCEDURE — G8926 SPIRO NO PERF OR DOC: HCPCS | Performed by: NURSE PRACTITIONER

## 2020-08-13 PROCEDURE — G8420 CALC BMI NORM PARAMETERS: HCPCS | Performed by: NURSE PRACTITIONER

## 2020-08-13 PROCEDURE — 99214 OFFICE O/P EST MOD 30 MIN: CPT | Performed by: NURSE PRACTITIONER

## 2020-08-13 PROCEDURE — 4004F PT TOBACCO SCREEN RCVD TLK: CPT | Performed by: NURSE PRACTITIONER

## 2020-08-13 PROCEDURE — G8427 DOCREV CUR MEDS BY ELIG CLIN: HCPCS | Performed by: NURSE PRACTITIONER

## 2020-08-13 RX ORDER — LISINOPRIL AND HYDROCHLOROTHIAZIDE 20; 12.5 MG/1; MG/1
1 TABLET ORAL DAILY
Qty: 30 TABLET | Refills: 5 | Status: SHIPPED | OUTPATIENT
Start: 2020-08-13 | End: 2021-01-12

## 2020-08-13 RX ORDER — GABAPENTIN 600 MG/1
1200 TABLET ORAL 2 TIMES DAILY
Qty: 360 TABLET | Refills: 3 | Status: SHIPPED | OUTPATIENT
Start: 2020-08-13 | End: 2021-07-26

## 2020-08-13 RX ORDER — PANTOPRAZOLE SODIUM 40 MG/1
40 TABLET, DELAYED RELEASE ORAL DAILY
Qty: 30 TABLET | Refills: 5 | Status: SHIPPED | OUTPATIENT
Start: 2020-08-13 | End: 2021-01-12

## 2020-08-13 RX ORDER — BUDESONIDE AND FORMOTEROL FUMARATE DIHYDRATE 160; 4.5 UG/1; UG/1
2 AEROSOL RESPIRATORY (INHALATION) 2 TIMES DAILY
Qty: 1 INHALER | Refills: 3 | Status: SHIPPED | OUTPATIENT
Start: 2020-08-13 | End: 2020-10-15

## 2020-08-13 RX ORDER — ATORVASTATIN CALCIUM 10 MG/1
10 TABLET, FILM COATED ORAL DAILY
Qty: 30 TABLET | Refills: 3 | Status: SHIPPED | OUTPATIENT
Start: 2020-08-13 | End: 2020-10-15

## 2020-08-13 RX ORDER — BUDESONIDE AND FORMOTEROL FUMARATE DIHYDRATE 160; 4.5 UG/1; UG/1
2 AEROSOL RESPIRATORY (INHALATION) 2 TIMES DAILY
Qty: 1 INHALER | Refills: 0 | COMMUNITY
Start: 2020-08-13 | End: 2020-10-01

## 2020-08-13 NOTE — PROGRESS NOTES
Subjective:      Patient ID: Luis Zimmerman. Eryn Fan is a 49 y.o. female who presents today for:     Chief Complaint   Patient presents with   [address] [employer] [address]     Patient presents today to discuss lab results. HPI Pt in today to f/u on labs and recent testing. Ultrasound showed some lymph nodes, but mammogram was negative for any abnormalities. She is  in right axilla. Does admit to frequent cat scratches from her cats. Nothing specific concerning for infection from what she can tell.    + PFT for obstructive   Has been smoking since about 8th grade. She did stop for about 10 years in there and started about 2 years ago again. She has smoked marijuana the entire time. Has continued with chronic cough and intermittent sob and wheezing. Pt needs refill on gabapentin. She has been getting ablation and isn't sure if there is anymore she can get so she would like to just continue f/u with PCP for gabapentin and is agreeable to only getting from one provider. Dyslipidemia, elevated BG- pt was on prednisone when she had blood work done. No concerns for Dm2. Is agreeable to starting statin for cholesterol as it has been a long standing issue which is worsening over time and something always seems to come up which causes her to not be able to change her diet like she would like.     Past Medical History:   Diagnosis Date    Anxiety     Bronchitis     Cancer ([address]     left breast    Chronic pain syndrome 10/6/2016    Colon polyps     Depression     suicide attempts    Duodenitis     Esophageal erosions     Family hx of colon cancer, Mother & Father 4/18/2017    Fibromyalgia     Foot deformity 10/6/2016    GERD (gastroesophageal reflux disease)     gastritis    Hypertension     Lumbar radiculopathy 5/10/2017    Osteoarthritis     TMJ arthritis 10/6/2016     Past Surgical History:   Procedure Laterality Date    BONE BIOPSY Right 09/14/2018    CT Guided Right Rib Fine Needle Aspiration by  Sandrine Weeks Shaker    BREAST SURGERY      breast bx/ lumpectomy    COLONOSCOPY      DILATION AND CURETTAGE OF UTERUS      ENDOSCOPY, COLON, DIAGNOSTIC      HYSTERECTOMY  2012    LAPAROSCOPY      LUMBAR DISCECTOMY      L4-L5    AR COLORECTAL SCRN; HI RISK IND N/A 4/18/2018    COLONOSCOPY performed by Lexie Eng MD at [address] ESOPHAGOGASTRODUODENOSCOPY TRANSORAL DIAGNOSTIC N/A 5/11/2017    EGD ESOPHAGOGASTRODUODENOSCOPY performed by Lexie Eng MD at [address] [place] [address]. S.W      UPPER GASTROINTESTINAL ENDOSCOPY       Family History   Problem Relation Age of Onset    Cancer Mother         colon    High Blood Pressure Mother     Heart Disease Mother     Colon Cancer Mother     Cancer Father         colon    High Blood Pressure Father     Colon Cancer Father      Social History     Socioeconomic History    Marital status:      Spouse name: Not on file    Number of children: Not on file    Years of education: Not on file    Highest education level: Not on file   Occupational History    Not on file   Social Needs    Financial resource strain: Not on file    Food insecurity     Worry: Not on file     Inability: Not on file    Transportation needs     Medical: Not on file     Non-medical: Not on file   Tobacco Use    Smoking status: Current Every Day Smoker     Packs/day: 0.50     Types: Cigarettes    Smokeless tobacco: Never Used   Substance and Sexual Activity    Alcohol use:  Yes     Alcohol/week: 0.0 standard drinks     Comment: once in a while    Drug use: No    Sexual activity: Yes   Lifestyle    Physical activity     Days per week: Not on file     Minutes per session: Not on file    Stress: Not on file   Relationships    Social connections     Talks on phone: Not on file     Gets together: Not on file     Attends Temple service: Not on file     Active member of club or organization: Not on file for arthralgias, back pain, gait problem and myalgias. Negative for joint swelling, neck pain and neck stiffness. Skin: Negative for color change, pallor, rash and wound. Neurological: Negative for dizziness, syncope and headaches. Hematological: Positive for adenopathy. Does not bruise/bleed easily. Psychiatric/Behavioral: Negative for self-injury and suicidal ideas. Objective:   /69 (Site: Left Upper Arm, Position: Sitting, Cuff Size: Medium Adult)   Pulse 75   Temp 97 °F (36.1 °C) (Temporal)   Ht 5' 3\" (1.6 m)   Wt 140 lb 3.2 oz (63.6 kg)   SpO2 98%   Breastfeeding No   BMI 24.84 kg/m²     Physical Exam  Constitutional:       Appearance: She is well-developed. HENT:      Head: Normocephalic. Right Ear: Tympanic membrane, ear canal and external ear normal.      Left Ear: Tympanic membrane, ear canal and external ear normal.      Nose: Nose normal.      Mouth/Throat:      Mouth: Mucous membranes are moist.      Pharynx: Oropharynx is clear. Uvula midline. Eyes:      General:         Right eye: No discharge. Left eye: No discharge. Conjunctiva/sclera: Conjunctivae normal.   Neck:      Musculoskeletal: Normal range of motion. Cardiovascular:      Rate and Rhythm: Normal rate and regular rhythm. Heart sounds: Normal heart sounds. Pulmonary:      Effort: Pulmonary effort is normal. No respiratory distress. Breath sounds: Rhonchi (upper lobes) present. No decreased breath sounds, wheezing or rales. Chest:       Lymphadenopathy:      Head:      Right side of head: No submental, submandibular, tonsillar, preauricular, posterior auricular or occipital adenopathy. Left side of head: No submental, submandibular, tonsillar, preauricular, posterior auricular or occipital adenopathy. Cervical: No cervical adenopathy. Upper Body:      Right upper body: Axillary adenopathy present. No supraclavicular adenopathy.       Left upper body: No supraclavicular or axillary adenopathy. Skin:     General: Skin is warm and dry. Neurological:      Mental Status: She is alert and oriented to person, place, and time. Psychiatric:         Mood and Affect: Mood normal.         Behavior: Behavior normal.         Assessment:          Diagnosis Orders   1. Lymphadenopathy  US Soft Tissue Limited Area    Bartonella Henselae (Cat Scratch) Antibodies IgG & IgM By [facility]   2. Hyperglycemia  Hemoglobin A1C   3. Lumbosacral spondylosis without myelopathy  gabapentin (NEURONTIN) 600 MG tablet   4. Postlaminectomy syndrome, lumbar  gabapentin (NEURONTIN) 600 MG tablet   5. Dyslipidemia  atorvastatin (LIPITOR) 10 MG tablet    Hepatic Function Panel    Lipid Panel    Lipid Panel    Hepatic Function Panel   6. Generalized abdominal pain  Clair Rehman MD, Gastroenterology, [address] [place] [address]   7. Loose stools  [facility]   8. Essential hypertension  lisinopril-hydroCHLOROthiazide (PRINZIDE;ZESTORETIC) 20-12.5 MG per tablet   9. Encounter for screening for lung cancer  CT lung screen [Initial/Annual]   10. Acute gastritis, presence of bleeding unspecified, unspecified gastritis type  pantoprazole (PROTONIX) 40 MG tablet   11. Cat scratch  Bartonella Henselae (Cat Scratch) Antibodies IgG & IgM By Ifa   12. Chronic obstructive pulmonary disease, unspecified COPD type (HCC)  budesonide-formoterol (SYMBICORT) 160-4.5 MCG/ACT AERO    budesonide-formoterol (SYMBICORT) 160-4.5 MCG/ACT AERO   13. Other specified disorders of breast   Bartonella Henselae (Cat Scratch) Antibodies IgG & IgM By Ifa   14.  Tobacco abuse  CT lung screen [Initial/Annual]       Plan:      Orders Placed This Encounter   Procedures    US Soft Tissue Limited Area     Standing Status:   Future     Standing Expiration Date:   8/13/2021     Order Specific Question:   Reason for exam:     Answer:   right axillary lymphadenopathy, tender    CT lung screen [Initial/Annual]     Age: 49 y.o.   Smoking History:   Social History    Tobacco Use      Smoking status: Current Every Day Smoker        Packs/day: 0.50        Types: Cigarettes      Smokeless tobacco: Never Used    Alcohol use: Yes      Alcohol/week: 0.0 standard drinks      Comment: once in a while    Drug use: No    Pack years: 0  Last CT lung screen: [unfilled]     Standing Status:   Future     Standing Expiration Date:   8/13/2021     Order Specific Question:   Is there documentation of shared decision making? Answer:   Yes     Order Specific Question:   Does the patient show any signs or symptoms of lung cancer? Answer:   No     Order Specific Question:   Is this the first (baseline) CT or an annual exam?     Answer:   Baseline [1]     Order Specific Question:   Is this a low dose CT or a routine CT? Answer:   Low Dose CT [1]     Order Specific Question:   Smoking Status? Answer:   Current Every Day Smoker [1]     Order Specific Question:   Smoking packs per day? Answer:   0.5     Order Specific Question:   Years smoking?      Answer:   36    Hepatic Function Panel     Standing Status:   Future     Number of Occurrences:   1     Standing Expiration Date:   8/13/2021    Lipid Panel     Standing Status:   Future     Number of Occurrences:   1     Standing Expiration Date:   8/13/2021     Order Specific Question:   Is Patient Fasting?/# of Hours     Answer:   yes 8 hours    Bartonella Henselae (Cat Scratch) Antibodies IgG & IgM By Ifa     Standing Status:   Future     Number of Occurrences:   1     Standing Expiration Date:   8/13/2021    Hemoglobin A1C     Standing Status:   Future     Number of Occurrences:   1     Standing Expiration Date:   8/13/2021    Lipid Panel     Standing Status:   Future     Standing Expiration Date:   8/14/2021     Order Specific Question:   Is Patient Fasting?/# of Hours     Answer:   yes 8 hours    Hepatic Function Panel     Standing Status:   Future     Standing Expiration Date: 8/14/2021   Khalif Villagran MD, Gastroenterology, [facility]     Referral Priority:   Routine     Referral Type:   Eval and Treat     Referral Reason:   Specialty Services Required     Referred to Provider:   Brittani Del Rosario MD     Requested Specialty:   Gastroenterology     Number of Visits Requested:   1          Orders Placed This Encounter   Medications    atorvastatin (LIPITOR) 10 MG tablet     Sig: Take 1 tablet by mouth daily     Dispense:  30 tablet     Refill:  3    gabapentin (NEURONTIN) 600 MG tablet     Sig: Take 2 tablets by mouth 2 times daily for 90 days. TAKE 2 TABLETS BY MOUTH 2  TIMES DAILY (TAKE 1200 MG  IN THE MORNING & 1200 MG IN THE EVENING)     Dispense:  360 tablet     Refill:  3    lisinopril-hydroCHLOROthiazide (PRINZIDE;ZESTORETIC) 20-12.5 MG per tablet     Sig: Take 1 tablet by mouth daily     Dispense:  30 tablet     Refill:  5    budesonide-formoterol (SYMBICORT) 160-4.5 MCG/ACT AERO     Sig: Inhale 2 puffs into the lungs 2 times daily     Dispense:  1 Inhaler     Refill:  3    budesonide-formoterol (SYMBICORT) 160-4.5 MCG/ACT AERO     Sig: Inhale 2 puffs into the lungs 2 times daily [id number] EXP 06/21     Dispense:  1 Inhaler     Refill:  0    pantoprazole (PROTONIX) 40 MG tablet     Sig: Take 1 tablet by mouth daily     Dispense:  30 tablet     Refill:  5       Return in about 1 month (around 9/13/2020). COPD- start symbicort. Albuterol prn. Cough- Encouraged CT of chest due to long standing history of smoking. She has about a 20 pack year from cigarettes but at least 30 pack year if marijuana is included. Cough may be related to COPD, but given history would like Ct lung screen. Pt was agreeable. Lymphadenopathy- repeat ultrasound in a month. Check labs for possible cat scratch fever. Likely incidental finding. Dyslipidemia- start lipitor. Get labs in 6 weeks. F/u after labs. Other conditions chronic and stable. Continue current treatment plan.  Will update with results of blood work and any changes if needed. Medication agreement was signed for gabapentin and pt verbalized understanding of not getting from multiple providers. Reviewed with the patient: current clinicalstatus, medications, activities and diet. Side effects, adverse effects of the medication prescribedtoday, as well as treatment plan/ rationale and result expectations have been discussedwith the patient who expresses understanding and desires to proceed. Close follow upto evaluate treatment results and for coordination of care. I have reviewedthe patient's medical history in detail and updated the computerized patient record.     Debora Gaines, APRN - CNP

## 2020-08-13 NOTE — PATIENT INSTRUCTIONS
Patient Education        Chronic Obstructive Pulmonary Disease (COPD): Care Instructions  Your Care Instructions     Chronic obstructive pulmonary disease (COPD) is a general term for a group of lung diseases, including emphysema and chronic bronchitis. People with COPD have decreased airflow in and out of the lungs, which makes it hard to breathe. The airways also can get clogged with thick mucus. Cigarette smoking is a major cause of COPD. Although there is no cure for COPD, you can slow its progress. Following your treatment plan and taking care of yourself can help you feel better and live longer. Follow-up care is a key part of your treatment and safety. Be sure to make and go to all appointments, and call your doctor if you are having problems. It's also a good idea to know your test results and keep a list of the medicines you take. How can you care for yourself at home? Staying healthy  · Do not smoke. This is the most important step you can take to prevent more damage to your lungs. If you need help quitting, talk to your doctor about stop-smoking programs and medicines. These can increase your chances of quitting for good. · Avoid colds and flu. Get a pneumococcal vaccine shot. If you have had one before, ask your doctor whether you need a second dose. Get the flu vaccine every fall. If you must be around people with colds or the flu, wash your hands often. · Avoid secondhand smoke, air pollution, and high altitudes. Also avoid cold, dry air and hot, humid air. Stay at home with your windows closed when air pollution is bad. Medicines and oxygen therapy  · Take your medicines exactly as prescribed. Call your doctor if you think you are having a problem with your medicine. You may be taking medicines such as:  ? Bronchodilators. These help open your airways and make breathing easier. They are either short-acting (work for 6 to 9 hours) or long-acting (work for 24 hours).  You inhale most bronchodilators, so they start to act quickly. Always carry your quick-relief inhaler with you in case you need it while you are away from home. ? Corticosteroids (prednisone, budesonide). These reduce airway inflammation. They come in pill or inhaled form. You must take these medicines every day for them to work well. · Ask your doctor or pharmacist if a spacer is right for you. A spacer may help you get more inhaled medicine to your lungs. If you use one, ask how to use it properly. · Do not take any vitamins, over-the-counter medicine, or herbal products without talking to your doctor first.  · If your doctor prescribed antibiotics, take them as directed. Do not stop taking them just because you feel better. You need to take the full course of antibiotics. · If you use oxygen therapy, use the flow rate your doctor has recommended. Don't change it without talking to your doctor first. Oxygen therapy boosts the amount of oxygen in your blood and helps you breathe easier. Activity  · Get regular exercise. Walking is an easy way to get exercise. Start out slowly, and walk a little more each day. · Pay attention to your breathing. You are exercising too hard if you can't talk while you exercise. · Take short rest breaks when doing household chores and other activities. · Learn breathing methods--such as breathing through pursed lips--to help you become less short of breath. · If your doctor has not set you up with a pulmonary rehabilitation program, ask if rehab is right for you. Rehab includes exercise programs, education about your disease and how to manage it, help with diet and other changes, and emotional support. Diet  · Eat regular, healthy meals. Use bronchodilators about 1 hour before you eat to make it easier to eat. Eat several small meals instead of three large ones. Drink beverages at the end of the meal. Avoid foods that are hard to chew.   · Eat foods that contain protein so you don't lose muscle mass. · Talk with your doctor if you gain too much weight or if you lose weight without trying. Mental health  · Talk to your family, friends, or a therapist about your feelings. Some people feel frightened, angry, hopeless, helpless, and even guilty. Talking openly about bad feelings can help you cope. If these feelings last, talk to your doctor. When should you call for help? [id number] anytime you think you may need emergency care. For example, call if:  · You have severe trouble breathing. Call your doctor now or seek immediate medical care if:  · You have new or worse trouble breathing. · You cough up blood. · You have a fever. Watch closely for changes in your health, and be sure to contact your doctor if:  · You cough more deeply or more often, especially if you notice more mucus or a change in the color of your mucus. · You have new or worse swelling in your legs or belly. · You are not getting better as expected. Where can you learn more? Go to https://[employer]. org and sign in to your [employer] account. Enter V773 in the [employer] box to learn more about \"Chronic Obstructive Pulmonary Disease (COPD): Care Instructions. \"     If you do not have an account, please click on the \"Sign Up Now\" link. Current as of: February 24, 2020               Content Version: 12.5  © 8449-9488 [employer]. Care instructions adapted under license by [facility] ([facility]). If you have questions about a medical condition or this instruction, always ask your healthcare professional. [place] [address] any warranty or liability for your use of this information.

## 2020-08-14 ASSESSMENT — ENCOUNTER SYMPTOMS
SHORTNESS OF BREATH: 1
ABDOMINAL PAIN: 0
RHINORRHEA: 0
COUGH: 1
WHEEZING: 1
SINUS PRESSURE: 0
COLOR CHANGE: 0
BACK PAIN: 1
SORE THROAT: 0

## 2020-08-15 LAB
BARTONELLA HENSELAE AB, IGG: NORMAL
BARTONELLA HENSELAE AB, IGM: NORMAL

## 2020-08-17 ENCOUNTER — TELEPHONE (OUTPATIENT)
Dept: FAMILY MEDICINE CLINIC | Age: 55
End: 2020-08-17

## 2020-08-18 ENCOUNTER — VIRTUAL VISIT (OUTPATIENT)
Dept: GASTROENTEROLOGY | Age: 55
End: 2020-08-18
Payer: MEDICARE

## 2020-08-18 PROCEDURE — G8427 DOCREV CUR MEDS BY ELIG CLIN: HCPCS | Performed by: INTERNAL MEDICINE

## 2020-08-18 PROCEDURE — 99214 OFFICE O/P EST MOD 30 MIN: CPT | Performed by: INTERNAL MEDICINE

## 2020-08-18 PROCEDURE — G8420 CALC BMI NORM PARAMETERS: HCPCS | Performed by: INTERNAL MEDICINE

## 2020-08-18 PROCEDURE — 4004F PT TOBACCO SCREEN RCVD TLK: CPT | Performed by: INTERNAL MEDICINE

## 2020-08-18 PROCEDURE — 3017F COLORECTAL CA SCREEN DOC REV: CPT | Performed by: INTERNAL MEDICINE

## 2020-08-18 RX ORDER — NORTRIPTYLINE HYDROCHLORIDE 10 MG/1
10 CAPSULE ORAL NIGHTLY
Qty: 30 CAPSULE | Refills: 3 | Status: SHIPPED | OUTPATIENT
Start: 2020-08-18 | End: 2020-10-19 | Stop reason: SDUPTHER

## 2020-08-18 RX ORDER — DICYCLOMINE HYDROCHLORIDE 10 MG/1
10 CAPSULE ORAL
Qty: 120 CAPSULE | Refills: 3 | Status: SHIPPED | OUTPATIENT
Start: 2020-08-18 | End: 2020-10-19 | Stop reason: SDUPTHER

## 2020-08-18 NOTE — PROGRESS NOTES
2020    TELEHEALTH EVALUATION -- Audio/Visual (During SSPPF-96 public health emergency)    Due to Sonya 19 outbreak, patient's office visit was converted to a virtual visit. Patient was contacted and agreed to proceed with a virtual visit via Telephone Visit  The risks and benefits of converting to a virtual visit were discussed in light of the current infectious disease epidemic. Patient also understood that insurance coverage and co-pays are up to their individual insurance plans. Chief Complaint   Patient presents with   Shant Cristina Consultation     . starts out with reg bm but towards the end she gets sweats, pain and stool gets loose and loose to where she leaks.  Abdominal Pain     abdominal pain lower abdominal. colon last year at [facility]. HPI:  Patient Location: Home  Provider location: Office    Rosana Costa (:  1965) has requested an audio/video evaluation for symptoms of abdominal pain/discomfort and issues with bowel movements. Patient with longstanding GI symptoms. Moved to [place] 5 years ago from [place]. 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 Underwent a colonoscopy with Dr. Jacob Álvarez in 2018 due to a very strong family history of colon cancer both in mother and father. Reports having first: At age 41 at which time polyps were found, no polyps found at last colonoscopy in 2018. Patient reports having had a episode of gastroenteritis followed by some rectal bleeding. Patient had evaluation at [facility] [place] with EGD and colonoscopy with Dr. Maribel Conley. On both  and  colonoscopy no diverticulosis or hemorrhoids were noted. Rectal bleeding resolved within 3 days. Patient not sure regarding the diagnosis and the etiology for the rectal bleeding.     At this time reports having intermittent episodes of difficulty with bowel movements, which involves having loose liquid bowel movements (diarrhea), multiple bowel movements until she is completely cleaned out and exhausted, this is then followed by abdominal discomfort, sweating and fatigue. Patient has tried Bentyl with moderately good response, however takes it only 1 or 2 times when symptomatic. She additionally has history of anxiety, depression and also reports significant issues with sleep. Unable to take trazodone due to significant sedation from the medication. Previous GI work up/Endoscopic investigations:   Colonoscopy: 2018: Normal  Colonoscopy: 2019: Normal according to patient (Request old records from [facility])    Review of Systems   All other systems reviewed and are negative. Prior to Visit Medications    Medication Sig Taking? Authorizing Provider   nortriptyline (PAMELOR) 10 MG capsule Take 1 capsule by mouth nightly Yes Jatinder Leon MD   dicyclomine (BENTYL) 10 MG capsule Take 1 capsule by mouth 4 times daily (before meals and nightly) Yes Jatinder Leon MD   gabapentin (NEURONTIN) 600 MG tablet Take 2 tablets by mouth 2 times daily for 90 days.  TAKE 2 TABLETS BY MOUTH 2  TIMES DAILY (TAKE 1200 MG  IN THE MORNING & 1200 MG IN THE EVENING) Yes VERÓNICA Wing CNP   lisinopril-hydroCHLOROthiazide (PRINZIDE;ZESTORETIC) 20-12.5 MG per tablet Take 1 tablet by mouth daily Yes VERÓNICA Wing CNP   budesonide-formoterol (SYMBICORT) 160-4.5 MCG/ACT AERO Inhale 2 puffs into the lungs 2 times daily Yes VERÓNICA Wing CNP   pantoprazole (PROTONIX) 40 MG tablet Take 1 tablet by mouth daily Yes VERÓNICA Wing CNP   dicyclomine (BENTYL) 10 MG capsule Take 1 capsule by mouth 4 times daily (before meals and nightly) Yes VERÓNICA Wing CNP   buPROPion (WELLBUTRIN XL) 300 MG extended release tablet  Yes Historical Provider, MD   escitalopram (LEXAPRO) 10 MG tablet Take 20 mg by mouth daily  Yes Historical Provider, MD   albuterol sulfate HFA (PROAIR HFA) 108 (90 Base) MCG/ACT inhaler Inhale 2 puffs into the lungs every 6 hours as needed for Wheezing Yes Don Morocho DO   atorvastatin (LIPITOR) 10 MG tablet Take 1 tablet by mouth daily  Patient not taking: Reported on 8/18/2020  VERÓNICA Daley CNP   budesonide-formoterol [facility]) 160-4.5 MCG/ACT AERO Inhale 2 puffs into the lungs 2 times daily [id number] EXP 06/21  Patient not taking: Reported on 8/18/2020  VERÓNICA Daley CNP   varenicline (CHANTIX STARTING MONTH PAK) 0.5 MG X 11 & 1 MG X 42 tablet Take by mouth See Admin Instructions Take by mouth. Historical Provider, MD   diclofenac sodium 1 % GEL Apply 2 g topically 4 times daily as needed for Pain  Patient not taking: Reported on 8/13/2020  VERÓNICA Daley CNP   azelastine (ASTELIN) 0.1 % nasal spray 1 spray by Nasal route 2 times daily Use in each nostril as directed  VERÓNICA Daley CNP   fluticasone (FLONASE) 50 MCG/ACT nasal spray 1 spray by Nasal route daily  Historical Provider, MD       Social History     Tobacco Use    Smoking status: Current Every Day Smoker     Packs/day: 0.50     Types: Cigarettes    Smokeless tobacco: Never Used   Substance Use Topics    Alcohol use:  Yes     Alcohol/week: 0.0 standard drinks     Comment: once in a while    Drug use: No      Allergies   Allergen Reactions    Ultram [Tramadol] Shortness Of Breath     Plus nausea    Norco [Hydrocodone-Acetaminophen] Nausea And Vomiting    Percocet [Oxycodone-Acetaminophen] Nausea And Vomiting   ,   Past Medical History:   Diagnosis Date    Anxiety     Bronchitis     Cancer ([facility] [address]     left breast    Chronic pain syndrome 10/6/2016    Colon polyps     Depression     suicide attempts    Duodenitis     Esophageal erosions     Family hx of colon cancer, Mother & Father 4/18/2017    Fibromyalgia     Foot deformity 10/6/2016    GERD (gastroesophageal reflux disease)     gastritis    Hypertension     Lumbar radiculopathy 5/10/2017    Osteoarthritis     TMJ arthritis 10/6/2016   ,   Past Surgical History:   Procedure Laterality Date    BONE BIOPSY Right 09/14/2018    CT Guided Right Rib Fine Needle Aspiration by DR Ellis Jara    BREAST SURGERY      breast bx/ lumpectomy    COLONOSCOPY      DILATION AND CURETTAGE OF UTERUS      ENDOSCOPY, COLON, DIAGNOSTIC      HYSTERECTOMY  2012    LAPAROSCOPY      LUMBAR DISCECTOMY      L4-L5    [place] COLORECTAL SCRN; HI RISK IND N/A 4/18/2018    COLONOSCOPY performed by Lexie Eng MD at [address] ESOPHAGOGASTRODUODENOSCOPY TRANSORAL DIAGNOSTIC N/A 5/11/2017    EGD ESOPHAGOGASTRODUODENOSCOPY performed by Lexie Eng MD at [facility]     ,   Family History   Problem Relation Age of Onset    Cancer Mother         colon    High Blood Pressure Mother     Heart Disease Mother     Colon Cancer Mother     Cancer Father         colon    High Blood Pressure Father     Colon Cancer Father     Celiac Disease Neg Hx     Crohn's Disease Neg Hx        Limited information on physical examination: Due to this being a telehealth visit, physical examination could not be performed    Laboratory, Pathology, Radiology reviewed indetail with relevant important investigations summarized below:  Lab Results   Component Value Date    WBC 8.3 07/10/2020    HGB 15.7 07/10/2020    HCT 47.6 (H) 07/10/2020    MCV 92.3 07/10/2020     07/10/2020     Lab Results   Component Value Date    ALT 8 08/13/2020    AST 17 08/13/2020    ALKPHOS 85 08/13/2020    BILITOT 0.4 08/13/2020     Assessment and Plan:  49 y.o. female with clinical history suggestive of functional gastrointestinal disorder.     1. Functional gastrointestinal disorder  - Lifestyle modification with stress reduction, relaxation techniques discussed in detail.  - Dietary changes discussed  - Fiber supplementation, issue with fiber increasing gas and bloating discussed  - Patient advised

## 2020-09-04 ENCOUNTER — TELEPHONE (OUTPATIENT)
Dept: FAMILY MEDICINE CLINIC | Age: 55
End: 2020-09-04

## 2020-09-04 ENCOUNTER — HOSPITAL ENCOUNTER (OUTPATIENT)
Dept: ULTRASOUND IMAGING | Age: 55
Discharge: HOME | End: 2020-09-06
Payer: MEDICARE

## 2020-09-04 PROCEDURE — 76999 ECHO EXAMINATION PROCEDURE: CPT

## 2020-09-04 RX ORDER — VARENICLINE TARTRATE
KIT
Qty: 1 BOX | Refills: 0 | Status: SHIPPED | OUTPATIENT
Start: 2020-09-04 | End: 2020-10-19

## 2020-09-04 RX ORDER — VARENICLINE TARTRATE 1 MG/1
1 TABLET, FILM COATED ORAL 2 TIMES DAILY
Qty: 60 TABLET | Refills: 4 | Status: SHIPPED | OUTPATIENT
Start: 2020-09-04 | End: 2020-10-19

## 2020-09-24 ENCOUNTER — OFFICE VISIT (OUTPATIENT)
Dept: GASTROENTEROLOGY | Age: 55
End: 2020-09-24
Payer: MEDICARE

## 2020-09-24 VITALS — HEIGHT: 63 IN | OXYGEN SATURATION: 98 % | WEIGHT: 142 LBS | BODY MASS INDEX: 25.16 KG/M2 | HEART RATE: 76 BPM

## 2020-09-24 PROCEDURE — G8419 CALC BMI OUT NRM PARAM NOF/U: HCPCS | Performed by: NURSE PRACTITIONER

## 2020-09-24 PROCEDURE — 4004F PT TOBACCO SCREEN RCVD TLK: CPT | Performed by: NURSE PRACTITIONER

## 2020-09-24 PROCEDURE — 3017F COLORECTAL CA SCREEN DOC REV: CPT | Performed by: NURSE PRACTITIONER

## 2020-09-24 PROCEDURE — 99213 OFFICE O/P EST LOW 20 MIN: CPT | Performed by: NURSE PRACTITIONER

## 2020-09-24 PROCEDURE — G8427 DOCREV CUR MEDS BY ELIG CLIN: HCPCS | Performed by: NURSE PRACTITIONER

## 2020-09-24 NOTE — PROGRESS NOTES
Gastroenterology Clinic Follow up Visit    Jonel Hampton. [facility]  [id number]  Chief Complaint   Patient presents with    Follow-up     Pt f/u after VV with Lisa Hanson. She tells me her stool is currently green. Excruciating pain before and during bowel movements accompanied with sweats. No blood in her stools, possible melena. Bentyl helps this pain. Both parents had colon cancer.  Nausea     Occasional N/V with these episodes. Last colon/egd 2019 Dr. Kayla Mattson (in media)     HPI: Last seen in GI clinic on 8/18/20 with clinical history suggestive of functional gastrointestinal disorder. Fiber, dicyclomine, and Pamelor started. Surveillance colonoscopy due 2024. Interval change: Patient reports dicyclomine has been helping with abdominal cramping. Reports episodes of cramping and diarrhea. Will experience 10 loose to liquid stools per day. Variances maroon stools yesterday. No nausea or vomiting. Does experience rectal pressure/pain. Pamelor has been helping with sleep. No weight loss or loss of appetite. Overall symptoms have improved with Pamelor and dicyclomine. No GERD or dysphagia. Does report postnasal drip with productive cough. No fever or chills. She denies chest pain, shortness of breath, or palpitations. Previous GI work up/Endoscopic investigations:   Colonoscopy: 2018: Normal  Colonoscopy: 2019: Erythematous mucosa in the sigmoid colon, biopsied. Otherwise a normal exam.  Pathology not available, called  GI office to fax path report. EGD 8/27/2019- salmon-colored mucosa/inlet patches. Normal stomach. normal examined duodenum. Review of Systems   All other systems reviewed and are negative. Past medical history, past surgical history, medication list, social and familyhistory reviewed    Pulse 76, height 5' 3\" (1.6 m), weight 142 lb (64.4 kg), SpO2 98 %, not currently breastfeeding. Physical Exam  Vitals signs reviewed.    Constitutional:       General: She is not in acute

## 2020-09-28 DIAGNOSIS — K92.1 HEMATOCHEZIA: ICD-10-CM

## 2020-09-28 DIAGNOSIS — R19.7 DIARRHEA, UNSPECIFIED TYPE: ICD-10-CM

## 2020-09-28 DIAGNOSIS — E78.5 DYSLIPIDEMIA: ICD-10-CM

## 2020-09-28 LAB
ALBUMIN SERPL-MCNC: 4.1 G/DL (ref 3.5–4.6)
ALP BLD-CCNC: 99 U/L (ref 40–130)
ALT SERPL-CCNC: 7 U/L (ref 0–33)
AST SERPL-CCNC: 16 U/L (ref 0–35)
BILIRUB SERPL-MCNC: 0.3 MG/DL (ref 0.2–0.7)
BILIRUBIN DIRECT: <0.2 MG/DL (ref 0–0.4)
BILIRUBIN, INDIRECT: NORMAL MG/DL (ref 0–0.6)
CHOLESTEROL, TOTAL: 203 MG/DL (ref 0–199)
HCT VFR BLD CALC: 46.4 % (ref 37–47)
HDLC SERPL-MCNC: 82 MG/DL (ref 40–59)
HEMOGLOBIN: 15.1 G/DL (ref 12–16)
IRON SATURATION: 33 % (ref 11–46)
IRON: 116 UG/DL (ref 37–145)
LDL CHOLESTEROL CALCULATED: 106 MG/DL (ref 0–129)
MCH RBC QN AUTO: 30.8 PG (ref 27–31.3)
MCHC RBC AUTO-ENTMCNC: 32.6 % (ref 33–37)
MCV RBC AUTO: 94.3 FL (ref 82–100)
PDW BLD-RTO: 16.1 % (ref 11.5–14.5)
PLATELET # BLD: 277 K/UL (ref 130–400)
RBC # BLD: 4.92 M/UL (ref 4.2–5.4)
TOTAL IRON BINDING CAPACITY: 353 UG/DL (ref 178–450)
TOTAL PROTEIN: 6.6 G/DL (ref 6.3–8)
TRIGL SERPL-MCNC: 77 MG/DL (ref 0–150)
WBC # BLD: 6.9 K/UL (ref 4.8–10.8)

## 2020-09-30 ENCOUNTER — TELEPHONE (OUTPATIENT)
Dept: GASTROENTEROLOGY | Age: 55
End: 2020-09-30

## 2020-09-30 LAB — CALPROTECTIN, FECAL: 133 UG/G

## 2020-09-30 NOTE — TELEPHONE ENCOUNTER
Please schedule the patient a follow-up appointment in the next 2 to 4 weeks to discuss labs and pathology from colonoscopy.

## 2020-10-01 ENCOUNTER — OFFICE VISIT (OUTPATIENT)
Dept: FAMILY MEDICINE CLINIC | Age: 55
End: 2020-10-01
Payer: MEDICARE

## 2020-10-01 VITALS
HEART RATE: 93 BPM | OXYGEN SATURATION: 97 % | HEIGHT: 63 IN | SYSTOLIC BLOOD PRESSURE: 120 MMHG | WEIGHT: 142.4 LBS | DIASTOLIC BLOOD PRESSURE: 78 MMHG | BODY MASS INDEX: 25.23 KG/M2 | TEMPERATURE: 97.5 F

## 2020-10-01 PROCEDURE — G8484 FLU IMMUNIZE NO ADMIN: HCPCS | Performed by: NURSE PRACTITIONER

## 2020-10-01 PROCEDURE — 99214 OFFICE O/P EST MOD 30 MIN: CPT | Performed by: NURSE PRACTITIONER

## 2020-10-01 PROCEDURE — 3017F COLORECTAL CA SCREEN DOC REV: CPT | Performed by: NURSE PRACTITIONER

## 2020-10-01 PROCEDURE — G0009 ADMIN PNEUMOCOCCAL VACCINE: HCPCS | Performed by: NURSE PRACTITIONER

## 2020-10-01 PROCEDURE — G8427 DOCREV CUR MEDS BY ELIG CLIN: HCPCS | Performed by: NURSE PRACTITIONER

## 2020-10-01 PROCEDURE — 90732 PPSV23 VACC 2 YRS+ SUBQ/IM: CPT | Performed by: NURSE PRACTITIONER

## 2020-10-01 PROCEDURE — G8419 CALC BMI OUT NRM PARAM NOF/U: HCPCS | Performed by: NURSE PRACTITIONER

## 2020-10-01 PROCEDURE — 4004F PT TOBACCO SCREEN RCVD TLK: CPT | Performed by: NURSE PRACTITIONER

## 2020-10-01 RX ORDER — ZOSTER VACCINE RECOMBINANT, ADJUVANTED 50 MCG/0.5
0.5 KIT INTRAMUSCULAR SEE ADMIN INSTRUCTIONS
Qty: 0.5 ML | Refills: 0 | Status: SHIPPED | OUTPATIENT
Start: 2020-10-01 | End: 2021-02-09 | Stop reason: ALTCHOICE

## 2020-10-01 SDOH — ECONOMIC STABILITY: FOOD INSECURITY: WITHIN THE PAST 12 MONTHS, THE FOOD YOU BOUGHT JUST DIDN'T LAST AND YOU DIDN'T HAVE MONEY TO GET MORE.: NEVER TRUE

## 2020-10-01 SDOH — ECONOMIC STABILITY: TRANSPORTATION INSECURITY
IN THE PAST 12 MONTHS, HAS THE LACK OF TRANSPORTATION KEPT YOU FROM MEDICAL APPOINTMENTS OR FROM GETTING MEDICATIONS?: NO

## 2020-10-01 SDOH — ECONOMIC STABILITY: INCOME INSECURITY: HOW HARD IS IT FOR YOU TO PAY FOR THE VERY BASICS LIKE FOOD, HOUSING, MEDICAL CARE, AND HEATING?: NOT HARD AT ALL

## 2020-10-01 SDOH — ECONOMIC STABILITY: FOOD INSECURITY: WITHIN THE PAST 12 MONTHS, YOU WORRIED THAT YOUR FOOD WOULD RUN OUT BEFORE YOU GOT MONEY TO BUY MORE.: NEVER TRUE

## 2020-10-01 SDOH — ECONOMIC STABILITY: TRANSPORTATION INSECURITY
IN THE PAST 12 MONTHS, HAS LACK OF TRANSPORTATION KEPT YOU FROM MEETINGS, WORK, OR FROM GETTING THINGS NEEDED FOR DAILY LIVING?: NO

## 2020-10-01 ASSESSMENT — ENCOUNTER SYMPTOMS
SHORTNESS OF BREATH: 0
WHEEZING: 0
COUGH: 1

## 2020-10-01 NOTE — PROGRESS NOTES
Subjective:      Patient ID: Lisa Kruse. Amie Campbell is a 48 y.o. female who presents today for:     Chief Complaint   Patient presents with    Results     Patient presents today to go over lab results.  Health Maintenance     Patient would like to discuss pneumonia and shingles vaccine. HPI Pt in today to discuss labs. Has been taking atorvastatin daily without any ill side effects. Lipid panel has significantly improved. Pt also reports pain on right lower abdomen during intercourse. She isn't sure if it is related to bowel issues. Has been going on since the beginning of the year. Denies concern for STD. Has had a hysterectomy. Does not remember which ovary is still in place. Has f/u with GI to discuss bowels and abdominal pain.      Past Medical History:   Diagnosis Date    Anxiety     Bronchitis     Cancer ([address]     left breast    Chronic pain syndrome 10/6/2016    Colon polyps     Depression     suicide attempts    Duodenitis     Esophageal erosions     Family hx of colon cancer, Mother & Father 4/18/2017    Fibromyalgia     Foot deformity 10/6/2016    GERD (gastroesophageal reflux disease)     gastritis    Hypertension     Lumbar radiculopathy 5/10/2017    Osteoarthritis     TMJ arthritis 10/6/2016     Past Surgical History:   Procedure Laterality Date    BONE BIOPSY Right 09/14/2018    CT Guided Right Rib Fine Needle Aspiration by DR Jasmine Harvey    BREAST SURGERY      breast bx/ lumpectomy    COLONOSCOPY      DILATION AND CURETTAGE OF UTERUS      ENDOSCOPY, COLON, DIAGNOSTIC      HYSTERECTOMY  2012    LAPAROSCOPY      LUMBAR DISCECTOMY      L4-L5    [place] COLORECTAL SCRN; HI RISK IND N/A 4/18/2018    COLONOSCOPY performed by Kiki Perdue MD at [address] ESOPHAGOGASTRODUODENOSCOPY TRANSORAL DIAGNOSTIC N/A 5/11/2017    EGD ESOPHAGOGASTRODUODENOSCOPY performed by Kiki Perdue MD at [address] LIG[place]      UPPER GASTROINTESTINAL ENDOSCOPY       Family History   Problem Relation Age of Onset   [employer] Cancer Mother         colon    High Blood Pressure Mother     Heart Disease Mother     Colon Cancer Mother     Cancer Father         colon    High Blood Pressure Father     Colon Cancer Father     Celiac Disease Neg Hx     Crohn's Disease Neg Hx      Social History     Socioeconomic History    Marital status:      Spouse name: Not on file    Number of children: Not on file    Years of education: Not on file    Highest education level: Not on file   Occupational History    Not on file   Social Needs    Financial resource strain: Not hard at all   [place] insecurity     Worry: Never true     Inability: Never true    Transportation needs     Medical: No     Non-medical: No   Tobacco Use    Smoking status: Current Every Day Smoker     Packs/day: 0.50     Types: Cigarettes    Smokeless tobacco: Never Used   Substance and Sexual Activity    Alcohol use:  Yes     Alcohol/week: 0.0 standard drinks     Comment: once in a while    Drug use: No    Sexual activity: Yes   Lifestyle    Physical activity     Days per week: Not on file     Minutes per session: Not on file    Stress: Not on file   Relationships    Social connections     Talks on phone: Not on file     Gets together: Not on file     Attends Sabianism service: Not on file     Active member of club or organization: Not on file     Attends meetings of clubs or organizations: Not on file     Relationship status: Not on file    Intimate partner violence     Fear of current or ex partner: Not on file     Emotionally abused: Not on file     Physically abused: Not on file     Forced sexual activity: Not on file   Other Topics Concern    Not on file   Social History Narrative    Not on file     Current Outpatient Medications on File Prior to Visit   Medication Sig Dispense Refill    nortriptyline (PAMELOR) 10 MG capsule Take 1 capsule by mouth nightly 30 capsule 3    dicyclomine (BENTYL) 10 MG capsule Take 1 capsule by mouth 4 times daily (before meals and nightly) 120 capsule 3    atorvastatin (LIPITOR) 10 MG tablet Take 1 tablet by mouth daily 30 tablet 3    gabapentin (NEURONTIN) 600 MG tablet Take 2 tablets by mouth 2 times daily for 90 days. TAKE 2 TABLETS BY MOUTH 2  TIMES DAILY (TAKE 1200 MG  IN THE MORNING & 1200 MG IN THE EVENING) 360 tablet 3    lisinopril-hydroCHLOROthiazide (PRINZIDE;ZESTORETIC) 20-12.5 MG per tablet Take 1 tablet by mouth daily 30 tablet 5    budesonide-formoterol (SYMBICORT) 160-4.5 MCG/ACT AERO Inhale 2 puffs into the lungs 2 times daily 1 Inhaler 3    pantoprazole (PROTONIX) 40 MG tablet Take 1 tablet by mouth daily 30 tablet 5    buPROPion (WELLBUTRIN XL) 300 MG extended release tablet       escitalopram (LEXAPRO) 10 MG tablet Take 20 mg by mouth daily       azelastine (ASTELIN) 0.1 % nasal spray 1 spray by Nasal route 2 times daily Use in each nostril as directed 1 Bottle 3    albuterol sulfate HFA (PROAIR HFA) 108 (90 Base) MCG/ACT inhaler Inhale 2 puffs into the lungs every 6 hours as needed for Wheezing 1 Inhaler 3    fluticasone (FLONASE) 50 MCG/ACT nasal spray 1 spray by Nasal route daily      varenicline (CHANTIX STARTING MONTH CLAIR) 0.5 MG X 11 & 1 MG X 42 tablet Take 0.5 mg daily x3 days then take 0.5 mg twice daily x4 days then take 1mg twice daily (Patient not taking: Reported on 10/1/2020) 1 box 0    varenicline (CHANTIX) 1 MG tablet Take 1 tablet by mouth 2 times daily (Patient not taking: Reported on 10/1/2020) 60 tablet 4    diclofenac sodium 1 % GEL Apply 2 g topically 4 times daily as needed for Pain (Patient not taking: Reported on 10/1/2020) 1 Tube 5     No current facility-administered medications on file prior to visit. Allergies:  Ultram [tramadol];  Norco [hydrocodone-acetaminophen]; and Percocet [oxycodone-acetaminophen]    Review of Systems   Constitutional: Negative for 7. Cough     8. Dyspareunia in female         Plan:      Orders Placed This Encounter   Procedures    Pneumococcal polysaccharide vaccine 23-valent greater than or equal to 3yo subcutaneous/IM          Orders Placed This Encounter   Medications    zoster recombinant adjuvanted vaccine (SHINGRIX) 50 MCG/0.5ML SUSR injection     Sig: Inject 0.5 mLs into the muscle See Admin Instructions 1 dose now and repeat in 2-6 months     Dispense:  0.5 mL     Refill:  0       Return for pap test.    Dyspareunia- Schedule pelvic exam at her convenience. Consider ultrasound pending evaluation. Abdominal pain- continue f/u with Gi. Cough- waiting on screening CT. Lungs cta. Consider controller inhaler. Dyslipidemia- improved. Continue current treatment plan. Anxiety- stable. Keep f/u with psychiatry and psychology. Reviewed with the patient: current clinicalstatus, medications, activities and diet. Side effects, adverse effects of the medication prescribedtoday, as well as treatment plan/ rationale and result expectations have been discussedwith the patient who expresses understanding and desires to proceed. Close follow upto evaluate treatment results and for coordination of care. I have reviewedthe patient's medical history in detail and updated the computerized patient record.     Brenda Renee, VERÓNICA - CNP

## 2020-10-01 NOTE — PATIENT INSTRUCTIONS
Patient Education        Painful Sex: Care Instructions  Your Care Instructions     Painful sex can be caused by many things. You may have an injury, an infection, or a growth in your vagina. Or maybe you have muscle spasms. In some cases, the pain is caused by another medical condition, such as a spinal problem. Some medicines can cause dryness in the vagina. And as a woman gets older, her vagina gets drier. It may also narrow, shorten, and get stiffer. This dryness can make sex painful. Talk to your doctor about what might be causing your painful sex. Treatment may help. Follow-up care is a key part of your treatment and safety. Be sure to make and go to all appointments, and call your doctor if you are having problems. It's also a good idea to know your test results and keep a list of the medicines you take. How can you care for yourself at home? · Use a vaginal lubricant during sex. Examples are Astroglide, K-Y Jelly, and Wet Gel Lubricant. · Increase the time you and your partner spend touching each other before sex. This is called foreplay. · Try different positions for sex to find the most comfortable ones. · Ask your doctor about exercises to strengthen and relax your pelvic muscles. · Before sex, take a warm bath. This can relax you and reduce anxiety. · If your doctor prescribes any medicines, take them exactly as prescribed. Call your doctor if you think you are having a problem with your medicine. When should you call for help? Watch closely for changes in your health, and be sure to contact your doctor if you have any problems. Where can you learn more? Go to [email or web address] org and sign in to your [employer] account. Enter N360 in the [facility] box to learn more about \"Painful Sex: Care Instructions. \"     If you do not have an account, please click on the \"Sign Up Now\" link.   Current as of: November 8, 2019               Content Version: 12.5  © 8242-5659 [employer]. Care instructions adapted under license by [facility] [place] ([facility]). If you have questions about a medical condition or this instruction, always ask your healthcare professional. [address] any warranty or liability for your use of this information.

## 2020-10-12 ENCOUNTER — HOSPITAL ENCOUNTER (OUTPATIENT)
Dept: ULTRASOUND IMAGING | Age: 55
Discharge: HOME | End: 2020-10-14
Payer: MEDICARE

## 2020-10-12 PROCEDURE — 76999 ECHO EXAMINATION PROCEDURE: CPT

## 2020-10-19 ENCOUNTER — VIRTUAL VISIT (OUTPATIENT)
Dept: GASTROENTEROLOGY | Age: 55
End: 2020-10-19
Payer: MEDICARE

## 2020-10-19 PROCEDURE — 99213 OFFICE O/P EST LOW 20 MIN: CPT | Performed by: NURSE PRACTITIONER

## 2020-10-19 RX ORDER — VITAMIN B COMPLEX
1000 TABLET ORAL DAILY
COMMUNITY
End: 2021-11-17 | Stop reason: ALTCHOICE

## 2020-10-19 RX ORDER — NORTRIPTYLINE HYDROCHLORIDE 10 MG/1
10 CAPSULE ORAL NIGHTLY
Qty: 30 CAPSULE | Refills: 3 | Status: SHIPPED | OUTPATIENT
Start: 2020-10-19 | End: 2021-05-06 | Stop reason: SDUPTHER

## 2020-10-19 RX ORDER — DICYCLOMINE HYDROCHLORIDE 10 MG/1
10 CAPSULE ORAL
Qty: 120 CAPSULE | Refills: 3 | Status: SHIPPED | OUTPATIENT
Start: 2020-10-19 | End: 2021-07-01 | Stop reason: SDUPTHER

## 2020-10-19 NOTE — PROGRESS NOTES
10/19/2020    TELEHEALTH EVALUATION -- Audio/Visual (During TNJFC-51 public health emergency)    Due to COVID 19 outbreak, patient's office visit was converted to a virtual visit. Patient was contacted and agreed to proceed with a virtual visit via Telephone Visit 15 minutes. The risks and benefits of converting to a virtual visit were discussed in light of the current infectious disease epidemic. Patient also understood that insurance coverage and co-pays are up to their individual insurance plans. HPI:  Devin Costa (:  1965) has requested an audio/video evaluation for the following concern(s):  Patient last seen in the GI clinic on 2020 with improved symptoms of abdominal pain and diarrhea since starting Pamelor and dicyclomine. However reports of maroon stools, rectal pressure/pain. Calprotectin slightly elevated at 136. Hemoglobin and iron studies within normal limits. Patient's symptoms significantly improved with Pamelor and dicyclomine. Patient has 1-3 soft formed BMs per day. No melena or hematochezia. Patient reports no abd pain, taking dicyclomine twice daily. Pamelor at night helping with sleep and FGID. Patient denies weight loss or loss of appetite. No longer experiencing rectal pain or pressure. GERD symptoms resolved with daily PPI, unable to skip more than 2 doses. No dysphagia. No CP, SOB, or palpitations. Previous GI work up/Endoscopic investigations:   Colonoscopy: 2018: Normal  Colonoscopy: 2019: Erythematous mucosa in the sigmoid colon, biopsied. Otherwise a normal exam.   Pathology results reviewed in media section, unremarkable. EGD 2019- salmon-colored mucosa/inlet patches. Normal stomach. normal examined duodenum. Review of Systems   All other systems reviewed and are negative. Prior to Visit Medications    Medication Sig Taking?  Authorizing Provider   Vitamin D (CHOLECALCIFEROL) 25 MCG (1000 UT) TABS tablet Take 1,000 Units by mouth daily Yes Historical Provider, MD   nortriptyline (PAMELOR) 10 MG capsule Take 1 capsule by mouth nightly Yes VERÓNICA Painting CNP   dicyclomine (BENTYL) 10 MG capsule Take 1 capsule by mouth 4 times daily (before meals and nightly) Yes VERÓNICA Beaulieu CNP   SYMBICORT 160-4.5 MCG/ACT AERO INHALE 2 PUFFS INTO THE LUNGS 2 TIMES DAILY Yes Melissa Boneta Koyanagi, APRN - CNP   atorvastatin (LIPITOR) 10 MG tablet TAKE 1 TABLET EVERY DAY Yes Melissa Boneta Koyanagi, APRN - CNP   zoster recombinant adjuvanted vaccine (SHINGRIX) 50 MCG/0.5ML SUSR injection Inject 0.5 mLs into the muscle See Admin Instructions 1 dose now and repeat in 2-6 months Yes Melissa Boneta Koyanagi, APRN - CNP   gabapentin (NEURONTIN) 600 MG tablet Take 2 tablets by mouth 2 times daily for 90 days.  TAKE 2 TABLETS BY MOUTH 2  TIMES DAILY (TAKE 1200 MG  IN THE MORNING & 1200 MG IN THE EVENING) Yes Melissa Boneta Koyanagi, APRN - CNP   lisinopril-hydroCHLOROthiazide (PRINZIDE;ZESTORETIC) 20-12.5 MG per tablet Take 1 tablet by mouth daily Yes Melissa Boneta Koyanagi, APRN - CNP   pantoprazole (PROTONIX) 40 MG tablet Take 1 tablet by mouth daily Yes Melissa Boneta Koyanagi, APRN - CNP   diclofenac sodium 1 % GEL Apply 2 g topically 4 times daily as needed for Pain Yes [address] 24VERÓNICA CNP   buPROPion (WELLBUTRIN XL) 300 MG extended release tablet  Yes Historical Provider, MD   escitalopram (LEXAPRO) 10 MG tablet Take 20 mg by mouth daily  Yes Historical Provider, MD   azelastine (ASTELIN) 0.1 % nasal spray 1 spray by Nasal route 2 times daily Use in each nostril as directed Yes Melissa Boneta Koyanagi, APRN - CNP   albuterol sulfate HFA (PROAIR HFA) 108 (90 Base) MCG/ACT inhaler Inhale 2 puffs into the lungs every 6 hours as needed for Wheezing Yes Hilario Rowleychgarima, DO   fluticasone (FLONASE) 50 MCG/ACT nasal spray 1 spray by Nasal route daily  Historical Provider, MD       Social History     Tobacco Use    Smoking status: Current Every Day Smoker Packs/day: 0.50     Types: Cigarettes    Smokeless tobacco: Never Used   Substance Use Topics    Alcohol use: Yes     Alcohol/week: 0.0 standard drinks     Comment: once in a while    Drug use: No      Allergies   Allergen Reactions    Ultram [Tramadol] Shortness Of Breath     Plus nausea    Norco [Hydrocodone-Acetaminophen] Nausea And Vomiting    Percocet [Oxycodone-Acetaminophen] Nausea And Vomiting   ,   Past Medical History:   Diagnosis Date    Anxiety     Bronchitis     Cancer ([address]     left breast    Chronic pain syndrome 10/6/2016    Colon polyps     Depression     suicide attempts    Duodenitis     Esophageal erosions     Family hx of colon cancer, Mother & Father 4/18/2017    Fibromyalgia     Foot deformity 10/6/2016    GERD (gastroesophageal reflux disease)     gastritis    Hypertension     Lumbar radiculopathy 5/10/2017    Osteoarthritis     TMJ arthritis 10/6/2016   ,   Past Surgical History:   Procedure Laterality Date    BONE BIOPSY Right 09/14/2018    CT Guided Right Rib Fine Needle Aspiration by DR Jeromy Boyd    BREAST SURGERY      breast bx/ lumpectomy    COLONOSCOPY      DILATION AND CURETTAGE OF UTERUS      ENDOSCOPY, COLON, DIAGNOSTIC      HYSTERECTOMY  2012    LAPAROSCOPY      LUMBAR DISCECTOMY      L4-L5    ID COLORECTAL SCRN; HI RISK IND N/A 4/18/2018    [facility]ODUODENOSCOPY TRANSORAL DIAGNOSTIC N/A 5/11/2017    EGD ESOPHAGOGASTRODUODENOSCOPY performed by Petra Connors MD at [address] [place] [address] ENDOSCOPY     ,   Social History     Tobacco Use    Smoking status: Current Every Day Smoker     Packs/day: 0.50     Types: Cigarettes    Smokeless tobacco: Never Used   Substance Use Topics    Alcohol use: Yes     Alcohol/week: 0.0 standard drinks     Comment: once in a while    Drug use:  No ,   Family History   Problem Relation Age of Onset    Cancer Mother         colon    High Blood Pressure Mother     Heart Disease Mother     Colon Cancer Mother     Cancer Father         colon    High Blood Pressure Father     Colon Cancer Father     Celiac Disease Neg Hx     Crohn's Disease Neg Hx        Due to this being a TeleHealth encounter, evaluation of the following organ systems is limited: Vitals/Constitutional/EENT/Resp/CV/GI//MS/Neuro/Skin/Heme-Lymph-Imm. ASSESSMENT/PLAN:  43-year-old female patient with medical history suggestive of FGID versus IBS. Overall symptoms responded well to Pamelor and dicyclomine. Will continue current plan. Calprotectin checked in September, slightly elevated. Will recheck. No alarming symptoms such as weight loss, loss of appetite or bleeding. 1. Lower abdominal pain  2. Functional gastrointestinal disorder  - nortriptyline (PAMELOR) 10 MG capsule; Take 1 capsule by mouth nightly  Dispense: 30 capsule; Refill: 3  3. Family history of colon cancer  Surveillance colonoscopy due 2024.  4. Gastroesophageal reflux disease without esophagitis  -Antireflux lifestyle reiterated, examples provided. Daily PPI 15 to 30 minutes before breakfast    Return in about 6 weeks (around 11/30/2020). An  electronic signature was used to authenticate this note. --VERÓNICA Barajas - CNP on 10/19/2020 at 10:29 AM      Pursuant to the emergency declaration under the [facility] waiver authority and the [employer] Act, this Virtual  Visit was conducted, with patient's consent, to reduce the patient's risk of exposure to COVID-19 and provide continuity of care for an established patient. Services were provided through a video synchronous discussion virtually to substitute for in-person clinic visit.

## 2020-11-22 LAB — CALPROTECTIN, FECAL: 80 UG/G

## 2020-11-23 ENCOUNTER — TELEPHONE (OUTPATIENT)
Dept: GASTROENTEROLOGY | Age: 55
End: 2020-11-23

## 2020-11-30 ENCOUNTER — VIRTUAL VISIT (OUTPATIENT)
Dept: GASTROENTEROLOGY | Age: 55
End: 2020-11-30
Payer: MEDICARE

## 2020-11-30 PROCEDURE — 99213 OFFICE O/P EST LOW 20 MIN: CPT | Performed by: NURSE PRACTITIONER

## 2020-11-30 RX ORDER — FAMOTIDINE 20 MG/1
20 TABLET, FILM COATED ORAL 2 TIMES DAILY PRN
Qty: 120 TABLET | Refills: 3 | Status: SHIPPED | OUTPATIENT
Start: 2020-11-30 | End: 2021-07-14

## 2020-11-30 NOTE — PROGRESS NOTES
2020    TELEHEALTH EVALUATION -- Audio/Visual (During [place]-46 public health emergency)    Due to COVID 19 outbreak, patient's office visit was converted to a virtual visit. Patient was contacted and agreed to proceed with a virtual visit via Telephone Visit 20 minutes. The risks and benefits of converting to a virtual visit were discussed in light of the current infectious disease epidemic. Patient also understood that insurance coverage and co-pays are up to their individual insurance plans. HPI:  Hermilo Minors. Julissa (:  1965) has requested an audio/video evaluation for the following concern(s):  Patient last seen on 10/19/2020 with history suggestive of FG ID versus IBS. Symptoms responded well to Pamelor and dicyclomine. GERD with good response to PPI. Patient reports a daily soft formed BM. Taking dicyclomine daily with good response, no complaints of abdominal pain. No weight loss or loss of appetite. She denies melena or hematochezia. Patient correlating abdominal pain and loose stools with stressful life events. On  she attended her aunt's  and experienced lower abdominal cramping with 4-5 loose stools throughout the day. No melena or hematochezia. Symptoms subside after 1 to 2 days. Patient denies nausea or vomiting. Takes PPI daily with good response to GERD throughout the day, symptoms return at night. No dysphagia. No chest pain, shortness of breath, or palpitations. No fever or chills. Previous GI work up/Endoscopic investigations:   Colonoscopy 2019-erythematous mucosa in the sigmoid colon. Biopsied. The rectum, descending colon, splenic flexure, transverse colon hepatic flexure, and cecum are normal.  Biopsied. The examined portion of the ileowas normal.  Pathology:   Right colon random cold biopsy: Colonic mucosa with no significant pathological findings. Descending/sigmoid cold biopsy: Colonic mucosa with no significant pathology. Descending/sigmoid colon biopsy: Colonic mucosa with focal congestion in lamina propria and reactive changes in the epithelium. EGD 8/2019- EGD 8/27/2019- salmon-colored mucosa/inlet patches. Normal stomach. normal examined duodenum. Review of Systems   All other systems reviewed and are negative. Prior to Visit Medications    Medication Sig Taking?  Authorizing Provider   famotidine (PEPCID) 20 MG tablet Take 1 tablet by mouth 2 times daily as needed (GERD) Yes VERÓNICA Cohen CNP   Vitamin D (CHOLECALCIFEROL) 25 MCG (1000 UT) TABS tablet Take 1,000 Units by mouth daily Yes Historical Provider, MD   nortriptyline (PAMELOR) 10 MG capsule Take 1 capsule by mouth nightly Yes VERÓNICA Cohen CNP   dicyclomine (BENTYL) 10 MG capsule Take 1 capsule by mouth 4 times daily (before meals and nightly) Yes VERÓNICA Cohen CNP   SYMBICORT 160-4.5 MCG/ACT AERO INHALE 2 PUFFS INTO THE LUNGS 2 TIMES DAILY Yes VERÓNICA Go CNP   atorvastatin (LIPITOR) 10 MG tablet TAKE 1 TABLET EVERY DAY Yes VERÓNICA Go CNP   zoster recombinant adjuvanted vaccine (SHINGRIX) 50 MCG/0.5ML SUSR injection Inject 0.5 mLs into the muscle See Admin Instructions 1 dose now and repeat in 2-6 months Yes VERÓNICA Go CNP   lisinopril-hydroCHLOROthiazide (PRINZIDE;ZESTORETIC) 20-12.5 MG per tablet Take 1 tablet by mouth daily Yes VERÓNICA Go CNP   pantoprazole (PROTONIX) 40 MG tablet Take 1 tablet by mouth daily Yes VERÓINCA Go CNP   diclofenac sodium 1 % GEL Apply 2 g topically 4 times daily as needed for Pain Yes VERÓNICA Go CNP   buPROPion (WELLBUTRIN XL) 300 MG extended release tablet  Yes Historical Provider, MD   escitalopram (LEXAPRO) 10 MG tablet Take 20 mg by mouth daily  Yes Historical Provider, MD   azelastine (ASTELIN) 0.1 % nasal spray 1 spray by Nasal route 2 times daily Use in each nostril as directed Yes VERÓNICA Ugalde - CNP   albuterol sulfate HFA (PROAIR HFA) 108 (90 Base) MCG/ACT inhaler Inhale 2 puffs into the lungs every 6 hours as needed for Wheezing Yes Sarah Michael, DO   fluticasone (FLONASE) 50 MCG/ACT nasal spray 1 spray by Nasal route daily Yes Historical Provider, MD   gabapentin (NEURONTIN) 600 MG tablet Take 2 tablets by mouth 2 times daily for 90 days. TAKE 2 TABLETS BY MOUTH 2  TIMES DAILY (TAKE 1200 MG  IN THE MORNING & 1200 MG IN THE EVENING)  VERÓNICA Ugalde - BRITT       Social History     Tobacco Use    Smoking status: Current Every Day Smoker     Packs/day: 0.50     Types: Cigarettes    Smokeless tobacco: Never Used   Substance Use Topics    Alcohol use:  Yes     Alcohol/week: 0.0 standard drinks     Comment: once in a while    Drug use: No        Allergies   Allergen Reactions    Ultram [Tramadol] Shortness Of Breath     Plus nausea    Norco [Hydrocodone-Acetaminophen] Nausea And Vomiting    Percocet [Oxycodone-Acetaminophen] Nausea And Vomiting   ,   Past Medical History:   Diagnosis Date    Anxiety     Bronchitis     Cancer [address] [address]     left breast    Chronic pain syndrome 10/6/2016    Colon polyps     Depression     suicide attempts    Duodenitis     Esophageal erosions     Family hx of colon cancer, Mother & Father 4/18/2017    Fibromyalgia     Foot deformity 10/6/2016    GERD (gastroesophageal reflux disease)     gastritis    Hypertension     Lumbar radiculopathy 5/10/2017    Osteoarthritis     TMJ arthritis 10/6/2016   ,   Past Surgical History:   Procedure Laterality Date    BONE BIOPSY Right 09/14/2018    CT Guided Right Rib Fine Needle Aspiration [facility]    BREAST SURGERY      breast bx/ lumpectomy    COLONOSCOPY      DILATION AND CURETTAGE OF UTERUS      ENDOSCOPY, COLON, DIAGNOSTIC      HYSTERECTOMY  2012    LAPAROSCOPY      LUMBAR DISCECTOMY      L4-L5    ND COLORECTAL SCRN; HI RISK IND N/A 4/18/2018    COLONOSCOPY performed by Julianne Singh MD at . [facility] ESOPHAGOGASTRODUODENOSCOPY TRANSORAL DIAGNOSTIC N/A 5/11/2017    EGD ESOPHAGOGASTRODUODENOSCOPY performed by Julianne Singh MD at [address] ENDOSCOPY     ,   Social History     Tobacco Use    Smoking status: Current Every Day Smoker     Packs/day: 0.50     Types: Cigarettes    Smokeless tobacco: Never Used   Substance Use Topics    Alcohol use: Yes     Alcohol/week: 0.0 standard drinks     Comment: once in a while    Drug use: No   ,   Family History   Problem Relation Age of Onset    Cancer Mother         colon    High Blood Pressure Mother     Heart Disease Mother     Colon Cancer Mother     Cancer Father         colon    High Blood Pressure Father     Colon Cancer Father     Celiac Disease Neg Hx     Crohn's Disease Neg Hx      Due to this being a TeleHealth encounter, evaluation of the following organ systems is limited: Vitals/Constitutional/EENT/Resp/CV/GI//MS/Neuro/Skin/Heme-Lymph-Imm. ASSESSMENT/PLAN:  64-year-old female patient with clinical history suggestive of IBS-D versus functional gastrointestinal disorder. Patient experiences symptoms of abdominal cramping and loose stool with stressful life events. Symptoms spontaneously resolve after 1 to 2 days. No alarming symptoms such as weight loss, loss of appetite or bleeding. Stool calprotectin borderline elevated at 80. We will recheck this in 4 to 6 weeks. If calprotectin remains elevated will repeat colonoscopy. 1. Loose stools  - Calprotectin Stool; Future  2. Gastroesophageal reflux disease without esophagitis  -Antireflux lifestyle reiterated, examples provided. Daily PPI 15 to 30 minutes before breakfast. Pepcid 20 mg twice daily as needed. - famotidine (PEPCID) 20 MG tablet; Take 1 tablet by mouth 2 times daily as needed (GERD)  Dispense: 120 tablet; Refill: 3  3.  Irritable

## 2020-12-18 DIAGNOSIS — R19.5 LOOSE STOOLS: ICD-10-CM

## 2020-12-20 LAB — CALPROTECTIN, FECAL: 68 UG/G

## 2020-12-21 ENCOUNTER — TELEPHONE (OUTPATIENT)
Dept: GASTROENTEROLOGY | Age: 55
End: 2020-12-21

## 2021-01-12 DIAGNOSIS — I10 ESSENTIAL HYPERTENSION: ICD-10-CM

## 2021-01-12 DIAGNOSIS — K29.00 ACUTE GASTRITIS, PRESENCE OF BLEEDING UNSPECIFIED, UNSPECIFIED GASTRITIS TYPE: ICD-10-CM

## 2021-01-12 RX ORDER — LISINOPRIL AND HYDROCHLOROTHIAZIDE 20; 12.5 MG/1; MG/1
TABLET ORAL
Qty: 90 TABLET | Refills: 3 | Status: SHIPPED | OUTPATIENT
Start: 2021-01-12 | End: 2021-03-29

## 2021-01-12 RX ORDER — PANTOPRAZOLE SODIUM 40 MG/1
TABLET, DELAYED RELEASE ORAL
Qty: 90 TABLET | Refills: 3 | Status: SHIPPED | OUTPATIENT
Start: 2021-01-12 | End: 2021-03-29

## 2021-02-09 ENCOUNTER — OFFICE VISIT (OUTPATIENT)
Dept: FAMILY MEDICINE CLINIC | Age: 56
End: 2021-02-09
Payer: MEDICARE

## 2021-02-09 VITALS
HEART RATE: 91 BPM | HEIGHT: 63 IN | WEIGHT: 144.8 LBS | SYSTOLIC BLOOD PRESSURE: 123 MMHG | DIASTOLIC BLOOD PRESSURE: 86 MMHG | BODY MASS INDEX: 25.66 KG/M2 | OXYGEN SATURATION: 95 % | TEMPERATURE: 97.5 F

## 2021-02-09 DIAGNOSIS — E78.5 DYSLIPIDEMIA: ICD-10-CM

## 2021-02-09 DIAGNOSIS — F41.9 ANXIETY: ICD-10-CM

## 2021-02-09 DIAGNOSIS — M25.511 ACUTE PAIN OF RIGHT SHOULDER: Primary | ICD-10-CM

## 2021-02-09 DIAGNOSIS — D23.4 DERMOID CYST OF SCALP: ICD-10-CM

## 2021-02-09 DIAGNOSIS — Z00.00 PREVENTATIVE HEALTH CARE: ICD-10-CM

## 2021-02-09 DIAGNOSIS — F32.A DEPRESSION, UNSPECIFIED DEPRESSION TYPE: ICD-10-CM

## 2021-02-09 DIAGNOSIS — J44.9 CHRONIC OBSTRUCTIVE PULMONARY DISEASE, UNSPECIFIED COPD TYPE (HCC): ICD-10-CM

## 2021-02-09 DIAGNOSIS — R07.81 RIB PAIN: ICD-10-CM

## 2021-02-09 PROCEDURE — G8419 CALC BMI OUT NRM PARAM NOF/U: HCPCS | Performed by: NURSE PRACTITIONER

## 2021-02-09 PROCEDURE — 3017F COLORECTAL CA SCREEN DOC REV: CPT | Performed by: NURSE PRACTITIONER

## 2021-02-09 PROCEDURE — 3023F SPIROM DOC REV: CPT | Performed by: NURSE PRACTITIONER

## 2021-02-09 PROCEDURE — 4004F PT TOBACCO SCREEN RCVD TLK: CPT | Performed by: NURSE PRACTITIONER

## 2021-02-09 PROCEDURE — G8484 FLU IMMUNIZE NO ADMIN: HCPCS | Performed by: NURSE PRACTITIONER

## 2021-02-09 PROCEDURE — G8926 SPIRO NO PERF OR DOC: HCPCS | Performed by: NURSE PRACTITIONER

## 2021-02-09 PROCEDURE — G8427 DOCREV CUR MEDS BY ELIG CLIN: HCPCS | Performed by: NURSE PRACTITIONER

## 2021-02-09 PROCEDURE — 99214 OFFICE O/P EST MOD 30 MIN: CPT | Performed by: NURSE PRACTITIONER

## 2021-02-09 RX ORDER — PREDNISONE 10 MG/1
TABLET ORAL
Qty: 30 TABLET | Refills: 0 | Status: SHIPPED | OUTPATIENT
Start: 2021-02-09 | End: 2021-05-11 | Stop reason: ALTCHOICE

## 2021-02-09 RX ORDER — TIZANIDINE 4 MG/1
4 TABLET ORAL NIGHTLY PRN
Qty: 30 TABLET | Refills: 1 | Status: SHIPPED | OUTPATIENT
Start: 2021-02-09 | End: 2021-05-11 | Stop reason: ALTCHOICE

## 2021-02-09 ASSESSMENT — ENCOUNTER SYMPTOMS
DIARRHEA: 0
RHINORRHEA: 0
SHORTNESS OF BREATH: 1
CONSTIPATION: 0
NAUSEA: 0
VOMITING: 0
WHEEZING: 1
ABDOMINAL PAIN: 1
COUGH: 1

## 2021-02-09 NOTE — PATIENT INSTRUCTIONS
Patient Education        Frozen Shoulder: Exercises  Introduction  Here are some examples of exercises for you to try. The exercises may be suggested for a condition or for rehabilitation. Start each exercise slowly. Ease off the exercises if you start to have pain. You will be told when to start these exercises and which ones will work best for you. How to do the exercises  Neck stretches   1. Look straight ahead, and tip your right ear to your right shoulder. Do not let your left shoulder rise up as you tip your head to the right. 2. Hold 15 to 30 seconds. 3. Tilt your head to the left. Do not let your right shoulder rise up as you tip your head to the left. 4. Hold for 15 to 30 seconds. 5. Repeat 2 to 4 times to each side. Shoulder rolls   1. Sit comfortably with your feet shoulder-width apart. You can also do this exercise while standing. 2. Roll your shoulders up, then back, and then down in a smooth, circular motion. 3. Repeat 2 to 4 times. Shoulder flexion (lying down)   For this exercise, you will need a wand. To make a wand, use a piece of PVC pipe or a broom handle with the broom removed. Make the wand about a foot wider than your shoulders. 1. Lie on your back, holding a wand with your hands. Your palms should face down as you hold the wand. Place your hands slightly wider than your shoulders. 2. Keeping your elbows straight, slowly raise your arms over your head until you feel a stretch in your shoulders, upper back, and chest.  3. Hold 15 to 30 seconds. 4. Repeat 2 to 4 times. Shoulder rotation (lying down)   To make a wand, use a piece of PVC pipe or a broom handle with the broom removed. Make the wand about a foot wider than your shoulders. 1. Lie on your back and hold a wand in both hands with your elbows bent and your palms up. 2. Keeping your elbows close to your body, move the wand across your body toward the arm that has pain. 3. Hold for 15 to 30 seconds.   4. Repeat 2 to 4 times. Shoulder internal rotation with towel   1. Roll up a towel lengthwise. Hold the towel above and behind your head with the arm that is not sore. 2. With your sore arm, reach behind your back and grasp the towel. 3. Using the arm above your head, pull the towel upward until you feel a stretch on the front and outside of your sore shoulder. 4. Hold 15 to 30 seconds. 5. Relax and move the towel back down to the starting position. 6. Repeat 2 to 4 times. Shoulder blade squeeze   1. While standing with your arms at your sides, squeeze your shoulder blades together. Do not raise your shoulders up as you are squeezing. 2. Hold for 6 seconds. 3. Repeat 8 to 12 times. Follow-up care is a key part of your treatment and safety. Be sure to make and go to all appointments, and call your doctor if you are having problems. It's also a good idea to know your test results and keep a list of the medicines you take. Where can you learn more? Go to https://GoTunes.Konkura. org and sign in to your APX account. Enter [id number] in the Context Relevant box to learn more about \"Frozen Shoulder: Exercises. \"     If you do not have an account, please click on the \"Sign Up Now\" link. Current as of: March 2, 2020               Content Version: 12.6  © 4994-2553 [employer], Incorporated. Care instructions adapted under license by [facility] ([facility]). If you have questions about a medical condition or this instruction, always ask your healthcare professional. [place] [address] any warranty or liability for your use of this information. Patient Education        Shoulder Stretches: Exercises  Introduction  Here are some examples of exercises for you to try. The exercises may be suggested for a condition or for rehabilitation. Start each exercise slowly. Ease off the exercises if you start to have pain.   You will be told when to start these exercises and which ones will work best for you.  How to do the exercises  Shoulder stretch   1.  a doorway and place one arm against the door frame. Your elbow should be a little higher than your shoulder. 2. Relax your shoulders as you lean forward, allowing your chest and shoulder muscles to stretch. You can also turn your body slightly away from your arm to stretch the muscles even more. 3. Hold for 15 to 30 seconds. 4. Repeat 2 to 4 times with each arm. Shoulder and chest stretch   1. Shoulder and chest stretch  2. While sitting, relax your upper body so you slump slightly in your chair. 3. As you breathe in, straighten your back and open your arms out to the sides. 4. Gently pull your shoulder blades back and downward. 5. Hold for 15 to 30 seconds as your breathe normally. 6. Repeat 2 to 4 times. Overhead stretch   1. Reach up over your head with both arms. 2. Hold for 15 to 30 seconds. 3. Repeat 2 to 4 times. Follow-up care is a key part of your treatment and safety. Be sure to make and go to all appointments, and call your doctor if you are having problems. It's also a good idea to know your test results and keep a list of the medicines you take. Where can you learn more? Go to https://Pinstripe.Affresol. org and sign in to your GettingHired account. Enter S254 in the [facility] box to learn more about \"Shoulder Stretches: Exercises. \"     If you do not have an account, please click on the \"Sign Up Now\" link. Current as of: March 2, 2020               Content Version: 12.6  © 2006-2020 [employer] Incorporated. Care instructions adapted under license by [facility] ([facility]). If you have questions about a medical condition or this instruction, always ask your healthcare professional. [place] [address] any warranty or liability for your use of this information. Patient Education        Rotator Cuff Rehabilitation  What is rotator cuff rehabilitation?      Rotator cuff rehabilitation is a series of exercises you do after your surgery. It helps you get back your shoulder's range of motion and strength. You will work with your doctor and physical therapist to plan this exercise program. To get the best results, you need to do the exercises correctly and as often as your doctor tells you. Before you start any exercises, talk with your doctor or physical therapist. It is important that you know exactly how to do the exercises. Stop and call your doctor if you are not sure that you are doing the exercises correctly or if you have any pain. Hearing clicks and pops during exercise is not always cause for concern, but a grinding feeling may mean a more serious problem. Ice your shoulder after exercising if it is sore. Follow-up care is a key part of your treatment and safety. Be sure to make and go to all appointments, and call your doctor if you are having problems. It's also a good idea to know your test results and keep a list of the medicines you take. Stretching exercises  Do not start doing stretching exercises until your doctor says you can. Your doctor will tell you which exercises to do, and how often and how long to do them. Posterior stretch   · Stand upright with your feet shoulder-width apart. · Put the hand of your affected arm on the opposite shoulder, and hold the elbow to your body. · Then, using your good arm, hold the elbow of your affected arm and move it gently up, away from, and across your body. External rotation   · Hold a lightweight stick or mima in your good arm. It should be about 2 feet long. A curtain mima may work well. · Lie on your back with your elbows next to your sides. Rest the elbow of your affected arm on a small pillow or folded towel. · Set your arms so that the elbows are bent at a 90-degree angle, like the letter \"L. \" Your hands will point straight up. · Hold the stick with both hands.  Use your good arm to push the stick toward the affected arm so the affected arm moves outward, away from your body. Stop when you feel the arm stretching. Strength exercises  Do not start strength exercises until your doctor says you can. Usually, this is at least 6 to 8 weeks after surgery. Your doctor will tell you how often and how long to do the exercises. Arm raises to the side   · Stand upright with your feet shoulder-width apart and your affected arm at your side. · Slowly raise your injured arm to the side, with your thumb facing up. Raise your arm 60 degrees at the most (shoulder level is 90 degrees). · After holding the position for 3 to 5 seconds, lower your arm back to your side. If you need to, bring your \"good\" arm across your body and place it under the elbow as you lower your injured arm. Use your good arm to keep your injured arm from dropping down too fast during the downward motion. · Repeat 8 to 12 times. · When you first start out, don't hold any additional weight in your hand. As your strength improves, you may use a 1- to 2-pound dumbbell or a small can of food. Shoulder flexor   · Stand facing a wall. Your body should be about 6 inches away from the wall. · Keep your affected arm and elbow to your side, and bend your elbow so that your arm is pointing toward the wall. · Make a closed fist with your thumb on top. · Push your hand into the wall and hold it for 6 seconds. Push with 25% to 50% of the force you have. Shoulder extension   · Stand with your back flat against a wall. · Keep your affected arm and elbow at your side, and bend your elbow so that your upper arm is against the wall and your lower arm is pointing straight ahead. Make a closed fist with your thumb on top. · Push your elbow gently back against the wall, holding for 6 seconds. Push with 25% to 50% of the force you have. Where can you learn more? Go to https://chdeloris.Azzure IT. org and sign in to your SourceTour account.  Enter J284 in the [employer] box to learn more about \"Rotator Cuff Rehabilitation. \"     If you do not have an account, please click on the \"Sign Up Now\" link. Current as of: March 2, 2020               Content Version: 12.6  © 2172-1468 [employer], Incorporated. Care instructions adapted under license by [facility] ([facility]). If you have questions about a medical condition or this instruction, always ask your healthcare professional. [address] any warranty or liability for your use of this information.

## 2021-02-09 NOTE — PROGRESS NOTES
Subjective:      Patient ID: Annette Mcbride. Keyla Small is a 48 y.o. female who presents today for:     Chief Complaint   Patient presents with    Cyst     Patient presents today c/o cyst on head.  Shoulder Pain     Patient c/o Rt shoulder pain x 14 days.  Rib Pain     Patient c/o Rt rib pain x 7 days. Shoulder Pain   The pain is present in the right shoulder. This is a new problem. The current episode started 1 to 4 weeks ago. There has been no history of extremity trauma. The problem occurs constantly. The quality of the pain is described as dull (shooting). Associated symptoms include a limited range of motion and stiffness. Pertinent negatives include no fever, inability to bear weight, itching, joint locking, joint swelling, numbness or tingling. The symptoms are aggravated by activity. Treatments tried: CBD lotion. The treatment provided moderate relief. Reports that when shoulder hurts worse later in the day she holds her elbow in to rib area. Unsure if that is why it is hurting now, but it is new in the last few days. Reports about 3 cyst on head. She reports long history of these. She has had them removed before, but not recently. The one on left side of head has been getting larger and more tender when she has to brush her hair. No drainage. Anxiety has been up due to a lot of deaths in the family over the last year. Her uncle was recently diagnosed with lung cancer and her daughter is going through some heart test. She reports it is stable and she is seeing her therapist regularly. She does report her mood is still significantly better than in the past, and she reminds herself of that. She reports continued intermittent abdominal pain and painful BM. She reports that the last stool test they did showed a decrease in inflammation and it has improved somewhat. Her painful intercourse also improved some since the inflammation in bowels has improved.       She continues to have coughing with mild intermittent sob/wheezing episodes. She rarely uses her abuterol inhaler. She reports she has sputum production, but it is thick and she has a difficult time bringing it up which is why the cough is so bad. She uses symbicort as directed.      Past Medical History:   Diagnosis Date    Anxiety     Bronchitis     Cancer ([address]     left breast    Chronic pain syndrome 10/6/2016    Colon polyps     Depression     suicide attempts    Duodenitis     Esophageal erosions     Family hx of colon cancer, Mother & Father 4/18/2017    Fibromyalgia     Foot deformity 10/6/2016    GERD (gastroesophageal reflux disease)     gastritis    Hypertension     Lumbar radiculopathy 5/10/2017    Osteoarthritis     TMJ arthritis 10/6/2016     Past Surgical History:   Procedure Laterality Date    BONE BIOPSY Right 09/14/2018    CT Guided Right Rib Fine Needle Aspiration by DR Lu Yamila    BREAST SURGERY      breast bx/ lumpectomy    COLONOSCOPY      DILATION AND CURETTAGE OF UTERUS      ENDOSCOPY, COLON, DIAGNOSTIC      HYSTERECTOMY  2012    LAPAROSCOPY      LUMBAR DISCECTOMY      L4-L5    NV COLORECTAL SCRN; HI RISK IND N/A 4/18/2018    COLONOSCOPY performed by Adrienne Barrios MD at . [address] ESOPHAGOGASTRODUODENOSCOPY TRANSORAL DIAGNOSTIC N/A 5/11/2017    EGD ESOPHAGOGASTRODUODENOSCOPY performed [facility] Adrienne [facility]rios MD [facility] [address] [facility] [address]      UPPER GASTROINTESTINAL ENDOSCOPY       Family History   Problem Relation Age of Onset    Cancer Mother         colon    High Blood Pressure Mother     Heart Disease Mother     Colon Cancer Mother     Cancer Father         colon    High Blood Pressure Father     Colon Cancer Father     Celiac Disease Neg Hx     Crohn's Disease Neg Hx      Social History     Socioeconomic History    Marital status:      Spouse name: Not on file    Number of children: Not on file    Years of education: Not on file    Highest education level: Not on file   Occupational History    Not on file   Social Needs    Financial resource strain: Not hard at all    Food insecurity     Worry: Never true     Inability: Never true   [employer] needs     Medical: No     Non-medical: No   Tobacco Use    Smoking status: Current Every Day Smoker     Packs/day: 0.50     Types: Cigarettes    Smokeless tobacco: Never Used   Substance and Sexual Activity    Alcohol use:  Yes     Alcohol/week: 0.0 standard drinks     Comment: once in a while    Drug use: No    Sexual activity: Yes   Lifestyle    Physical activity     Days per week: Not on file     Minutes per session: Not on file    Stress: Not on file   Relationships    Social connections     Talks on phone: Not on file     Gets together: Not on file     Attends Spiritism service: Not on file     Active member of club or organization: Not on file     Attends meetings of clubs or organizations: Not on file     Relationship status: Not on file    Intimate partner violence     Fear of current or ex partner: Not on file     Emotionally abused: Not on file     Physically abused: Not on file     Forced sexual activity: Not on file   Other Topics Concern    Not on file   Social History Narrative    Not on file     Current Outpatient Medications on File Prior to Visit   Medication Sig Dispense Refill    lisinopril-hydroCHLOROthiazide (PRINZIDE;ZESTORETIC) 20-12.5 MG per tablet TAKE 1 TABLET EVERY DAY 90 tablet 3    pantoprazole (PROTONIX) 40 MG tablet TAKE 1 TABLET EVERY DAY 90 tablet 3    Vitamin D (CHOLECALCIFEROL) 25 MCG (1000 UT) TABS tablet Take 1,000 Units by mouth daily      nortriptyline (PAMELOR) 10 MG capsule Take 1 capsule by mouth nightly 30 capsule 3    dicyclomine (BENTYL) 10 MG capsule Take 1 capsule by mouth 4 times daily (before meals and nightly) 120 capsule 3    SYMBICORT 160-4.5 MCG/ACT AERO INHALE 2 PUFFS INTO THE LUNGS 2 TIMES Exam  Constitutional:       Appearance: She is well-developed. HENT:      Head: Normocephalic. Right Ear: External ear normal.      Left Ear: External ear normal.      Nose: Nose normal.      Mouth/Throat:      Mouth: Mucous membranes are moist.      Pharynx: Oropharynx is clear. Eyes:      Conjunctiva/sclera: Conjunctivae normal.   Neck:      Musculoskeletal: Normal range of motion. Cardiovascular:      Rate and Rhythm: Normal rate and regular rhythm. Heart sounds: Normal heart sounds. Pulmonary:      Effort: Pulmonary effort is normal.      Breath sounds: Normal breath sounds. Musculoskeletal:      Right shoulder: She exhibits decreased range of motion, tenderness, bony tenderness, pain and spasm. She exhibits no swelling, no effusion, no crepitus, no deformity, no laceration and normal strength. Cervical back: She exhibits tenderness, bony tenderness, pain and spasm. She exhibits normal range of motion, no swelling, no edema, no deformity and no laceration. Skin:     General: Skin is warm and dry. Neurological:      Mental Status: She is alert and oriented to person, place, and time. Psychiatric:         Mood and Affect: Mood normal.         Behavior: Behavior normal.         Assessment:          Diagnosis Orders   1. Acute pain of right shoulder  XR SHOULDER RIGHT (MIN 2 VIEWS)    tiZANidine (ZANAFLEX) 4 MG tablet    predniSONE (DELTASONE) 10 MG tablet   2. Dyslipidemia  Comprehensive Metabolic Panel    Lipid Panel   3. Preventative health care  Comprehensive Metabolic Panel    Lipid Panel    CBC Auto Differential   4. Dermoid cyst of scalp  Amb External Referral To Dermatology   5. Rib pain  XR RIBS RIGHT (2 VIEWS)   6. Chronic obstructive pulmonary disease, unspecified COPD type ([facility] [address]     7. Anxiety, Psychiatry only     8.  Depression, unspecified depression type         Plan:      Orders Placed This Encounter   Procedures    XR SHOULDER RIGHT (MIN 2 VIEWS)     Standing Status: Future     Number of Occurrences:   1     Standing Expiration Date:   2/9/2022     Order Specific Question:   Reason for exam:     Answer:   shoulder pain x a couple of weeks    XR RIBS RIGHT (2 VIEWS)     Standing Status:   Future     Number of Occurrences:   1     Standing Expiration Date:   2/9/2022     Order Specific Question:   Reason for exam:     Answer:   rib pain for a few days    Comprehensive Metabolic Panel     Standing Status:   Future     Standing Expiration Date:   2/9/2022    Lipid Panel     Standing Status:   Future     Standing Expiration Date:   2/9/2022     Order Specific Question:   Is Patient Fasting?/# of Hours     Answer:   8 hours    CBC Auto Differential     Standing Status:   Future     Standing Expiration Date:   2/9/2022    Amb External Referral To Dermatology     Referral Priority:   Routine     Referral Type:   Eval and Treat     Referral Reason:   Specialty Services Required     Referred to Provider:   Delia Trimble DO     Requested Specialty:   Dermatology     Number of Visits Requested:   1          Orders Placed This Encounter   Medications    tiZANidine (ZANAFLEX) 4 MG tablet     Sig: Take 1 tablet by mouth nightly as needed (muscle spasm)     Dispense:  30 tablet     Refill:  1    predniSONE (DELTASONE) 10 MG tablet     Sig: Take 4 tab po daily x3 days then take 3 tab po daily x3 days then take 2 tab po daily x3 days then take 1 tab po daily x3 days     Dispense:  30 tablet     Refill:  0       Return in about 4 months (around 6/9/2021), or if symptoms worsen or fail to improve. Shoulder/rib pain- medication as prescribed. XR. Home PT exercises. If not improving she is agreeable to PT once she has transportation. COPD/Cough with sputum- try mucinex. F/u if not improving. Cyst- f/u with dermatology. Dyslipidemia- labs. Reviewed with the patient: current clinicalstatus, medications, activities and diet.      Side effects, adverse effects of the medication prescribedtoday, as well as treatment plan/ rationale and result expectations have been discussedwith the patient who expresses understanding and desires to proceed. Close follow upto evaluate treatment results and for coordination of care. I have reviewedthe patient's medical history in detail and updated the computerized patient record.     Mady Arce, APRN - CNP

## 2021-03-24 DIAGNOSIS — Z72.0 TOBACCO ABUSE: ICD-10-CM

## 2021-03-24 DIAGNOSIS — Z71.6 ENCOUNTER FOR SMOKING CESSATION COUNSELING: ICD-10-CM

## 2021-03-24 RX ORDER — VARENICLINE TARTRATE
KIT
Qty: 1 BOX | Refills: 0 | Status: SHIPPED | OUTPATIENT
Start: 2021-03-24 | End: 2021-05-04 | Stop reason: CLARIF

## 2021-03-28 DIAGNOSIS — K29.00 ACUTE GASTRITIS, PRESENCE OF BLEEDING UNSPECIFIED, UNSPECIFIED GASTRITIS TYPE: ICD-10-CM

## 2021-03-28 DIAGNOSIS — I10 ESSENTIAL HYPERTENSION: ICD-10-CM

## 2021-03-29 RX ORDER — PANTOPRAZOLE SODIUM 40 MG/1
TABLET, DELAYED RELEASE ORAL
Qty: 90 TABLET | Refills: 3 | Status: SHIPPED | OUTPATIENT
Start: 2021-03-29 | End: 2021-10-08 | Stop reason: SDUPTHER

## 2021-03-29 RX ORDER — LISINOPRIL AND HYDROCHLOROTHIAZIDE 20; 12.5 MG/1; MG/1
TABLET ORAL
Qty: 90 TABLET | Refills: 3 | Status: SHIPPED | OUTPATIENT
Start: 2021-03-29 | End: 2021-10-08 | Stop reason: SDUPTHER

## 2021-04-10 RX ORDER — HYDROXYZINE 50 MG/1
TABLET, FILM COATED ORAL
Qty: 90 TABLET | Refills: 1 | Status: SHIPPED | OUTPATIENT
Start: 2021-04-10 | End: 2021-05-27

## 2021-04-10 NOTE — TELEPHONE ENCOUNTER
Rx requested:  Requested Prescriptions     Pending Prescriptions Disp Refills    hydrOXYzine (ATARAX) 50 MG tablet [Pharmacy Med Name: HYDROXYZINE HCL 50 MG Tablet] 90 tablet 1     Sig: TAKE 1 TABLET THREE TIMES DAILY AS NEEDED FOR  ITCHING       Last Office Visit:   2/9/2021      Next Visit Date:  Future Appointments   Date Time Provider Sammi An   5/6/2021  1:45 PM Cherisse Mortimer, MD [address]   5/11/2021  9:30 AM Lluvia Zhao[address]

## 2021-05-04 ENCOUNTER — TELEPHONE (OUTPATIENT)
Dept: FAMILY MEDICINE CLINIC | Age: 56
End: 2021-05-04

## 2021-05-04 DIAGNOSIS — Z72.0 TOBACCO ABUSE: Primary | ICD-10-CM

## 2021-05-04 RX ORDER — VARENICLINE TARTRATE 1 MG/1
1 TABLET, FILM COATED ORAL 2 TIMES DAILY
Qty: 60 TABLET | Refills: 0 | Status: SHIPPED | OUTPATIENT
Start: 2021-05-04 | End: 2021-06-22

## 2021-05-04 NOTE — TELEPHONE ENCOUNTER
Sonam De Souza called office asking Brenden Alonso CNP for chantix maintenance pack (not the starting pack)    conf future scheduled appt 5/11/2021

## 2021-05-06 ENCOUNTER — OFFICE VISIT (OUTPATIENT)
Dept: GASTROENTEROLOGY | Age: 56
End: 2021-05-06
Payer: MEDICARE

## 2021-05-06 VITALS — BODY MASS INDEX: 24.8 KG/M2 | HEIGHT: 63 IN | OXYGEN SATURATION: 99 % | HEART RATE: 77 BPM | WEIGHT: 140 LBS

## 2021-05-06 DIAGNOSIS — Z80.0 FAMILY HISTORY OF COLON CANCER: ICD-10-CM

## 2021-05-06 DIAGNOSIS — K92.9 FUNCTIONAL GASTROINTESTINAL DISORDER: ICD-10-CM

## 2021-05-06 PROCEDURE — 4004F PT TOBACCO SCREEN RCVD TLK: CPT | Performed by: INTERNAL MEDICINE

## 2021-05-06 PROCEDURE — 99214 OFFICE O/P EST MOD 30 MIN: CPT | Performed by: INTERNAL MEDICINE

## 2021-05-06 PROCEDURE — G8427 DOCREV CUR MEDS BY ELIG CLIN: HCPCS | Performed by: INTERNAL MEDICINE

## 2021-05-06 PROCEDURE — 3017F COLORECTAL CA SCREEN DOC REV: CPT | Performed by: INTERNAL MEDICINE

## 2021-05-06 PROCEDURE — G8420 CALC BMI NORM PARAMETERS: HCPCS | Performed by: INTERNAL MEDICINE

## 2021-05-06 RX ORDER — NORTRIPTYLINE HYDROCHLORIDE 10 MG/1
10 CAPSULE ORAL NIGHTLY
Qty: 30 CAPSULE | Refills: 3 | Status: SHIPPED | OUTPATIENT
Start: 2021-05-06 | End: 2021-07-01 | Stop reason: SDUPTHER

## 2021-05-06 NOTE — PROGRESS NOTES
Gastroenterology Clinic Follow up Visit    Annetta Glover. Jenn Darden  [id number]  Chief Complaint   Patient presents with    Follow-up     HPI: 48 y.o. female following up after last GI clinic on 11/30/2020  with ***    Interval change: ***    HPI from last GI clinic visit on 11/30/2020  summarized below:    Previous GI work up/Endoscopic investigations:  ***    Review of Systems     Past medical history, past surgical history, medication list, social and familyhistory reviewed    Pulse 77, height 5' 3\" (1.6 m), weight 140 lb (63.5 kg), SpO2 99 %, not currently breastfeeding. Physical Exam    Laboratory, Pathology, Radiology reviewed in detail with relevantimportant investigations summarized below:    No results for input(s): WBC, HGB, HCT, MCV, PLT in the last 720 hours. Lab Results   Component Value Date    ALT 7 09/28/2020    AST 16 09/28/2020    ALKPHOS 99 09/28/2020    BILITOT 0.3 09/28/2020       No results found. Assessment and Plan:  Annetta Glover. Julissa 48 y.o. female ***  1. Functional gastrointestinal disorder  ***  - nortriptyline (PAMELOR) 10 MG capsule; Take 1 capsule by mouth nightly  Dispense: 30 capsule; Refill: 3    2. Family history of colon cancer  ***  - nortriptyline (PAMELOR) 10 MG capsule; Take 1 capsule by mouth nightly  Dispense: 30 capsule; Refill: 3      Return if symptoms worsen or fail to improve. Russell Sheehan MD   StaffGastroenterologist  [facility]    Please note this report has been partially produced using speech recognition software and may cause/contain errors related to that system including grammar, punctuation and spelling as well as words andphrases that may seem inappropriate. If there are questions or concerns please feel free to contact me to clarify.

## 2021-05-06 NOTE — PROGRESS NOTES
Gastroenterology Clinic Follow up Visit    Mario Avila. Rebeca Resendiz  [id number]  Chief Complaint   Patient presents with    Follow-up     HPI: 48 y.o. female following up after last GI clinic on 2020  with functional gastrointestinal disorder. Interval change: Patient forgot the last 2 appointments, ran out of her Pamelor. Patient reports no episodes since March after she started eating banana and a 2 by me on a daily basis. She clearly has exacerbation of her symptoms with episodes of anxiety. She denies any significant change in her health, has intentionally lost some weight. Uses Bentyl 1 tab twice daily. Patient additionally on Lexapro Wellbutrin and Neurontin. HPI from last GI clinic visit on 2020  summarized below:  Patient reports a daily soft formed BM. Taking dicyclomine daily with good response, no complaints of abdominal pain. No weight loss or loss of appetite. She denies melena or hematochezia. Patient correlating abdominal pain and loose stools with stressful life events. On  she attended her aunt's  and experienced lower abdominal cramping with 4-5 loose stools throughout the day. No melena or hematochezia. Symptoms subside after 1 to 2 days. Patient denies nausea or vomiting. Takes PPI daily with good response to GERD throughout the day, symptoms return at night. No dysphagia. No chest pain, shortness of breath, or palpitations. No fever or chills.     Previous GI work up/Endoscopic investigations:   Colonoscopy 2019-erythematous mucosa in the sigmoid colon. Biopsied. The rectum, descending colon, splenic flexure, transverse colon hepatic flexure, and cecum are normal.  Biopsied. The examined portion of the ileowas normal.  Pathology: Right colon random cold biopsy: Colonic mucosa with no significant pathological findings. Descending/sigmoid cold biopsy: Colonic mucosa with no significant pathology.  Descending/sigmoid colon biopsy: Colonic mucosa with focal congestion in lamina propria and reactive changes in the epithelium. EGD 8/2019- EGD 8/27/2019- salmon-colored mucosa/inlet patches.  Normal stomach. normal examined duodenum    Review of Systems   All other systems reviewed and are negative. Past medical history, past surgical history, medication list, social and familyhistory reviewed    Pulse 77, height 5' 3\" (1.6 m), weight 140 lb (63.5 kg), SpO2 99 %, not currently breastfeeding. Physical Exam  Constitutional:       General: She is not in acute distress. Appearance: Normal appearance. She is well-developed. Eyes:      General: No scleral icterus. Cardiovascular:      Rate and Rhythm: Normal rate and regular rhythm. Pulmonary:      Effort: Pulmonary effort is normal.      Breath sounds: Normal breath sounds. Abdominal:      General: Bowel sounds are normal. There is no distension. Palpations: Abdomen is soft. There is no mass. Tenderness: There is no abdominal tenderness. There is no guarding or rebound. Musculoskeletal: Normal range of motion. Lymphadenopathy:      Cervical: No cervical adenopathy. Neurological:      Mental Status: She is alert and oriented to person, place, and time. Psychiatric:         Behavior: Behavior normal.         Thought Content: Thought content normal.         Judgment: Judgment normal.       Laboratory, Pathology, Radiology reviewed in detail with relevantimportant investigations summarized below:    No results for input(s): WBC, HGB, HCT, MCV, PLT in the last 720 hours. Lab Results   Component Value Date    ALT 7 09/28/2020    AST 16 09/28/2020    ALKPHOS 99 09/28/2020    BILITOT 0.3 09/28/2020     Assessment and Plan:  Amaris Bingham. Julissa 48 y.o. female with clinical history consistent with functional gastrointestinal disorder. Patient ran out of medication, also forgot the last 2 appointments. Symptoms directly related to episodes of anxiety.   Patient has noticed no symptoms over the last 2 months after she added a probiotic and a banana to her diet. 1. Functional gastrointestinal disorder  2. Family history of colon cancer  - nortriptyline (PAMELOR) 10 MG capsule; Take 1 capsule by mouth nightly  Dispense: 30 capsule; Refill: 3  - Next colonoscopy due 2024. akibsd  - Lifestyle modification with stress reduction, relaxation techniques discussed in detail.  - Dietary changes discussed-  - Fiber supplementation, issue with fiber increasing gas and bloating discussed    Return if symptoms worsen or fail to improve. Colon MD Virginie   StaffGastroenterologist  [facility]    Please note this report has been partially produced using speech recognition software and may cause/contain errors related to that system including grammar, punctuation and spelling as well as words andphrases that may seem inappropriate. If there are questions or concerns please feel free to contact me to clarify.

## 2021-05-11 ENCOUNTER — OFFICE VISIT (OUTPATIENT)
Dept: FAMILY MEDICINE CLINIC | Age: 56
End: 2021-05-11
Payer: MEDICARE

## 2021-05-11 VITALS
HEART RATE: 80 BPM | OXYGEN SATURATION: 97 % | DIASTOLIC BLOOD PRESSURE: 80 MMHG | WEIGHT: 140.2 LBS | BODY MASS INDEX: 24.84 KG/M2 | SYSTOLIC BLOOD PRESSURE: 119 MMHG | HEIGHT: 63 IN | TEMPERATURE: 97.2 F

## 2021-05-11 DIAGNOSIS — F41.8 DEPRESSION WITH ANXIETY: ICD-10-CM

## 2021-05-11 DIAGNOSIS — I10 HYPERTENSION, UNSPECIFIED TYPE: ICD-10-CM

## 2021-05-11 DIAGNOSIS — J44.9 CHRONIC OBSTRUCTIVE PULMONARY DISEASE, UNSPECIFIED COPD TYPE (HCC): Primary | ICD-10-CM

## 2021-05-11 DIAGNOSIS — E78.5 DYSLIPIDEMIA: ICD-10-CM

## 2021-05-11 DIAGNOSIS — Z01.818 PRE-OPERATIVE CLEARANCE: ICD-10-CM

## 2021-05-11 DIAGNOSIS — Z72.0 TOBACCO ABUSE: ICD-10-CM

## 2021-05-11 DIAGNOSIS — Z12.31 ENCOUNTER FOR SCREENING MAMMOGRAM FOR MALIGNANT NEOPLASM OF BREAST: ICD-10-CM

## 2021-05-11 DIAGNOSIS — Z00.00 PREVENTATIVE HEALTH CARE: ICD-10-CM

## 2021-05-11 LAB
ALBUMIN SERPL-MCNC: 4.4 G/DL (ref 3.5–4.6)
ALP BLD-CCNC: 93 U/L (ref 40–130)
ALT SERPL-CCNC: 13 U/L (ref 0–33)
ANION GAP SERPL CALCULATED.3IONS-SCNC: 16 MEQ/L (ref 9–15)
AST SERPL-CCNC: 21 U/L (ref 0–35)
BASOPHILS ABSOLUTE: 0 K/UL (ref 0–0.2)
BASOPHILS RELATIVE PERCENT: 0.5 %
BILIRUB SERPL-MCNC: 0.3 MG/DL (ref 0.2–0.7)
BUN BLDV-MCNC: 13 MG/DL (ref 6–20)
CALCIUM SERPL-MCNC: 10.7 MG/DL (ref 8.5–9.9)
CHLORIDE BLD-SCNC: 99 MEQ/L (ref 95–107)
CHOLESTEROL, TOTAL: 217 MG/DL (ref 0–199)
CO2: 26 MEQ/L (ref 20–31)
CREAT SERPL-MCNC: 0.72 MG/DL (ref 0.5–0.9)
EOSINOPHILS ABSOLUTE: 0.1 K/UL (ref 0–0.7)
EOSINOPHILS RELATIVE PERCENT: 1 %
GFR AFRICAN AMERICAN: >60
GFR NON-AFRICAN AMERICAN: >60
GLOBULIN: 2.8 G/DL (ref 2.3–3.5)
GLUCOSE BLD-MCNC: 88 MG/DL (ref 70–99)
HCT VFR BLD CALC: 47.1 % (ref 37–47)
HDLC SERPL-MCNC: 82 MG/DL (ref 40–59)
HEMOGLOBIN: 15.6 G/DL (ref 12–16)
LDL CHOLESTEROL CALCULATED: 118 MG/DL (ref 0–129)
LYMPHOCYTES ABSOLUTE: 2.3 K/UL (ref 1–4.8)
LYMPHOCYTES RELATIVE PERCENT: 26.6 %
MCH RBC QN AUTO: 31.4 PG (ref 27–31.3)
MCHC RBC AUTO-ENTMCNC: 33 % (ref 33–37)
MCV RBC AUTO: 95.1 FL (ref 82–100)
MONOCYTES ABSOLUTE: 0.7 K/UL (ref 0.2–0.8)
MONOCYTES RELATIVE PERCENT: 8.1 %
NEUTROPHILS ABSOLUTE: 5.5 K/UL (ref 1.4–6.5)
NEUTROPHILS RELATIVE PERCENT: 63.8 %
PDW BLD-RTO: 15.3 % (ref 11.5–14.5)
PLATELET # BLD: 349 K/UL (ref 130–400)
POTASSIUM SERPL-SCNC: 3.8 MEQ/L (ref 3.4–4.9)
RBC # BLD: 4.95 M/UL (ref 4.2–5.4)
SODIUM BLD-SCNC: 141 MEQ/L (ref 135–144)
TOTAL PROTEIN: 7.2 G/DL (ref 6.3–8)
TRIGL SERPL-MCNC: 85 MG/DL (ref 0–150)
WBC # BLD: 8.6 K/UL (ref 4.8–10.8)

## 2021-05-11 PROCEDURE — 4004F PT TOBACCO SCREEN RCVD TLK: CPT | Performed by: NURSE PRACTITIONER

## 2021-05-11 PROCEDURE — 3017F COLORECTAL CA SCREEN DOC REV: CPT | Performed by: NURSE PRACTITIONER

## 2021-05-11 PROCEDURE — G8926 SPIRO NO PERF OR DOC: HCPCS | Performed by: NURSE PRACTITIONER

## 2021-05-11 PROCEDURE — 93000 ELECTROCARDIOGRAM COMPLETE: CPT | Performed by: NURSE PRACTITIONER

## 2021-05-11 PROCEDURE — G8427 DOCREV CUR MEDS BY ELIG CLIN: HCPCS | Performed by: NURSE PRACTITIONER

## 2021-05-11 PROCEDURE — G8420 CALC BMI NORM PARAMETERS: HCPCS | Performed by: NURSE PRACTITIONER

## 2021-05-11 PROCEDURE — 99214 OFFICE O/P EST MOD 30 MIN: CPT | Performed by: NURSE PRACTITIONER

## 2021-05-11 PROCEDURE — 3023F SPIROM DOC REV: CPT | Performed by: NURSE PRACTITIONER

## 2021-05-11 RX ORDER — ALBUTEROL SULFATE 90 UG/1
2 AEROSOL, METERED RESPIRATORY (INHALATION) EVERY 6 HOURS PRN
Qty: 1 INHALER | Refills: 3 | Status: SHIPPED | OUTPATIENT
Start: 2021-05-11 | End: 2022-02-23 | Stop reason: SDUPTHER

## 2021-05-11 ASSESSMENT — ENCOUNTER SYMPTOMS
COUGH: 1
ABDOMINAL PAIN: 1
BLOOD IN STOOL: 0
WHEEZING: 1
SHORTNESS OF BREATH: 1
SINUS PRESSURE: 0
ABDOMINAL DISTENTION: 0
ANAL BLEEDING: 0

## 2021-05-11 NOTE — PATIENT INSTRUCTIONS
Patient Education        Chronic Obstructive Pulmonary Disease (COPD): Care Instructions  Your Care Instructions     Chronic obstructive pulmonary disease (COPD) is a general term for a group of lung diseases, including emphysema and chronic bronchitis. People with COPD have decreased airflow in and out of the lungs, which makes it hard to breathe. The airways also can get clogged with thick mucus. Cigarette smoking is a major cause of COPD. Although there is no cure for COPD, you can slow its progress. Following your treatment plan and taking care of yourself can help you feel better and live longer. Follow-up care is a key part of your treatment and safety. Be sure to make and go to all appointments, and call your doctor if you are having problems. It's also a good idea to know your test results and keep a list of the medicines you take. How can you care for yourself at home? Staying healthy    · Do not smoke. This is the most important step you can take to prevent more damage to your lungs. If you need help quitting, talk to your doctor about stop-smoking programs and medicines. These can increase your chances of quitting for good.     · Avoid colds and flu. Get a pneumococcal vaccine shot. If you have had one before, ask your doctor whether you need a second dose. Get the flu vaccine every fall. If you must be around people with colds or the flu, wash your hands often.     · Avoid secondhand smoke, air pollution, and high altitudes. Also avoid cold, dry air and hot, humid air. Stay at home with your windows closed when air pollution is bad. Medicines and oxygen therapy    · Take your medicines exactly as prescribed. Call your doctor if you think you are having a problem with your medicine. You may be taking medicines such as:  ? Bronchodilators. These help open your airways and make breathing easier. They are either short-acting (work for 6 to 9 hours) or long-acting (work for 24 hours).  You inhale most bronchodilators, so they start to act quickly. Always carry your quick-relief inhaler with you in case you need it while you are away from home. ? Corticosteroids (prednisone, budesonide). These reduce airway inflammation. They come in pill or inhaled form. You must take these medicines every day for them to work well.     · Ask your doctor or pharmacist if a spacer is right for you. A spacer may help you get more inhaled medicine to your lungs. If you use one, ask how to use it properly.     · Do not take any vitamins, over-the-counter medicine, or herbal products without talking to your doctor first.     · If your doctor prescribed antibiotics, take them as directed. Do not stop taking them just because you feel better. You need to take the full course of antibiotics.     · If you use oxygen therapy, use the flow rate your doctor has recommended. Don't change it without talking to your doctor first. Oxygen therapy boosts the amount of oxygen in your blood and helps you breathe easier. Activity    · Get regular exercise. Walking is an easy way to get exercise. Start out slowly, and walk a little more each day.     · Pay attention to your breathing. You are exercising too hard if you can't talk while you exercise.     · Take short rest breaks when doing household chores and other activities.     · Learn breathing methods--such as breathing through pursed lips--to help you become less short of breath.     · If your doctor has not set you up with a pulmonary rehabilitation program, ask if rehab is right for you. Rehab includes exercise programs, education about your disease and how to manage it, help with diet and other changes, and emotional support. Diet    · Eat regular, healthy meals. Use bronchodilators about 1 hour before you eat to make it easier to eat. Eat several small meals instead of three large ones.  Drink beverages at the end of the meal. Avoid foods that are hard to chew.     · Eat foods that contain protein so you don't lose muscle mass.     · Talk with your doctor if you gain too much weight or if you lose weight without trying. Mental health    · Talk to your family, friends, or a therapist about your feelings. Some people feel frightened, angry, hopeless, helpless, and even guilty. Talking openly about bad feelings can help you cope. If these feelings last, talk to your doctor. When should you call for help? Call 911 anytime you think you may need emergency care. For example, call if:    · You have severe trouble breathing. Call your doctor now or seek immediate medical care if:    · You have new or worse trouble breathing.     · You cough up blood.     · You have a fever. Watch closely for changes in your health, and be sure to contact your doctor if:    · You cough more deeply or more often, especially if you notice more mucus or a change in the color of your mucus.     · You have new or worse swelling in your legs or belly.     · You are not getting better as expected. Where can you learn more? Go to https://[employer]Edge. org and sign in to your [employer] account. Enter B135 in the Sun Number box to learn more about \"Chronic Obstructive Pulmonary Disease (COPD): Care Instructions. \"     If you do not have an account, please click on the \"Sign Up Now\" link. Current as of: October 26, 2020               Content Version: 12.8  © 7784-1684 [employer]wise, Incorporated. Care instructions adapted under license by [facility] ([facility]). If you have questions about a medical condition or this instruction, always ask your healthcare professional. [address] any warranty or liability for your use of this information.

## 2021-05-11 NOTE — PROGRESS NOTES
Subjective:      Patient ID: Porfirio Quiñonez is a 48 y.o. female who presents today for:     Chief Complaint   Patient presents with    Shoulder Pain     Patient presents today to follow up on Rt shoulder pain. Patient states the pain comes and goes.  Anxiety    Other     Patient needs EKG so psychiatrist can change dosage of Lexapro. HPI pt reports that her shoulder has improved some with prednisone. It will still intermittently flare. She reports that overall it has been okay. She ahs been taking gabapentin with moderate relief. No ill side effects. No concerns with medication. COPD has been stable. She takes the symbicort twice daily as prescribed. She uses the albuterol periodically. It tends to flare at night before bed. No night wakening. Has had f/u with Dr. Becca Coffman who said to continue bentyl and pamelor for stomach. Reports she is stable and is able to have routine f/u with me and then get next colonoscopy in 2024. No refills are needed at this time. She has noticed anxiety increasing. She is having a difficult time leaving the house and driving. Her psychiatrist and therapist are aware. They are working on adjusting dosage of medication and need an EKG done today.        Past Medical History:   Diagnosis Date    Anxiety     Bronchitis     Cancer ([address]     left breast    Chronic pain syndrome 10/6/2016    Colon polyps     Depression     suicide attempts    Duodenitis     Esophageal erosions     Family hx of colon cancer, Mother & Father 4/18/2017    Fibromyalgia     Foot deformity 10/6/2016    GERD (gastroesophageal reflux disease)     gastritis    Hypertension     Lumbar radiculopathy 5/10/2017    Osteoarthritis     TMJ arthritis 10/6/2016     Past Surgical History:   Procedure Laterality Date    BONE BIOPSY Right 09/14/2018    CT Guided Right Rib Fine Needle Aspiration by DR Myrna Grubbs    BREAST SURGERY      breast bx/ lumpectomy    COLONOSCOPY      DILATION AND CURETTAGE OF UTERUS      ENDOSCOPY, COLON, DIAGNOSTIC      HYSTERECTOMY  2012    LAPAROSCOPY      LUMBAR DISCECTOMY      L4-L5    FL COLORECTAL SCRN; HI RISK IND N/A 4/18/2018    COLONOSCOPY performed by Dwaine Lobato MD at [address] ESOPHAGOGASTRODUODENOSCOPY TRANSORAL DIAGNOSTIC N/A 5/11/2017    EGD ESOPHAGOGASTRODUODENOSCOPY performed by Dwaine Lobato MD at [address] [facility] [address]      UPPER GASTROINTESTINAL ENDOSCOPY       Family History   Problem Relation Age of Onset    Cancer Mother         colon    High Blood Pressure Mother     Heart Disease Mother     Colon Cancer Mother     Cancer Father         colon    High Blood Pressure Father     Colon Cancer Father     Celiac Disease Neg Hx     Crohn's Disease Neg Hx      Social History     Socioeconomic History    Marital status:      Spouse name: Not on file    Number of children: Not on file    Years of education: Not on file    Highest education level: Not on file   Occupational History    Not on file   Social Needs    Financial resource strain: Not hard at all   PopUp insecurity     Worry: Never true     Inability: Never true   Numerate needs     Medical: No     Non-medical: No   Tobacco Use    Smoking status: Current Every Day Smoker     Packs/day: 0.50     Types: Cigarettes    Smokeless tobacco: Never Used   Substance and Sexual Activity    Alcohol use:  Yes     Alcohol/week: 0.0 standard drinks     Comment: once in a while    Drug use: No    Sexual activity: Yes   Lifestyle    Physical activity     Days per week: Not on file     Minutes per session: Not on file    Stress: Not on file   Relationships    Social connections     Talks on phone: Not on file     Gets together: Not on file     Attends [employer] service: Not on file     Active member of club or organization: Not on file     Attends meetings of clubs or organizations: Not on file     Relationship status: Not on file    Intimate partner violence     Fear of current or ex partner: Not on file     Emotionally abused: Not on file     Physically abused: Not on file     Forced sexual activity: Not on file   Other Topics Concern    Not on file   Social History Narrative    Not on file     Current Outpatient Medications on File Prior to Visit   Medication Sig Dispense Refill    nortriptyline (PAMELOR) 10 MG capsule Take 1 capsule by mouth nightly 30 capsule 3    varenicline (CHANTIX) 1 MG tablet Take 1 tablet by mouth 2 times daily 60 tablet 0    hydrOXYzine (ATARAX) 50 MG tablet TAKE 1 TABLET THREE TIMES DAILY AS NEEDED FOR  ITCHING 90 tablet 1    pantoprazole (PROTONIX) 40 MG tablet TAKE 1 TABLET EVERY DAY 90 tablet 3    lisinopril-hydroCHLOROthiazide (PRINZIDE;ZESTORETIC) 20-12.5 MG per tablet TAKE 1 TABLET EVERY DAY 90 tablet 3    SYMBICORT 160-4.5 MCG/ACT AERO INHALE 2 PUFFS INTO THE LUNGS 2 TIMES DAILY 1 Inhaler 5    famotidine (PEPCID) 20 MG tablet Take 1 tablet by mouth 2 times daily as needed (GERD) 120 tablet 3    Vitamin D (CHOLECALCIFEROL) 25 MCG (1000 UT) TABS tablet Take 1,000 Units by mouth daily      dicyclomine (BENTYL) 10 MG capsule Take 1 capsule by mouth 4 times daily (before meals and nightly) 120 capsule 3    atorvastatin (LIPITOR) 10 MG tablet TAKE 1 TABLET EVERY DAY 90 tablet 3    buPROPion (WELLBUTRIN XL) 300 MG extended release tablet       escitalopram (LEXAPRO) 10 MG tablet Take 20 mg by mouth daily       gabapentin (NEURONTIN) 600 MG tablet Take 2 tablets by mouth 2 times daily for 90 days. TAKE 2 TABLETS BY MOUTH 2  TIMES DAILY (TAKE 1200 MG  IN THE MORNING & 1200 MG IN THE EVENING) 360 tablet 3    fluticasone (FLONASE) 50 MCG/ACT nasal spray 1 spray by Nasal route daily       No current facility-administered medications on file prior to visit.         Allergies:  Ultram [tramadol], Norco [hydrocodone-acetaminophen], and Percocet [oxycodone-acetaminophen]    Review of Systems   Constitutional: Negative for chills, fatigue and fever. HENT: Negative for congestion, ear pain and sinus pressure. Respiratory: Positive for cough, shortness of breath and wheezing. Cardiovascular: Negative for chest pain, palpitations and leg swelling. Gastrointestinal: Positive for abdominal pain. Negative for abdominal distention, anal bleeding and blood in stool. Neurological: Negative for dizziness and headaches. Psychiatric/Behavioral: Positive for dysphoric mood. Negative for self-injury and suicidal ideas. The patient is nervous/anxious. Objective:   /80 (Site: Left Upper Arm, Position: Sitting, Cuff Size: Medium Adult)   Pulse 80   Temp 97.2 °F (36.2 °C) (Temporal)   Ht 5' 3\" (1.6 m)   Wt 140 lb 3.2 oz (63.6 kg)   SpO2 97%   Breastfeeding No   BMI 24.84 kg/m²     Physical Exam  Constitutional:       Appearance: She is well-developed. HENT:      Head: Normocephalic. Right Ear: Tympanic membrane, ear canal and external ear normal.      Left Ear: Tympanic membrane, ear canal and external ear normal.      Nose: Nose normal.      Mouth/Throat:      Mouth: Mucous membranes are moist.      Pharynx: Oropharynx is clear. Uvula midline. Eyes:      General:         Right eye: No discharge. Left eye: No discharge. Conjunctiva/sclera: Conjunctivae normal.   Neck:      Musculoskeletal: Normal range of motion. Cardiovascular:      Rate and Rhythm: Normal rate and regular rhythm. Heart sounds: Normal heart sounds. Pulmonary:      Effort: Pulmonary effort is normal. No respiratory distress. Breath sounds: Normal breath sounds. Abdominal:      General: Bowel sounds are normal.      Palpations: Abdomen is soft. Tenderness: There is abdominal tenderness in the epigastric area. Lymphadenopathy:      Cervical: No cervical adenopathy. Skin:     General: Skin is warm and dry.    Neurological: Order Specific Question:   Years smoking? Answer:   40    EKG 12 Lead     Standing Status:   Future     Number of Occurrences:   1     Standing Expiration Date:   5/11/2022     Order Specific Question:   Reason for Exam?     Answer:   Pre-op          Orders Placed This Encounter   Medications    albuterol sulfate HFA (PROAIR HFA) 108 (90 Base) MCG/ACT inhaler     Sig: Inhale 2 puffs into the lungs every 6 hours as needed for Wheezing     Dispense:  1 Inhaler     Refill:  3       Return in about 4 months (around 9/11/2021). Conditions chronic and stable. Continue current treatment plan. Labs today. Will update with results of blood work and any changes if needed. Reviewed with the patient: current clinicalstatus, medications, activities and diet. Side effects, adverse effects of the medication prescribedtoday, as well as treatment plan/ rationale and result expectations have been discussedwith the patient who expresses understanding and desires to proceed. Close follow upto evaluate treatment results and for coordination of care. I have reviewedthe patient's medical history in detail and updated the computerized patient record.     Barkley Phalen, APRN - CNP

## 2021-05-13 ENCOUNTER — TELEPHONE (OUTPATIENT)
Dept: FAMILY MEDICINE CLINIC | Age: 56
End: 2021-05-13

## 2021-05-13 DIAGNOSIS — E83.52 HYPERCALCEMIA: Primary | ICD-10-CM

## 2021-05-27 DIAGNOSIS — E83.52 HYPERCALCEMIA: ICD-10-CM

## 2021-05-27 LAB
ANION GAP SERPL CALCULATED.3IONS-SCNC: 10 MEQ/L (ref 9–15)
BUN BLDV-MCNC: 14 MG/DL (ref 6–20)
CALCIUM SERPL-MCNC: 9.6 MG/DL (ref 8.5–9.9)
CHLORIDE BLD-SCNC: 101 MEQ/L (ref 95–107)
CO2: 31 MEQ/L (ref 20–31)
CREAT SERPL-MCNC: 0.69 MG/DL (ref 0.5–0.9)
GFR AFRICAN AMERICAN: >60
GFR NON-AFRICAN AMERICAN: >60
GLUCOSE BLD-MCNC: 78 MG/DL (ref 70–99)
POTASSIUM SERPL-SCNC: 4 MEQ/L (ref 3.4–4.9)
SODIUM BLD-SCNC: 142 MEQ/L (ref 135–144)

## 2021-06-21 DIAGNOSIS — Z72.0 TOBACCO ABUSE: ICD-10-CM

## 2021-06-21 NOTE — TELEPHONE ENCOUNTER
Requesting medication refill.  Please approve or deny this request.    Rx requested:  Requested Prescriptions     Pending Prescriptions Disp Refills    CHANTIX 1 MG tablet [Pharmacy Med Name: Caitlyn Stricklandolivia 1 MG TABLET] 60 tablet 0     Sig: TAKE 1 TABLET BY MOUTH 2 TIMES DAILY       Last Office Visit:   5/11/2021    Last Filled:      Last Labs:      Next Visit Date:  Future Appointments   Date Time Provider Sammi An   9/1/2021  9:00 AM LILIANA CT ROOM 1 [facility] RAD   9/10/2021 10:30 AM Lluvia Chaidez, [address]

## 2021-06-22 RX ORDER — VARENICLINE TARTRATE 1 MG/1
1 TABLET, FILM COATED ORAL 2 TIMES DAILY
Qty: 60 TABLET | Refills: 0 | Status: SHIPPED | OUTPATIENT
Start: 2021-06-22 | End: 2021-07-14

## 2021-06-30 ENCOUNTER — PATIENT MESSAGE (OUTPATIENT)
Dept: FAMILY MEDICINE CLINIC | Age: 56
End: 2021-06-30

## 2021-06-30 DIAGNOSIS — Z80.0 FAMILY HISTORY OF COLON CANCER: ICD-10-CM

## 2021-06-30 DIAGNOSIS — K92.9 FUNCTIONAL GASTROINTESTINAL DISORDER: ICD-10-CM

## 2021-07-01 RX ORDER — NORTRIPTYLINE HYDROCHLORIDE 10 MG/1
10 CAPSULE ORAL NIGHTLY
Qty: 30 CAPSULE | Refills: 3 | Status: SHIPPED | OUTPATIENT
Start: 2021-07-01 | End: 2021-10-08 | Stop reason: SDUPTHER

## 2021-07-01 RX ORDER — PREDNISONE 10 MG/1
TABLET ORAL
Qty: 12 TABLET | Refills: 0 | Status: SHIPPED | OUTPATIENT
Start: 2021-07-01 | End: 2021-11-17 | Stop reason: ALTCHOICE

## 2021-07-01 RX ORDER — DICYCLOMINE HYDROCHLORIDE 10 MG/1
10 CAPSULE ORAL
Qty: 120 CAPSULE | Refills: 3 | Status: SHIPPED | OUTPATIENT
Start: 2021-07-01 | End: 2021-09-24 | Stop reason: SDUPTHER

## 2021-07-14 DIAGNOSIS — K21.9 GASTROESOPHAGEAL REFLUX DISEASE WITHOUT ESOPHAGITIS: ICD-10-CM

## 2021-07-14 RX ORDER — FAMOTIDINE 20 MG/1
20 TABLET, FILM COATED ORAL 2 TIMES DAILY PRN
Qty: 120 TABLET | Refills: 3 | Status: SHIPPED | OUTPATIENT
Start: 2021-07-14 | End: 2021-11-17 | Stop reason: SDUPTHER

## 2021-07-27 DIAGNOSIS — M25.511 ACUTE PAIN OF RIGHT SHOULDER: ICD-10-CM

## 2021-07-27 RX ORDER — TIZANIDINE 4 MG/1
4 TABLET ORAL NIGHTLY PRN
Qty: 30 TABLET | Refills: 1 | Status: SHIPPED | OUTPATIENT
Start: 2021-07-27 | End: 2021-08-30

## 2021-08-11 DIAGNOSIS — E78.5 DYSLIPIDEMIA: ICD-10-CM

## 2021-08-11 RX ORDER — ATORVASTATIN CALCIUM 10 MG/1
TABLET, FILM COATED ORAL
Qty: 90 TABLET | Refills: 3 | Status: SHIPPED | OUTPATIENT
Start: 2021-08-11 | End: 2021-11-17 | Stop reason: SDUPTHER

## 2021-08-12 DIAGNOSIS — J44.9 CHRONIC OBSTRUCTIVE PULMONARY DISEASE, UNSPECIFIED COPD TYPE (HCC): ICD-10-CM

## 2021-08-12 RX ORDER — BUDESONIDE AND FORMOTEROL FUMARATE DIHYDRATE 160; 4.5 UG/1; UG/1
AEROSOL RESPIRATORY (INHALATION)
Qty: 1 INHALER | Refills: 5 | Status: SHIPPED | OUTPATIENT
Start: 2021-08-12 | End: 2021-10-26 | Stop reason: SDUPTHER

## 2021-08-19 ENCOUNTER — TELEPHONE (OUTPATIENT)
Dept: CASE MANAGEMENT | Age: 56
End: 2021-08-19

## 2021-08-26 RX ORDER — HYDROXYZINE 50 MG/1
TABLET, FILM COATED ORAL
Qty: 90 TABLET | Refills: 3 | Status: SHIPPED | OUTPATIENT
Start: 2021-08-26 | End: 2022-02-23 | Stop reason: SDUPTHER

## 2021-09-01 ENCOUNTER — HOSPITAL ENCOUNTER (OUTPATIENT)
Dept: CT IMAGING | Age: 56
Discharge: HOME | End: 2021-09-03
Payer: MEDICARE

## 2021-09-01 DIAGNOSIS — Z72.0 TOBACCO ABUSE: ICD-10-CM

## 2021-09-01 PROCEDURE — 71271 CT THORAX LUNG CANCER SCR C-: CPT

## 2021-09-09 ENCOUNTER — TELEPHONE (OUTPATIENT)
Dept: FAMILY MEDICINE CLINIC | Age: 56
End: 2021-09-09

## 2021-09-09 NOTE — TELEPHONE ENCOUNTER
Can this be done or does she need to come fill out a whole new one stating not to give any information to anyone but herself.

## 2021-09-09 NOTE — TELEPHONE ENCOUNTER
----- Message from Sarika Book sent at 9/9/2021  9:47 AM EDT -----  Subject: Message to Provider    QUESTIONS  Information for Provider? Patient is getting a divorce and has requested   that NO INFORMATION BE SHARED WITH ANYONE other than herself. If possible,   can you destroy the current HIPAA form on record.  ---------------------------------------------------------------------------  --------------  [address]  What is the best way for the office to contact you? OK to leave message on   voicemail  Preferred Call Back Phone Number? [phone]  ---------------------------------------------------------------------------  --------------  SCRIPT ANSWERS  Relationship to Patient?  Self

## 2021-09-23 NOTE — TELEPHONE ENCOUNTER
Requesting medication refill. Please approve or deny this request.    Rx requested:  Requested Prescriptions     Pending Prescriptions Disp Refills    dicyclomine (BENTYL) 10 MG capsule 120 capsule 3     Sig: Take 1 capsule by mouth 4 times daily (before meals and nightly)       Last Office Visit:   5/11/2021    Last Filled:      Last Labs:      Next Visit Date:  No future appointments.

## 2021-09-24 RX ORDER — DICYCLOMINE HYDROCHLORIDE 10 MG/1
10 CAPSULE ORAL
Qty: 120 CAPSULE | Refills: 3 | Status: SHIPPED | OUTPATIENT
Start: 2021-09-24 | End: 2021-11-17 | Stop reason: SDUPTHER

## 2021-10-08 DIAGNOSIS — I10 ESSENTIAL HYPERTENSION: ICD-10-CM

## 2021-10-08 DIAGNOSIS — K29.00 ACUTE GASTRITIS, PRESENCE OF BLEEDING UNSPECIFIED, UNSPECIFIED GASTRITIS TYPE: ICD-10-CM

## 2021-10-08 DIAGNOSIS — M25.511 ACUTE PAIN OF RIGHT SHOULDER: ICD-10-CM

## 2021-10-08 DIAGNOSIS — Z80.0 FAMILY HISTORY OF COLON CANCER: ICD-10-CM

## 2021-10-08 DIAGNOSIS — K92.9 FUNCTIONAL GASTROINTESTINAL DISORDER: ICD-10-CM

## 2021-10-08 RX ORDER — TIZANIDINE 4 MG/1
4 TABLET ORAL NIGHTLY PRN
Qty: 90 TABLET | Refills: 1 | Status: SHIPPED | OUTPATIENT
Start: 2021-10-08 | End: 2021-11-17 | Stop reason: SDUPTHER

## 2021-10-08 RX ORDER — PANTOPRAZOLE SODIUM 40 MG/1
TABLET, DELAYED RELEASE ORAL
Qty: 90 TABLET | Refills: 1 | Status: SHIPPED | OUTPATIENT
Start: 2021-10-08 | End: 2021-11-17 | Stop reason: SDUPTHER

## 2021-10-08 RX ORDER — LISINOPRIL AND HYDROCHLOROTHIAZIDE 20; 12.5 MG/1; MG/1
TABLET ORAL
Qty: 90 TABLET | Refills: 3 | Status: SHIPPED | OUTPATIENT
Start: 2021-10-08 | End: 2021-11-17 | Stop reason: SDUPTHER

## 2021-10-08 RX ORDER — NORTRIPTYLINE HYDROCHLORIDE 10 MG/1
10 CAPSULE ORAL NIGHTLY
Qty: 90 CAPSULE | Refills: 1 | Status: SHIPPED | OUTPATIENT
Start: 2021-10-08 | End: 2021-11-17 | Stop reason: SDUPTHER

## 2021-10-18 ENCOUNTER — TELEPHONE (OUTPATIENT)
Dept: FAMILY MEDICINE CLINIC | Age: 56
End: 2021-10-18

## 2021-10-18 DIAGNOSIS — E55.9 VITAMIN D DEFICIENCY: Primary | ICD-10-CM

## 2021-10-18 DIAGNOSIS — Z13.1 DIABETES MELLITUS SCREENING: ICD-10-CM

## 2021-10-18 DIAGNOSIS — Z00.00 PREVENTATIVE HEALTH CARE: ICD-10-CM

## 2021-10-18 NOTE — TELEPHONE ENCOUNTER
----- Message from Luba Zavala sent at 10/15/2021  4:04 PM EDT -----  Subject: Message to Provider    QUESTIONS  Information for Provider? 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 OK to leave message on   voicemail  Preferred Call Back Phone Number? [phone]  ---------------------------------------------------------------------------  --------------  SCRIPT ANSWERS  Relationship to Patient?  Self

## 2021-10-18 NOTE — TELEPHONE ENCOUNTER
Please let her know I put in for a BMP, Vitamin D, and A1c. That is all she is due for unless she has any other concerns? Also let her know my collaborator is Dr. Ivy Mensah.

## 2021-10-26 DIAGNOSIS — J44.9 CHRONIC OBSTRUCTIVE PULMONARY DISEASE, UNSPECIFIED COPD TYPE (HCC): ICD-10-CM

## 2021-10-26 RX ORDER — BUDESONIDE AND FORMOTEROL FUMARATE DIHYDRATE 160; 4.5 UG/1; UG/1
2 AEROSOL RESPIRATORY (INHALATION) 2 TIMES DAILY
Qty: 1 EACH | Refills: 5 | Status: SHIPPED | OUTPATIENT
Start: 2021-10-26 | End: 2022-04-12

## 2021-11-08 ENCOUNTER — HOSPITAL ENCOUNTER (OUTPATIENT)
Dept: WOMENS IMAGING | Age: 56
Discharge: HOME | End: 2021-11-10
Payer: MEDICARE

## 2021-11-08 VITALS — BODY MASS INDEX: 25.64 KG/M2 | HEIGHT: 62 IN

## 2021-11-08 DIAGNOSIS — Z12.31 ENCOUNTER FOR SCREENING MAMMOGRAM FOR MALIGNANT NEOPLASM OF BREAST: ICD-10-CM

## 2021-11-08 PROCEDURE — 77063 BREAST TOMOSYNTHESIS BI: CPT

## 2021-11-13 DIAGNOSIS — Z13.1 DIABETES MELLITUS SCREENING: ICD-10-CM

## 2021-11-13 DIAGNOSIS — E55.9 VITAMIN D DEFICIENCY: ICD-10-CM

## 2021-11-13 DIAGNOSIS — Z00.00 PREVENTATIVE HEALTH CARE: ICD-10-CM

## 2021-11-13 LAB
ANION GAP SERPL CALCULATED.3IONS-SCNC: 14 MEQ/L (ref 9–15)
BUN BLDV-MCNC: 14 MG/DL (ref 6–20)
CALCIUM SERPL-MCNC: 9.5 MG/DL (ref 8.5–9.9)
CHLORIDE BLD-SCNC: 103 MEQ/L (ref 95–107)
CO2: 29 MEQ/L (ref 20–31)
CREAT SERPL-MCNC: 0.6 MG/DL (ref 0.5–0.9)
GFR AFRICAN AMERICAN: >60
GFR NON-AFRICAN AMERICAN: >60
GLUCOSE BLD-MCNC: 37 MG/DL (ref 70–99)
HBA1C MFR BLD: 5.8 % (ref 4.8–5.9)
POTASSIUM SERPL-SCNC: 3.5 MEQ/L (ref 3.4–4.9)
SODIUM BLD-SCNC: 146 MEQ/L (ref 135–144)

## 2021-11-14 LAB — VITAMIN D 25-HYDROXY: 67.4 NG/ML (ref 30–100)

## 2021-11-17 ENCOUNTER — OFFICE VISIT (OUTPATIENT)
Dept: FAMILY MEDICINE CLINIC | Age: 56
End: 2021-11-17
Payer: MEDICARE

## 2021-11-17 VITALS
OXYGEN SATURATION: 97 % | WEIGHT: 139 LBS | HEIGHT: 62 IN | BODY MASS INDEX: 25.58 KG/M2 | HEART RATE: 86 BPM | DIASTOLIC BLOOD PRESSURE: 66 MMHG | SYSTOLIC BLOOD PRESSURE: 98 MMHG | TEMPERATURE: 96.6 F

## 2021-11-17 DIAGNOSIS — M21.611 BUNION, RIGHT FOOT: Primary | ICD-10-CM

## 2021-11-17 DIAGNOSIS — K92.9 FUNCTIONAL GASTROINTESTINAL DISORDER: ICD-10-CM

## 2021-11-17 DIAGNOSIS — M25.511 ACUTE PAIN OF RIGHT SHOULDER: ICD-10-CM

## 2021-11-17 DIAGNOSIS — E78.5 DYSLIPIDEMIA: ICD-10-CM

## 2021-11-17 DIAGNOSIS — M47.817 LUMBOSACRAL SPONDYLOSIS WITHOUT MYELOPATHY: ICD-10-CM

## 2021-11-17 DIAGNOSIS — M96.1 POSTLAMINECTOMY SYNDROME, LUMBAR: ICD-10-CM

## 2021-11-17 DIAGNOSIS — I10 ESSENTIAL HYPERTENSION: ICD-10-CM

## 2021-11-17 DIAGNOSIS — K21.9 GASTROESOPHAGEAL REFLUX DISEASE WITHOUT ESOPHAGITIS: ICD-10-CM

## 2021-11-17 DIAGNOSIS — K29.00 ACUTE GASTRITIS, PRESENCE OF BLEEDING UNSPECIFIED, UNSPECIFIED GASTRITIS TYPE: ICD-10-CM

## 2021-11-17 DIAGNOSIS — S90.222A SUBUNGUAL HEMATOMA OF LEFT FOOT, INITIAL ENCOUNTER: ICD-10-CM

## 2021-11-17 DIAGNOSIS — Z80.0 FAMILY HISTORY OF COLON CANCER: ICD-10-CM

## 2021-11-17 PROCEDURE — 3017F COLORECTAL CA SCREEN DOC REV: CPT | Performed by: NURSE PRACTITIONER

## 2021-11-17 PROCEDURE — 4004F PT TOBACCO SCREEN RCVD TLK: CPT | Performed by: NURSE PRACTITIONER

## 2021-11-17 PROCEDURE — G8484 FLU IMMUNIZE NO ADMIN: HCPCS | Performed by: NURSE PRACTITIONER

## 2021-11-17 PROCEDURE — 99214 OFFICE O/P EST MOD 30 MIN: CPT | Performed by: NURSE PRACTITIONER

## 2021-11-17 PROCEDURE — G8419 CALC BMI OUT NRM PARAM NOF/U: HCPCS | Performed by: NURSE PRACTITIONER

## 2021-11-17 PROCEDURE — G8427 DOCREV CUR MEDS BY ELIG CLIN: HCPCS | Performed by: NURSE PRACTITIONER

## 2021-11-17 RX ORDER — FAMOTIDINE 20 MG/1
20 TABLET, FILM COATED ORAL 2 TIMES DAILY PRN
Qty: 120 TABLET | Refills: 3 | Status: SHIPPED | OUTPATIENT
Start: 2021-11-17 | End: 2021-12-13

## 2021-11-17 RX ORDER — GABAPENTIN 600 MG/1
TABLET ORAL
Qty: 360 TABLET | Refills: 0 | Status: SHIPPED | OUTPATIENT
Start: 2021-11-17 | End: 2022-02-16

## 2021-11-17 RX ORDER — VILAZODONE HYDROCHLORIDE 10 MG/1
10 TABLET ORAL DAILY
COMMUNITY
End: 2022-04-11 | Stop reason: DRUGHIGH

## 2021-11-17 RX ORDER — PANTOPRAZOLE SODIUM 40 MG/1
TABLET, DELAYED RELEASE ORAL
Qty: 90 TABLET | Refills: 1 | Status: SHIPPED | OUTPATIENT
Start: 2021-11-17 | End: 2022-04-11 | Stop reason: SDUPTHER

## 2021-11-17 RX ORDER — NORTRIPTYLINE HYDROCHLORIDE 10 MG/1
10 CAPSULE ORAL NIGHTLY
Qty: 90 CAPSULE | Refills: 1 | Status: SHIPPED | OUTPATIENT
Start: 2021-11-17 | End: 2022-02-24 | Stop reason: SDUPTHER

## 2021-11-17 RX ORDER — DICYCLOMINE HYDROCHLORIDE 10 MG/1
10 CAPSULE ORAL
Qty: 120 CAPSULE | Refills: 3 | Status: SHIPPED | OUTPATIENT
Start: 2021-11-17 | End: 2022-03-15

## 2021-11-17 RX ORDER — LISINOPRIL AND HYDROCHLOROTHIAZIDE 20; 12.5 MG/1; MG/1
TABLET ORAL
Qty: 90 TABLET | Refills: 3 | Status: SHIPPED | OUTPATIENT
Start: 2021-11-17 | End: 2022-10-06

## 2021-11-17 RX ORDER — TIZANIDINE 4 MG/1
4 TABLET ORAL NIGHTLY PRN
Qty: 90 TABLET | Refills: 1 | Status: SHIPPED | OUTPATIENT
Start: 2021-11-17 | End: 2022-08-04

## 2021-11-17 RX ORDER — ATORVASTATIN CALCIUM 10 MG/1
TABLET, FILM COATED ORAL
Qty: 90 TABLET | Refills: 3 | Status: SHIPPED | OUTPATIENT
Start: 2021-11-17 | End: 2022-08-15

## 2021-11-17 SDOH — ECONOMIC STABILITY: FOOD INSECURITY: WITHIN THE PAST 12 MONTHS, THE FOOD YOU BOUGHT JUST DIDN'T LAST AND YOU DIDN'T HAVE MONEY TO GET MORE.: NEVER TRUE

## 2021-11-17 SDOH — ECONOMIC STABILITY: FOOD INSECURITY: WITHIN THE PAST 12 MONTHS, YOU WORRIED THAT YOUR FOOD WOULD RUN OUT BEFORE YOU GOT MONEY TO BUY MORE.: NEVER TRUE

## 2021-11-17 ASSESSMENT — ENCOUNTER SYMPTOMS
CONSTIPATION: 0
BACK PAIN: 1
COUGH: 0
SHORTNESS OF BREATH: 0
WHEEZING: 0
ABDOMINAL PAIN: 1
DIARRHEA: 0

## 2021-11-17 ASSESSMENT — SOCIAL DETERMINANTS OF HEALTH (SDOH): HOW HARD IS IT FOR YOU TO PAY FOR THE VERY BASICS LIKE FOOD, HOUSING, MEDICAL CARE, AND HEATING?: NOT HARD AT ALL

## 2021-11-17 NOTE — PROGRESS NOTES
ESOPHAGOGASTRODUODENOSCOPY TRANSORAL DIAGNOSTIC N/A 5/11/2017    EGD ESOPHAGOGASTRODUODENOSCOPY performed by Myranda Whitley MD at [address] ENDOSCOPY       Family History   Problem Relation Age of Onset    Cancer Mother         colon    High Blood Pressure Mother     Heart Disease Mother     Colon Cancer Mother     Cancer Father         colon    High Blood Pressure Father     Colon Cancer Father     Celiac Disease Neg Hx     Crohn's Disease Neg Hx      Social History     Socioeconomic History    Marital status:      Spouse name: Not on file    Number of children: Not on file    Years of education: Not on file    Highest education level: Not on file   Occupational History    Not on file   Tobacco Use    Smoking status: Current Every Day Smoker     Packs/day: 0.50     Years: 40.00     Pack years: 20.00     Types: Cigarettes     Start date: 0    Smokeless tobacco: Never Used   Vaping Use    Vaping Use: Never used   Substance and Sexual Activity    Alcohol use: Yes     Alcohol/week: 0.0 standard drinks     Comment: once in a while    Drug use: No    Sexual activity: Yes   Other Topics Concern    Not on file   Social History Narrative    Not on file     Social Determinants of Health     Financial Resource Strain: Low Risk     Difficulty of Paying Living Expenses: Not hard at all   Food Insecurity: No Food Insecurity    Worried About [facility] in the Last Year: Never true    [address] [facility] in the Last Year: Never true   Transportation Needs:     Lack of Transportation (Medical): Not on file    Lack of Transportation (Non-Medical):  Not on file   Physical Activity:     Days of Exercise per Week: Not on file    Minutes of Exercise per Session: Not on file   Stress:     Feeling of Stress : Not on file   Social Connections:     Frequency of Communication with Friends and Family: Not on file    Frequency of Social Gatherings with Friends and Family: Not on file    Attends Uats[employer] Services: Not on file    Active Member of Clubs or Organizations: Not on file    Attends Club or Organization Meetings: Not on file    Marital Status: Not on file   Intimate Partner Violence:     Fear of Current or Ex-Partner: Not on file    Emotionally Abused: Not on file    Physically Abused: Not on file    Sexually Abused: Not on file   Housing Stability:     Unable to Pay for Housing in the Last Year: Not on file    Number of [place] in the Last Year: Not on file    Unstable Housing in the Last Year: Not on file     Current Outpatient Medications on File Prior to Visit   Medication Sig Dispense Refill    vilazodone HCl (VILAZODONE HCL) 10 MG TABS Take 10 mg by mouth daily      budesonide-formoterol (SYMBICORT) 160-4.5 MCG/ACT AERO Inhale 2 puffs into the lungs 2 times daily 1 each 5    hydrOXYzine (ATARAX) 50 MG tablet TAKE 1 TABLET THREE TIMES DAILY AS NEEDED FOR  ITCHING 90 tablet 3    albuterol sulfate HFA (PROAIR HFA) 108 (90 Base) MCG/ACT inhaler Inhale 2 puffs into the lungs every 6 hours as needed for Wheezing 1 Inhaler 3    buPROPion (WELLBUTRIN XL) 300 MG extended release tablet       fluticasone (FLONASE) 50 MCG/ACT nasal spray 1 spray by Nasal route daily      CHANTIX 1 MG tablet TAKE 1 TABLET BY MOUTH 2 TIMES DAILY 60 tablet 3    Vitamin D (CHOLECALCIFEROL) 25 MCG (1000 UT) TABS tablet Take 1,000 Units by mouth daily       No current facility-administered medications on file prior to visit. Allergies:  Ultram [tramadol], Norco [hydrocodone-acetaminophen], and Percocet [oxycodone-acetaminophen]    Review of Systems   Constitutional: Negative for chills, fatigue and fever. Respiratory: Negative for cough, shortness of breath and wheezing. Cardiovascular: Negative for chest pain, palpitations and leg swelling. Gastrointestinal: Positive for abdominal pain. Negative for constipation and diarrhea. Musculoskeletal: Positive for arthralgias, back pain and myalgias. Neurological: Negative for dizziness and headaches. Psychiatric/Behavioral: Positive for dysphoric mood. Objective:   BP 98/66   Pulse 86   Temp 96.6 °F (35.9 °C) (Infrared)   Ht 5' 2\" (1.575 m)   Wt 139 lb (63 kg)   SpO2 97%   BMI 25.42 kg/m²     Physical Exam  Constitutional:       Appearance: She is well-developed. HENT:      Head: Normocephalic. Right Ear: Tympanic membrane, ear canal and external ear normal.      Left Ear: Tympanic membrane, ear canal and external ear normal.      Nose: Nose normal.      Mouth/Throat:      Mouth: Mucous membranes are moist.      Pharynx: Oropharynx is clear. Uvula midline. Eyes:      General:         Right eye: No discharge. Left eye: No discharge. Conjunctiva/sclera: Conjunctivae normal.   Cardiovascular:      Rate and Rhythm: Normal rate and regular rhythm. Heart sounds: Normal heart sounds. Pulmonary:      Effort: Pulmonary effort is normal. No respiratory distress. Breath sounds: Normal breath sounds. Musculoskeletal:      Cervical back: Normal range of motion. Lymphadenopathy:      Cervical: No cervical adenopathy. Skin:     General: Skin is warm and dry. Neurological:      Mental Status: She is alert and oriented to person, place, and time. Psychiatric:         Mood and Affect: Mood normal.         Behavior: Behavior normal.         Assessment:          Diagnosis Orders   1. Bunion, right foot  Amb External Referral To Podiatry   2. Subungual hematoma of left foot, initial encounter  Amb External Referral To Podiatry   3. Lumbosacral spondylosis without myelopathy  gabapentin (NEURONTIN) 600 MG tablet   4. Postlaminectomy syndrome, lumbar  gabapentin (NEURONTIN) 600 MG tablet   5. Acute gastritis, presence of bleeding unspecified, unspecified gastritis type  pantoprazole (PROTONIX) 40 MG tablet   6.  Acute pain of right shoulder  tiZANidine (ZANAFLEX) 4 MG tablet   7. Essential hypertension  lisinopril-hydroCHLOROthiazide (PRINZIDE;ZESTORETIC) 20-12.5 MG per tablet   8. Gastroesophageal reflux disease without esophagitis  famotidine (PEPCID) 20 MG tablet   9. Functional gastrointestinal disorder  nortriptyline (PAMELOR) 10 MG capsule   10. Family history of colon cancer  nortriptyline (PAMELOR) 10 MG capsule   11.  Dyslipidemia  atorvastatin (LIPITOR) 10 MG tablet       Plan:      Orders Placed This Encounter   Procedures    Amb External Referral To Podiatry     Referral Priority:   Routine     Referral Type:   Eval and Treat     Referral Reason:   Specialty Services Required     Requested Specialty:   Podiatry     Number of Visits Requested:   1          Orders Placed This Encounter   Medications    gabapentin (NEURONTIN) 600 MG tablet     Sig: TAKE 2 TABLETS TWICE DAILY (MORNING AND EVENING)     Dispense:  360 tablet     Refill:  0    pantoprazole (PROTONIX) 40 MG tablet     Sig: TAKE 1 TABLET EVERY DAY     Dispense:  90 tablet     Refill:  1    tiZANidine (ZANAFLEX) 4 MG tablet     Sig: Take 1 tablet by mouth nightly as needed (muscle spasm)     Dispense:  90 tablet     Refill:  1    lisinopril-hydroCHLOROthiazide (PRINZIDE;ZESTORETIC) 20-12.5 MG per tablet     Sig: TAKE 1 TABLET EVERY DAY     Dispense:  90 tablet     Refill:  3    dicyclomine (BENTYL) 10 MG capsule     Sig: Take 1 capsule by mouth 4 times daily (before meals and nightly)     Dispense:  120 capsule     Refill:  3    famotidine (PEPCID) 20 MG tablet     Sig: Take 1 tablet by mouth 2 times daily as needed (GERD)     Dispense:  120 tablet     Refill:  3    nortriptyline (PAMELOR) 10 MG capsule     Sig: Take 1 capsule by mouth nightly     Dispense:  90 capsule     Refill:  1    atorvastatin (LIPITOR) 10 MG tablet     Sig: TAKE 1 TABLET EVERY DAY     Dispense:  90 tablet     Refill:  3       Return in about 4 months (around 3/17/2022). Conditions chronic and stable. Continue current treatment plan. Bunion- f/u with podiatry. Depression- Continue regular f/u with psych. Reviewed with the patient: current clinicalstatus, medications, activities and diet. Side effects, adverse effects of the medication prescribedtoday, as well as treatment plan/ rationale and result expectations have been discussedwith the patient who expresses understanding and desires to proceed. Close follow upto evaluate treatment results and for coordination of care. I have reviewedthe patient's medical history in detail and updated the computerized patient record.     Olive Reed, VERÓNICA - CNP

## 2021-11-17 NOTE — PATIENT INSTRUCTIONS
Patient Education        Bunions: Care Instructions  Your Care Instructions     A bunion is a bump on the outside of the joint at the bottom of your big toe. It can cause pain and swelling in the toe. A bunion forms when bone or tissue around the joint becomes swollen from too much pressure. You also can have a bunionette, or tailor's bunion, which forms on the joint of the little toe. Sometimes, a bunion on the big toe turns the toe in toward the second toe. This is called displacement. It can lead to problems with the other toes. You can get a bunion from having an unusual walking style, having flatfeet, or wearing tight-fitting shoes. You can treat most bunions at home with a few simple steps. If you have a lot of pain, your doctor may inject medicine into the bunion to reduce swelling for a while. If you still have pain, you may need to have surgery. Follow-up care is a key part of your treatment and safety. Be sure to make and go to all appointments, and call your doctor if you are having problems. It's also a good idea to know your test results and keep a list of the medicines you take. How can you care for yourself at home? · Ask your doctor if you can take an over-the-counter pain medicine, such as acetaminophen (Tylenol), ibuprofen (Advil, Motrin), or naproxen (Aleve). Be safe with medicines. Read and follow all instructions on the label. · Wear shoes that have a wide and deep space for the toes. Also, wear shoes that have low or flat heels and good arch supports. Do not wear tight, narrow, or high-heeled shoes. · Try bunion pads, arch supports, toe spacers, or shoe inserts. They can help shift your weight when you walk to take pressure off your big toe. · Put moleskin or another type of cushion on or around the bunion to keep it from rubbing against your shoe. · Put ice or a cold pack on the area for 10 to 20 minutes at a time as needed. Put a thin cloth between the ice and your skin.   · Prop up your foot on a pillow when you ice your toe or anytime you sit or lie down. Try to keep it above the level of your heart. This will help reduce swelling. When should you call for help? Call your doctor now or seek immediate medical care if:    · You have severe pain.     · Your toe is cool or pale or changes color.     · You have tingling, weakness, or numbness in the toe. Watch closely for changes in your health, and be sure to contact your doctor if:    · Pain and swelling get worse.     · You do not get better as expected. Where can you learn more? Go to https://True&Copepiceweb.Elev[employer]. org and sign in to your "[employer] account. Enter H210 in the NetClarity box to learn more about \"Bunions: Care Instructions. \"     If you do not have an account, please click on the \"Sign Up Now\" link. Current as of: July 1, 2021               Content Version: 13.0  © 2006-2021 Healthwise, Incorporated. Care instructions adapted under license by [facility] ([facility]). If you have questions about a medical condition or this instruction, always ask your healthcare professional. [place] [address] any warranty or liability for your use of this information.

## 2021-12-13 DIAGNOSIS — K21.9 GASTROESOPHAGEAL REFLUX DISEASE WITHOUT ESOPHAGITIS: ICD-10-CM

## 2021-12-13 RX ORDER — FAMOTIDINE 20 MG/1
20 TABLET, FILM COATED ORAL 2 TIMES DAILY PRN
Qty: 60 TABLET | Refills: 0 | Status: SHIPPED | OUTPATIENT
Start: 2021-12-13 | End: 2022-01-11

## 2021-12-13 NOTE — TELEPHONE ENCOUNTER
Future Appointments    Encounter Information    Provider Department Appt Notes   3/17/2022 Rox Miller, [address] [facility] 4 month f/u   3/17/2022 Rox Miller, [facility] AWV       Recent Visits    11/17/2021 Bunion, right foot   [facility] and [facility], APRN - CNP   05/11/2021 Chronic obstructive pulmonary disease, unspecified COPD type [facility])   [facility] and [facility], APRN - CNP

## 2022-01-09 DIAGNOSIS — K21.9 GASTROESOPHAGEAL REFLUX DISEASE WITHOUT ESOPHAGITIS: ICD-10-CM

## 2022-01-11 RX ORDER — FAMOTIDINE 20 MG/1
20 TABLET, FILM COATED ORAL 2 TIMES DAILY PRN
Qty: 60 TABLET | Refills: 0 | Status: SHIPPED | OUTPATIENT
Start: 2022-01-11 | End: 2022-02-16

## 2022-01-11 NOTE — TELEPHONE ENCOUNTER
Requesting medication refill.  Please approve or deny this request.    Rx requested:  Requested Prescriptions     Pending Prescriptions Disp Refills    famotidine (PEPCID) 20 MG tablet [Pharmacy Med Name: FAMOTIDINE 20 MG TABLET] 60 tablet 0     Sig: TAKE 1 TABLET BY MOUTH 2 TIMES DAILY AS NEEDED (GERD)       Last Office Visit:   11/17/21    Last Filled:      Last Labs:      Next Visit Date:  Future Appointments   Date Time Provider Sammi An   1/25/2022 10:15 AM Franck Campbell MD [address]   3/17/2022 11:00 AM Iraida Castillo [address] [address]   3/17/2022 11:30 AM Iraida Castillo[address]

## 2022-02-15 ENCOUNTER — PATIENT MESSAGE (OUTPATIENT)
Dept: FAMILY MEDICINE CLINIC | Age: 57
End: 2022-02-15

## 2022-02-15 DIAGNOSIS — M96.1 POSTLAMINECTOMY SYNDROME, LUMBAR: ICD-10-CM

## 2022-02-15 DIAGNOSIS — K21.9 GASTROESOPHAGEAL REFLUX DISEASE WITHOUT ESOPHAGITIS: ICD-10-CM

## 2022-02-15 DIAGNOSIS — M47.817 LUMBOSACRAL SPONDYLOSIS WITHOUT MYELOPATHY: ICD-10-CM

## 2022-02-15 LAB — VITAMIN D 25-HYDROXY: 58.1 NG/ML (ref 30–100)

## 2022-02-15 RX ORDER — PREDNISONE 10 MG/1
TABLET ORAL
Qty: 12 TABLET | Refills: 1 | Status: SHIPPED | OUTPATIENT
Start: 2022-02-15 | End: 2022-04-11 | Stop reason: ALTCHOICE

## 2022-02-16 RX ORDER — GABAPENTIN 600 MG/1
TABLET ORAL
Qty: 360 TABLET | Refills: 0 | Status: SHIPPED | OUTPATIENT
Start: 2022-02-16 | End: 2022-02-22

## 2022-02-16 RX ORDER — FAMOTIDINE 20 MG/1
20 TABLET, FILM COATED ORAL 2 TIMES DAILY PRN
Qty: 60 TABLET | Refills: 0 | Status: SHIPPED | OUTPATIENT
Start: 2022-02-16 | End: 2022-02-24 | Stop reason: SDUPTHER

## 2022-02-16 NOTE — TELEPHONE ENCOUNTER
Requesting medication refill.  Please approve or deny this request.    Rx requested:  Requested Prescriptions     Pending Prescriptions Disp Refills    gabapentin (NEURONTIN) 600 MG tablet [Pharmacy Med Name: GABAPENTIN 600 MG TABLET] 360 tablet 0     Sig: TAKE 2 TABLETS TWICE DAILY (MORNING AND EVENING)    famotidine (PEPCID) 20 MG tablet [Pharmacy Med Name: FAMOTIDINE 20 MG TABLET] 60 tablet 0     Sig: TAKE 1 TABLET BY MOUTH 2 TIMES DAILY AS NEEDED (GERD)       Last Office Visit:   11/17/2021    Last Filled:      Last Labs:      Next Visit Date:  Future Appointments   Date Time Provider Sammi An   3/4/2022  1:00 PM VERÓNICA Whitten CNP [facility]   3/4/2022  1:30 PM VERÓNICA Whitten CNP [facility]   4/25/2022  1:15 PM Joselyn Daily MD [address] [facility] [address]

## 2022-02-17 LAB
3-OH-COTININE: 59 NG/ML
COTININE: 166 NG/ML
NICOTINE: 13 NG/ML

## 2022-02-20 DIAGNOSIS — M47.817 LUMBOSACRAL SPONDYLOSIS WITHOUT MYELOPATHY: ICD-10-CM

## 2022-02-20 DIAGNOSIS — M96.1 POSTLAMINECTOMY SYNDROME, LUMBAR: ICD-10-CM

## 2022-02-21 NOTE — TELEPHONE ENCOUNTER
Comments:     Last Office Visit (last PCP visit):   11/17/2021    Next Visit Date:  Future Appointments   Date Time Provider Sammi An   3/4/2022  1:00 PM Albert Escalante, [address] [place] [address]   3/4/2022  1:30 PM Albert Escalante, [address] [place] [address]   4/25/2022  1:15 PM Trent Bender MD [facility]       **If hasn't been seen in over a year OR hasn't followed up according to last diabetes/ADHD visit, make appointment for patient before sending refill to provider.     Rx requested:  Requested Prescriptions     Pending Prescriptions Disp Refills    gabapentin (NEURONTIN) 600 MG tablet [Pharmacy Med Name: GABAPENTIN 600 MG TABLET] 360 tablet 0     Sig: TAKE 2 TABLETS TWICE DAILY (MORNING AND EVENING)

## 2022-02-22 RX ORDER — GABAPENTIN 600 MG/1
TABLET ORAL
Qty: 360 TABLET | Refills: 0 | Status: SHIPPED | OUTPATIENT
Start: 2022-02-22 | End: 2022-04-11 | Stop reason: SDUPTHER

## 2022-02-23 DIAGNOSIS — M47.817 LUMBOSACRAL SPONDYLOSIS WITHOUT MYELOPATHY: ICD-10-CM

## 2022-02-23 DIAGNOSIS — M96.1 POSTLAMINECTOMY SYNDROME, LUMBAR: ICD-10-CM

## 2022-02-23 DIAGNOSIS — Z80.0 FAMILY HISTORY OF COLON CANCER: ICD-10-CM

## 2022-02-23 DIAGNOSIS — K21.9 GASTROESOPHAGEAL REFLUX DISEASE WITHOUT ESOPHAGITIS: ICD-10-CM

## 2022-02-23 DIAGNOSIS — J44.9 CHRONIC OBSTRUCTIVE PULMONARY DISEASE, UNSPECIFIED COPD TYPE (HCC): ICD-10-CM

## 2022-02-23 DIAGNOSIS — K92.9 FUNCTIONAL GASTROINTESTINAL DISORDER: ICD-10-CM
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
## 2022-02-24 RX ORDER — NORTRIPTYLINE HYDROCHLORIDE 10 MG/1
10 CAPSULE ORAL NIGHTLY
Qty: 90 CAPSULE | Refills: 1 | Status: SHIPPED | OUTPATIENT
Start: 2022-02-24 | End: 2022-06-07

## 2022-02-24 RX ORDER — GABAPENTIN 600 MG/1
TABLET ORAL
Qty: 360 TABLET | Refills: 0 | OUTPATIENT
Start: 2022-02-24 | End: 2022-05-23

## 2022-02-24 RX ORDER — FAMOTIDINE 20 MG/1
20 TABLET, FILM COATED ORAL 2 TIMES DAILY PRN
Qty: 60 TABLET | Refills: 0 | Status: SHIPPED | OUTPATIENT
Start: 2022-02-24 | End: 2022-03-29

## 2022-02-24 RX ORDER — HYDROXYZINE 50 MG/1
TABLET, FILM COATED ORAL
Qty: 90 TABLET | Refills: 3 | Status: SHIPPED | OUTPATIENT
Start: 2022-02-24 | End: 2022-03-21

## 2022-02-24 RX ORDER — ALBUTEROL SULFATE 90 UG/1
2 AEROSOL, METERED RESPIRATORY (INHALATION) EVERY 6 HOURS PRN
Qty: 1 EACH | Refills: 3 | Status: SHIPPED | OUTPATIENT
Start: 2022-02-24

## 2022-03-15 RX ORDER — DICYCLOMINE HYDROCHLORIDE 10 MG/1
10 CAPSULE ORAL
Qty: 360 CAPSULE | Refills: 1 | Status: SHIPPED | OUTPATIENT
Start: 2022-03-15 | End: 2022-08-05

## 2022-03-15 NOTE — TELEPHONE ENCOUNTER
Requested Prescriptions     Pending Prescriptions Disp Refills    dicyclomine (BENTYL) 10 MG capsule [Pharmacy Med Name: DICYCLOMINE 10 MG CAPSULE] 360 capsule 1     Sig: TAKE 1 CAPSULE BY MOUTH 4 TIMES DAILY (BEFORE MEALS AND NIGHTLY).

## 2022-03-21 RX ORDER — HYDROXYZINE 50 MG/1
TABLET, FILM COATED ORAL
Qty: 90 TABLET | Refills: 3 | Status: SHIPPED | OUTPATIENT
Start: 2022-03-21 | End: 2022-06-20

## 2022-03-21 NOTE — TELEPHONE ENCOUNTER
Rx request   Requested Prescriptions     Pending Prescriptions Disp Refills    hydrOXYzine (ATARAX) 50 MG tablet [Pharmacy Med Name: HYDROXYZINE HCL 50 MG TABLET] 90 tablet 3     Sig: TAKE 1 TABLET THREE TIMES DAILY AS NEEDED FOR ITCHING     LOV 11/17/2021  Next Visit Date:  Future Appointments   Date Time Provider Sammi An   4/25/2022  1:15 PM Aura Lr MD [address]

## 2022-03-26 DIAGNOSIS — K21.9 GASTROESOPHAGEAL REFLUX DISEASE WITHOUT ESOPHAGITIS: ICD-10-CM

## 2022-03-29 RX ORDER — FAMOTIDINE 20 MG/1
20 TABLET, FILM COATED ORAL 2 TIMES DAILY PRN
Qty: 60 TABLET | Refills: 0 | Status: SHIPPED | OUTPATIENT
Start: 2022-03-29 | End: 2022-04-25

## 2022-04-11 ENCOUNTER — OFFICE VISIT (OUTPATIENT)
Dept: FAMILY MEDICINE CLINIC | Age: 57
End: 2022-04-11
Payer: MEDICARE

## 2022-04-11 VITALS
WEIGHT: 139.8 LBS | WEIGHT: 139.8 LBS | DIASTOLIC BLOOD PRESSURE: 88 MMHG | OXYGEN SATURATION: 97 % | SYSTOLIC BLOOD PRESSURE: 124 MMHG | HEART RATE: 82 BPM | HEART RATE: 82 BPM | BODY MASS INDEX: 25.73 KG/M2 | OXYGEN SATURATION: 97 % | BODY MASS INDEX: 25.73 KG/M2 | TEMPERATURE: 97.9 F | TEMPERATURE: 97.9 F | HEIGHT: 62 IN | HEIGHT: 62 IN | SYSTOLIC BLOOD PRESSURE: 124 MMHG | DIASTOLIC BLOOD PRESSURE: 88 MMHG

## 2022-04-11 DIAGNOSIS — K29.00 ACUTE GASTRITIS, PRESENCE OF BLEEDING UNSPECIFIED, UNSPECIFIED GASTRITIS TYPE: ICD-10-CM

## 2022-04-11 DIAGNOSIS — Z00.00 PREVENTATIVE HEALTH CARE: ICD-10-CM

## 2022-04-11 DIAGNOSIS — Z00.00 MEDICARE ANNUAL WELLNESS VISIT, SUBSEQUENT: Primary | ICD-10-CM

## 2022-04-11 DIAGNOSIS — M96.1 POSTLAMINECTOMY SYNDROME, LUMBAR: ICD-10-CM

## 2022-04-11 DIAGNOSIS — M47.817 LUMBOSACRAL SPONDYLOSIS WITHOUT MYELOPATHY: ICD-10-CM

## 2022-04-11 DIAGNOSIS — F41.9 ANXIETY: ICD-10-CM

## 2022-04-11 DIAGNOSIS — F32.A DEPRESSION, UNSPECIFIED DEPRESSION TYPE: Primary | ICD-10-CM

## 2022-04-11 LAB
ALBUMIN SERPL-MCNC: 4.2 G/DL (ref 3.5–4.6)
ALP BLD-CCNC: 83 U/L (ref 40–130)
ALT SERPL-CCNC: 11 U/L (ref 0–33)
ANION GAP SERPL CALCULATED.3IONS-SCNC: 11 MEQ/L (ref 9–15)
AST SERPL-CCNC: 17 U/L (ref 0–35)
BASOPHILS ABSOLUTE: 0 K/UL (ref 0–0.2)
BASOPHILS RELATIVE PERCENT: 0.7 %
BILIRUB SERPL-MCNC: <0.2 MG/DL (ref 0.2–0.7)
BUN BLDV-MCNC: 11 MG/DL (ref 6–20)
CALCIUM SERPL-MCNC: 9.8 MG/DL (ref 8.5–9.9)
CHLORIDE BLD-SCNC: 101 MEQ/L (ref 95–107)
CHOLESTEROL, TOTAL: 187 MG/DL (ref 0–199)
CO2: 27 MEQ/L (ref 20–31)
CREAT SERPL-MCNC: 0.68 MG/DL (ref 0.5–0.9)
EOSINOPHILS ABSOLUTE: 0.1 K/UL (ref 0–0.7)
EOSINOPHILS RELATIVE PERCENT: 1.1 %
GFR AFRICAN AMERICAN: >60
GFR NON-AFRICAN AMERICAN: >60
GLOBULIN: 2.3 G/DL (ref 2.3–3.5)
GLUCOSE BLD-MCNC: 94 MG/DL (ref 70–99)
HCT VFR BLD CALC: 42.9 % (ref 37–47)
HDLC SERPL-MCNC: 86 MG/DL (ref 40–59)
HEMOGLOBIN: 13.8 G/DL (ref 12–16)
LDL CHOLESTEROL CALCULATED: 86 MG/DL (ref 0–129)
LYMPHOCYTES ABSOLUTE: 1.9 K/UL (ref 1–4.8)
LYMPHOCYTES RELATIVE PERCENT: 29.5 %
MCH RBC QN AUTO: 30 PG (ref 27–31.3)
MCHC RBC AUTO-ENTMCNC: 32.2 % (ref 33–37)
MCV RBC AUTO: 93.1 FL (ref 82–100)
MONOCYTES ABSOLUTE: 0.4 K/UL (ref 0.2–0.8)
MONOCYTES RELATIVE PERCENT: 6.7 %
NEUTROPHILS ABSOLUTE: 4 K/UL (ref 1.4–6.5)
NEUTROPHILS RELATIVE PERCENT: 62 %
PDW BLD-RTO: 15 % (ref 11.5–14.5)
PLATELET # BLD: 276 K/UL (ref 130–400)
POTASSIUM SERPL-SCNC: 3.9 MEQ/L (ref 3.4–4.9)
RBC # BLD: 4.61 M/UL (ref 4.2–5.4)
SODIUM BLD-SCNC: 139 MEQ/L (ref 135–144)
TOTAL PROTEIN: 6.5 G/DL (ref 6.3–8)
TRIGL SERPL-MCNC: 77 MG/DL (ref 0–150)
WBC # BLD: 6.5 K/UL (ref 4.8–10.8)

## 2022-04-11 PROCEDURE — G8427 DOCREV CUR MEDS BY ELIG CLIN: HCPCS | Performed by: NURSE PRACTITIONER

## 2022-04-11 PROCEDURE — 3017F COLORECTAL CA SCREEN DOC REV: CPT | Performed by: NURSE PRACTITIONER

## 2022-04-11 PROCEDURE — 4004F PT TOBACCO SCREEN RCVD TLK: CPT | Performed by: NURSE PRACTITIONER

## 2022-04-11 PROCEDURE — 99214 OFFICE O/P EST MOD 30 MIN: CPT | Performed by: NURSE PRACTITIONER

## 2022-04-11 PROCEDURE — G0439 PPPS, SUBSEQ VISIT: HCPCS | Performed by: NURSE PRACTITIONER

## 2022-04-11 PROCEDURE — G8419 CALC BMI OUT NRM PARAM NOF/U: HCPCS | Performed by: NURSE PRACTITIONER

## 2022-04-11 RX ORDER — PANTOPRAZOLE SODIUM 40 MG/1
TABLET, DELAYED RELEASE ORAL
Qty: 90 TABLET | Refills: 1 | Status: SHIPPED | OUTPATIENT
Start: 2022-04-11 | End: 2022-05-19

## 2022-04-11 RX ORDER — VILAZODONE HYDROCHLORIDE 20 MG/1
1 TABLET ORAL DAILY
COMMUNITY
Start: 2022-03-04 | End: 2022-05-27 | Stop reason: SDUPTHER

## 2022-04-11 RX ORDER — GABAPENTIN 600 MG/1
1200 TABLET ORAL 2 TIMES DAILY
Qty: 360 TABLET | Refills: 0 | Status: SHIPPED | OUTPATIENT
Start: 2022-04-11 | End: 2022-07-19

## 2022-04-11 RX ORDER — BUPROPION HYDROCHLORIDE 150 MG/1
TABLET ORAL
COMMUNITY
Start: 2022-03-04 | End: 2022-07-19

## 2022-04-11 ASSESSMENT — LIFESTYLE VARIABLES: HOW OFTEN DO YOU HAVE A DRINK CONTAINING ALCOHOL: NEVER

## 2022-04-11 ASSESSMENT — COLUMBIA-SUICIDE SEVERITY RATING SCALE - C-SSRS
1. WITHIN THE PAST MONTH, HAVE YOU WISHED YOU WERE DEAD OR WISHED YOU COULD GO TO SLEEP AND NOT WAKE UP?: YES
6. HAVE YOU EVER DONE ANYTHING, STARTED TO DO ANYTHING, OR PREPARED TO DO ANYTHING TO END YOUR LIFE?: YES
2. HAVE YOU ACTUALLY HAD ANY THOUGHTS OF KILLING YOURSELF?: NO
7. DID THIS OCCUR IN THE LAST THREE MONTHS: NO

## 2022-04-11 ASSESSMENT — ENCOUNTER SYMPTOMS
SHORTNESS OF BREATH: 0
ORTHOPNEA: 0
BLURRED VISION: 0

## 2022-04-11 ASSESSMENT — PATIENT HEALTH QUESTIONNAIRE - PHQ9
9. THOUGHTS THAT YOU WOULD BE BETTER OFF DEAD, OR OF HURTING YOURSELF: 1
1. LITTLE INTEREST OR PLEASURE IN DOING THINGS: 3
SUM OF ALL RESPONSES TO PHQ9 QUESTIONS 1 & 2: 6
7. TROUBLE CONCENTRATING ON THINGS, SUCH AS READING THE NEWSPAPER OR WATCHING TELEVISION: 3
8. MOVING OR SPEAKING SO SLOWLY THAT OTHER PEOPLE COULD HAVE NOTICED. OR THE OPPOSITE, BEING SO FIGETY OR RESTLESS THAT YOU HAVE BEEN MOVING AROUND A LOT MORE THAN USUAL: 0
SUM OF ALL RESPONSES TO PHQ QUESTIONS 1-9: 19
SUM OF ALL RESPONSES TO PHQ QUESTIONS 1-9: 19
5. POOR APPETITE OR OVEREATING: 3
2. FEELING DOWN, DEPRESSED OR HOPELESS: 3
SUM OF ALL RESPONSES TO PHQ QUESTIONS 1-9: 19
3. TROUBLE FALLING OR STAYING ASLEEP: 0
6. FEELING BAD ABOUT YOURSELF - OR THAT YOU ARE A FAILURE OR HAVE LET YOURSELF OR YOUR FAMILY DOWN: 3
SUM OF ALL RESPONSES TO PHQ QUESTIONS 1-9: 18
4. FEELING TIRED OR HAVING LITTLE ENERGY: 3
10. IF YOU CHECKED OFF ANY PROBLEMS, HOW DIFFICULT HAVE THESE PROBLEMS MADE IT FOR YOU TO DO YOUR WORK, TAKE CARE OF THINGS AT HOME, OR GET ALONG WITH OTHER PEOPLE: 1

## 2022-04-11 NOTE — PATIENT INSTRUCTIONS
Patient Education        Hot Flashes During Menopause: Care Instructions  Overview     A hot flash is a sudden feeling of intense body heat. Your head, neck, and chest may get red. Your heartbeat may speed up, and you may feel anxious. You may find that hot flashes occur more often in warm rooms or during stressful times. Hot flashes and other symptoms are a normal response to the hormonechanges that occur before your menstrual cycle goes away completely (menopause). Hot flashes often get better and go away with time. Making lifestyle changes ortaking medicine may help with symptoms. Follow-up care is a key part of your treatment and safety. Be sure to make and go to all appointments, and call your doctor if you are having problems. It's also a good idea to know your test results and keep alist of the medicines you take. How can you care for yourself at home?  If you decide to take medicine to treat hot flashes, take it exactly as prescribed. Call your doctor if you think you are having a problem with your medicine. You will get more details on the specific medicine your doctor prescribes.  Learn to meditate. Sit quietly and focus on your breathing. Try to practice each day. Books, classes, and tapes can help you start a program.   Wear natural fabrics, such as cotton and silk. Dress in layers so you can take off clothes as needed.  Keep the room temperature cool, or use a fan. You are more likely to have a hot flash when you are too warm than when you are cool.  Use fewer blankets when you sleep at night.  Drink cold fluids rather than hot ones.  Limit food and drinks that make your symptoms worse. This may include things like caffeine, alcohol, or spicy foods.  Do not smoke. Smoking can make hot flashes worse. If you need help quitting, talk to your doctor about stop-smoking programs and medicines. These can increase your chances of quitting for good.    Get at least 30 minutes of exercise on most days of the week. Walking is a good choice. You also may want to do other activities, such as running, swimming, cycling, or playing tennis or team sports. Where can you learn more? Go to https://Bubbllilyeb.healthqcue. org and sign in to your [employer] account. Enter F700 in the YouFolio box to learn more about \"Hot Flashes During Menopause: Care Instructions. \"     If you do not have an account, please click on the \"Sign Up Now\" link. Current as of: November 22, 2021               Content Version: 13.2  © 6466-9234 Healthwise, Incorporated. Care instructions adapted under license by [facility] ([facility]). If you have questions about a medical condition or this instruction, always ask your healthcare professional. [address] any warranty or liability for your use of this information.
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
## 2022-04-11 NOTE — PATIENT INSTRUCTIONS
Personalized Preventive Plan for Endy Costa - 4/11/2022  Medicare offers a range of preventive health benefits. Some of the tests and screenings are paid in full while other may be subject to a deductible, co-insurance, and/or copay. Some of these benefits include a comprehensive review of your medical history including lifestyle, illnesses that may run in your family, and various assessments and screenings as appropriate. After reviewing your medical record and screening and assessments performed today your provider may have ordered immunizations, labs, imaging, and/or referrals for you. A list of these orders (if applicable) as well as your Preventive Care list are included within your After Visit Summary for your review. Other Preventive Recommendations:    · A preventive eye exam performed by an eye specialist is recommended every 1-2 years to screen for glaucoma; cataracts, macular degeneration, and other eye disorders. · A preventive dental visit is recommended every 6 months. · Try to get at least 150 minutes of exercise per week or 10,000 steps per day on a pedometer . · Order or download the FREE \"Exercise & Physical Activity: Your Everyday Guide\" from [employer]. Call 4-599.920.9295 or search [employer] online. · You need 5737-9942 mg of calcium and 8925-8362 IU of vitamin D per day. It is possible to meet your calcium requirement with diet alone, but a vitamin D supplement is usually necessary to meet this goal.  · When exposed to the sun, use a sunscreen that protects against both UVA and UVB radiation with an SPF of 30 or greater. Reapply every 2 to 3 hours or after sweating, drying off with a towel, or swimming. · Always wear a seat belt when traveling in a car. Always wear a helmet when riding a bicycle or motorcycle.
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
## 2022-04-12 DIAGNOSIS — J44.9 CHRONIC OBSTRUCTIVE PULMONARY DISEASE, UNSPECIFIED COPD TYPE (HCC): ICD-10-CM

## 2022-04-12 LAB
IRON SATURATION: 25 % (ref 20–55)
IRON: 84 UG/DL (ref 37–145)
TOTAL IRON BINDING CAPACITY: 333 UG/DL (ref 250–450)
UNSATURATED IRON BINDING CAPACITY: 249 UG/DL (ref 112–347)

## 2022-04-12 RX ORDER — BUDESONIDE AND FORMOTEROL FUMARATE DIHYDRATE 160; 4.5 UG/1; UG/1
AEROSOL RESPIRATORY (INHALATION)
Qty: 10.2 EACH | Refills: 5 | Status: SHIPPED | OUTPATIENT
Start: 2022-04-12 | End: 2022-08-09 | Stop reason: SDUPTHER

## 2022-04-12 NOTE — TELEPHONE ENCOUNTER
Rx requested:  Requested Prescriptions     Pending Prescriptions Disp Refills    SYMBICORT 160-4.5 MCG/ACT AERO [Pharmacy Med Name: Dakotah Mitul 160-4.5 MCG INHALER] 10.2 each 5     Sig: TAKE 2 PUFFS BY MOUTH TWICE A DAY         Last Office Visit:   4/11/2022      Next Visit Date:  Future Appointments   Date Time Provider Sammi An   4/25/2022  1:15 PM Paula Noss, MD [facility]   7/11/2022 10:30 AM Lluvia Rodríguez [address]

## 2022-04-23 DIAGNOSIS — K21.9 GASTROESOPHAGEAL REFLUX DISEASE WITHOUT ESOPHAGITIS: ICD-10-CM

## 2022-04-25 RX ORDER — FAMOTIDINE 20 MG/1
20 TABLET, FILM COATED ORAL 2 TIMES DAILY PRN
Qty: 60 TABLET | Refills: 2 | Status: SHIPPED | OUTPATIENT
Start: 2022-04-25 | End: 2022-07-21

## 2022-05-19 DIAGNOSIS — K29.00 ACUTE GASTRITIS, PRESENCE OF BLEEDING UNSPECIFIED, UNSPECIFIED GASTRITIS TYPE: ICD-10-CM

## 2022-05-19 RX ORDER — PANTOPRAZOLE SODIUM 40 MG/1
TABLET, DELAYED RELEASE ORAL
Qty: 90 TABLET | Refills: 1 | Status: SHIPPED | OUTPATIENT
Start: 2022-05-19

## 2022-05-27 ENCOUNTER — TELEPHONE (OUTPATIENT)
Dept: FAMILY MEDICINE CLINIC | Age: 57
End: 2022-05-27

## 2022-05-27 RX ORDER — VILAZODONE HYDROCHLORIDE 20 MG/1
20 TABLET ORAL DAILY
Qty: 90 TABLET | Refills: 1 | Status: SHIPPED | OUTPATIENT
Start: 2022-05-27 | End: 2022-07-19 | Stop reason: SDUPTHER

## 2022-05-27 NOTE — TELEPHONE ENCOUNTER
Patient is calling asking if you will fill viibryd 20mg until she can see Dr Mya Flowers in July. She is asking that you please send today because her insurance will  22, spouse lost his job.

## 2022-06-07 ENCOUNTER — OFFICE VISIT (OUTPATIENT)
Dept: GASTROENTEROLOGY | Age: 57
End: 2022-06-07
Payer: MEDICARE

## 2022-06-07 VITALS — HEART RATE: 77 BPM | OXYGEN SATURATION: 99 % | HEIGHT: 62 IN | BODY MASS INDEX: 27.79 KG/M2 | WEIGHT: 151 LBS

## 2022-06-07 DIAGNOSIS — Z80.0 FAMILY HISTORY OF COLON CANCER: ICD-10-CM

## 2022-06-07 DIAGNOSIS — R10.30 LOWER ABDOMINAL PAIN: ICD-10-CM

## 2022-06-07 DIAGNOSIS — E73.9 LACTOSE INTOLERANCE: ICD-10-CM

## 2022-06-07 DIAGNOSIS — K58.0 IRRITABLE BOWEL SYNDROME WITH DIARRHEA: ICD-10-CM

## 2022-06-07 DIAGNOSIS — K92.9 FUNCTIONAL GASTROINTESTINAL DISORDER: Primary | ICD-10-CM

## 2022-06-07 DIAGNOSIS — K21.9 GASTROESOPHAGEAL REFLUX DISEASE WITHOUT ESOPHAGITIS: ICD-10-CM

## 2022-06-07 PROCEDURE — 99214 OFFICE O/P EST MOD 30 MIN: CPT | Performed by: INTERNAL MEDICINE

## 2022-06-07 PROCEDURE — 4004F PT TOBACCO SCREEN RCVD TLK: CPT | Performed by: INTERNAL MEDICINE

## 2022-06-07 PROCEDURE — 3017F COLORECTAL CA SCREEN DOC REV: CPT | Performed by: INTERNAL MEDICINE

## 2022-06-07 PROCEDURE — G8419 CALC BMI OUT NRM PARAM NOF/U: HCPCS | Performed by: INTERNAL MEDICINE

## 2022-06-07 PROCEDURE — G8427 DOCREV CUR MEDS BY ELIG CLIN: HCPCS | Performed by: INTERNAL MEDICINE

## 2022-06-07 RX ORDER — NORTRIPTYLINE HYDROCHLORIDE 10 MG/1
20 CAPSULE ORAL NIGHTLY
Qty: 60 CAPSULE | Refills: 3 | Status: SHIPPED | OUTPATIENT
Start: 2022-06-07 | End: 2022-07-19 | Stop reason: SDUPTHER

## 2022-06-07 NOTE — PROGRESS NOTES
Gastroenterology Clinic Follow up Visit    Jose Alejandro Duran. Sulema Fong  [id number]  Chief Complaint   Patient presents with    Follow-up    Dysphagia    Gastroesophageal Reflux     HPI: 55 y.o. female presents to the GI clinic for colorectal cancer screening, ongoing abdominal symptoms and upper GI symptoms, last GI clinic visit on 5/6/2022    Interval change: Patient underwent EGD and colonoscopy at [facility] in 2019 where no polyps were found, mild erythema in the sigmoid colon. She has a strong family history of colon cancer. She has ongoing issues with dry heaving, retching and nausea without any vomiting. Has reflux symptoms, on Protonix and Pepcid, medications do not make any difference to her symptoms. She denies any hematemesis or weight loss. She has longstanding symptoms of abdominal cramping, abdominal pain with defecation, diarrhea. She is intolerant to lactose but continues to consume some ice cream and cheese. She does not do her own cooking and hence unable to prevent bringing in some of the foods. She has been on a Mediterranean diet over the last few months because of her 's illness, this has not made any improvement in her symptoms, she has in fact had increased abdominal cramping and pain while being on Mediterranean diet. Patient has chronic pain, has significant back pain, headaches, body aches, on medical marijuana. Currently on Pamelor 10 mg once daily, Bentyl 10 mg twice daily  She has depression, stress and anxiety. Was seeing a psychiatrist previously, currently psych medications being managed by her primary care provider. Ongoing issues with depression, no current suicidal ideation, previous suicide attempt in 2014    HPI from last GI clinic visit on 5/6/2021 summarized below:  48 y.o. female following up after last GI clinic on 11/30/2020  with functional gastrointestinal disorder. Patient forgot the last 2 appointments, ran out of her Pamelor.   Patient reports no episodes since March after she started eating banana and a 2 by me on a daily basis. She clearly has exacerbation of her symptoms with episodes of anxiety. She denies any significant change in her health, has intentionally lost some weight. Uses Bentyl 1 tab twice daily. Patient additionally on Lexapro Wellbutrin and Neurontin. HPI from last GI clinic visit on 2020  summarized below:  Patient reports a daily soft formed BM. Taking dicyclomine daily with good response, no complaints of abdominal pain. No weight loss or loss of appetite. She denies melena or hematochezia. Patient correlating abdominal pain and loose stools with stressful life events. On  she attended her aunt's  and experienced lower abdominal cramping with 4-5 loose stools throughout the day. No melena or hematochezia. Symptoms subside after 1 to 2 days. Patient denies nausea or vomiting. Takes PPI daily with good response to GERD throughout the day, symptoms return at night. No dysphagia. No chest pain, shortness of breath, or palpitations. No fever or chills.     Previous GI work up/Endoscopic investigations:   Colonoscopy 2019-erythematous mucosa in the sigmoid colon. Biopsied.  The rectum, descending colon, splenic flexure, transverse colon hepatic flexure, and cecum are normal.  Biopsied.  The examined portion of the ileowas normal.  Pathology: Right colon random cold biopsy: Colonic mucosa with no significant pathological findings. Descending/sigmoid cold biopsy: Colonic mucosa with no significant pathology. Descending/sigmoid colon biopsy: Colonic mucosa with focal congestion in lamina propria and reactive changes in the epithelium. EGD 2019- EGD 2019- salmon-colored mucosa/inlet patches.  Normal stomach. normal examined duodenum    Review of Systems   All other systems reviewed and are negative.      Past medical history, past surgical history, medication list, social and familyhistory reviewed    Pulse 77, height 5' 2\" (1.575 m), weight 151 lb (68.5 kg), SpO2 99 %, not currently breastfeeding. Physical Exam  Constitutional:       General: She is not in acute distress. Appearance: Normal appearance. She is well-developed. Eyes:      General: No scleral icterus. Cardiovascular:      Rate and Rhythm: Normal rate and regular rhythm. Pulmonary:      Effort: Pulmonary effort is normal.      Breath sounds: Normal breath sounds. Abdominal:      General: Bowel sounds are normal. There is no distension. Palpations: Abdomen is soft. There is no mass. Tenderness: There is no abdominal tenderness. There is no guarding or rebound. Hernia: A hernia is present. Hernia is present in the umbilical area. Musculoskeletal:         General: Normal range of motion. Lymphadenopathy:      Cervical: No cervical adenopathy. Neurological:      Mental Status: She is alert and oriented to person, place, and time. Psychiatric:         Behavior: Behavior normal.         Thought Content: Thought content normal.         Judgment: Judgment normal.       Laboratory, Pathology, Radiology reviewed in detail with relevantimportant investigations summarized below:    No results for input(s): WBC, HGB, HCT, MCV, PLT in the last 720 hours. Lab Results   Component Value Date    ALT 11 04/11/2022    AST 17 04/11/2022    ALKPHOS 83 04/11/2022    BILITOT <0.2 04/11/2022      Assessment and Plan:  Daphne Joseph Julissa 55 y.o. 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     1. Functional gastrointestinal disorder  - Lifestyle modification with stress reduction, relaxation techniques discussed in detail.  - Dietary changes discussed, including FODMAP  - Fiber supplementation, issue with fiber increasing gas and bloating discussed  - Continue with Bentyl at current dose  -Increase Pamelor dose to 20 mg once daily  Rx provided for  - nortriptyline (PAMELOR) 10 MG capsule; Take 2 capsules by mouth nightly     2. Lactose intolerance  -Detailed discussion regarding avoiding all dairy products    3. Irritable bowel syndrome with diarrhea    4. Lower abdominal pain  -Bentyl at 10 mg twice daily dose, may increase if worsening symptoms    5. Family history of colon cancer  -Colonoscopy next due August 2024    6. Gastroesophageal reflux disease without esophagitis  -Continue with Protonix and Pepcid  - Anti-reflux lifestyle modification discussed at length  -No indication for EGD at this time, no alarm symptoms     Return in about 8 weeks (around 8/2/2022). Ramona Zeng MD   Staff Gastroenterologist  [facility]    Please note this report has been partially produced using speech recognition software and may cause/contain errors related to that system including grammar, punctuation and spelling as well as words andphrases that may seem inappropriate. If there are questions or concerns please feel free to contact me to clarify.

## 2022-06-20 RX ORDER — HYDROXYZINE 50 MG/1
TABLET, FILM COATED ORAL
Qty: 270 TABLET | Refills: 1 | Status: SHIPPED | OUTPATIENT
Start: 2022-06-20 | End: 2022-07-25

## 2022-06-20 NOTE — TELEPHONE ENCOUNTER
Rx requested:  Requested Prescriptions     Pending Prescriptions Disp Refills    hydrOXYzine HCl (ATARAX) 50 MG tablet [Pharmacy Med Name: HYDROXYZINE HCL 50 MG TABLET] 270 tablet 1     Sig: TAKE 1 TABLET THREE TIMES DAILY AS NEEDED FOR ITCHING         Last Office Visit:   4/11/2022      Next Visit Date:  Future Appointments   Date Time Provider Sammi Nataliya   7/11/2022 10:30 AM Jaki Siu, [address] [place] [address]   7/19/2022 10:30 AM Lenora King MD MLOX  [facility]   8/2/2022 10:00 AM Suellen Aariza

## 2022-07-11 ENCOUNTER — OFFICE VISIT (OUTPATIENT)
Dept: FAMILY MEDICINE CLINIC | Age: 57
End: 2022-07-11
Payer: MEDICARE

## 2022-07-11 VITALS
OXYGEN SATURATION: 99 % | HEART RATE: 92 BPM | BODY MASS INDEX: 27.49 KG/M2 | SYSTOLIC BLOOD PRESSURE: 126 MMHG | WEIGHT: 149.4 LBS | HEIGHT: 62 IN | TEMPERATURE: 97.5 F | DIASTOLIC BLOOD PRESSURE: 88 MMHG

## 2022-07-11 DIAGNOSIS — H92.03 OTALGIA OF BOTH EARS: ICD-10-CM

## 2022-07-11 DIAGNOSIS — R60.9 DEPENDENT EDEMA: ICD-10-CM

## 2022-07-11 DIAGNOSIS — M47.817 LUMBOSACRAL SPONDYLOSIS WITHOUT MYELOPATHY: ICD-10-CM

## 2022-07-11 DIAGNOSIS — M25.562 ACUTE PAIN OF LEFT KNEE: ICD-10-CM

## 2022-07-11 DIAGNOSIS — I10 PRIMARY HYPERTENSION: Primary | ICD-10-CM

## 2022-07-11 DIAGNOSIS — M96.1 POSTLAMINECTOMY SYNDROME, LUMBAR: ICD-10-CM

## 2022-07-11 DIAGNOSIS — K42.9 PERIUMBILICAL HERNIA: ICD-10-CM

## 2022-07-11 PROCEDURE — 1036F TOBACCO NON-USER: CPT | Performed by: NURSE PRACTITIONER

## 2022-07-11 PROCEDURE — G8427 DOCREV CUR MEDS BY ELIG CLIN: HCPCS | Performed by: NURSE PRACTITIONER

## 2022-07-11 PROCEDURE — 3017F COLORECTAL CA SCREEN DOC REV: CPT | Performed by: NURSE PRACTITIONER

## 2022-07-11 PROCEDURE — G8419 CALC BMI OUT NRM PARAM NOF/U: HCPCS | Performed by: NURSE PRACTITIONER

## 2022-07-11 PROCEDURE — 99214 OFFICE O/P EST MOD 30 MIN: CPT | Performed by: NURSE PRACTITIONER

## 2022-07-11 RX ORDER — PREDNISONE 10 MG/1
TABLET ORAL
Qty: 12 TABLET | Refills: 1 | Status: SHIPPED | OUTPATIENT
Start: 2022-07-11

## 2022-07-11 RX ORDER — PREGABALIN 75 MG/1
75 CAPSULE ORAL 3 TIMES DAILY
Qty: 42 CAPSULE | Refills: 0 | Status: SHIPPED | OUTPATIENT
Start: 2022-07-11 | End: 2022-07-26 | Stop reason: DRUGHIGH

## 2022-07-11 ASSESSMENT — ENCOUNTER SYMPTOMS
SHORTNESS OF BREATH: 0
ORTHOPNEA: 0
BLURRED VISION: 0

## 2022-07-11 NOTE — PATIENT INSTRUCTIONS
Patient Education        Fibromyalgia: Care Instructions  Overview     Fibromyalgia is a painful condition that is not completely understood by medical experts. The cause of fibromyalgia is not known. It can make you feel tired and ache all over. It causes tender spots at specific points of the body that hurt only when you press on them. You may have trouble sleeping, as wellas other symptoms. These problems can upset your work and home life. Symptoms tend to come and go, although they may never go away completely. Fibromyalgia does not harm your muscles, joints, or organs. Follow-up care is a key part of your treatment and safety. Be sure to make and go to all appointments, and call your doctor if you are having problems. It's also a good idea to know your test results and keep alist of the medicines you take. How can you care for yourself at home?  Exercise often. Walk, swim, or bike to help with pain and sleep problems and to make you feel better.  Try to get a good night's sleep. Go to bed and get up at the same time each day, whether you feel rested or not. Make sure you have a good mattress and pillow.  Reduce stress. Avoid things that cause you stress, if you can. If not, work at making them less stressful. Learn to use biofeedback, guided imagery, meditation, or other methods to relax.  Make healthy changes. Eat a balanced diet, quit smoking, and limit alcohol and caffeine.  Use a heating pad set on low or take warm baths or showers for pain. Using cold packs for up to 20 minutes at a time can also relieve pain. Put a thin cloth between the cold pack and your skin. A gentle massage might help too.  Be safe with medicines. Take your medicines exactly as prescribed. Call your doctor if you think you are having a problem with your medicine. Your doctor may talk to you about taking antidepressant medicines.  These medicines may improve sleep, relieve pain, and in some cases treat depression.  Learn about fibromyalgia. This makes coping easier. Then, take an active role in your treatment.  Think about joining a support group with others who have fibromyalgia to learn more and get support. When should you call for help? Watch closely for changes in your health, and be sure to contact your doctor if:     You feel sad, helpless, or hopeless; lose interest in things you used to enjoy; or have other symptoms of depression.      Your fibromyalgia symptoms get worse. Where can you learn more? Go to [email or web address] and sign in to your [employer] account. Enter V003 in the [facility] box to learn more about \"Fibromyalgia: Care Instructions. \"     If you do not have an account, please click on the \"Sign Up Now\" link. Current as of: December 13, 2021               Content Version: 13.3  © 6257-1404 Healthwise, Incorporated. Care instructions adapted under license by [facility] ([facility]). If you have questions about a medical condition or this instruction, always ask your healthcare professional. [address] any warranty or liability for your use of this information.

## 2022-07-11 NOTE — PROGRESS NOTES
Subjective:      Patient ID: Jefferson Ambriz. Laura Miner is a 55 y.o. female who presents today for:     Chief Complaint   Patient presents with    Hypertension     Patient presents today to follow up on hypertension.  Knee Pain     Patient c/o Lt knee pain. Patient states it hurts in the back on her knee.  Foot Swelling     Patient c/o bilateral feet swelling x 3-4 weeks ago. Hypertension  This is a chronic problem. The current episode started more than 1 month ago. The problem is unchanged. The problem is controlled. Pertinent negatives include no anxiety, blurred vision, chest pain, headaches, malaise/fatigue, neck pain, orthopnea, palpitations, peripheral edema, PND, shortness of breath or sweats. There are no associated agents to hypertension. Past treatments include ACE inhibitors and diuretics. The current treatment provides significant improvement. Pt reports pain behind left knee intermittently. Denies any trauma. Feels a little swollen behind knee. Had foot swelling  About 2-3 weeks ago for about a week. It has since resolved. No further swelling. No associated sob or other concerns. Reports umbilical hernia for years. Hurts if she wears jeans and its tight there. No problems with BM related to it. Fibro and back pain are stable, but she noticed that gabapentin causes drowsiness and less clear thinking. She did not realize until she was off of it for a few days when she ran out. She would like to discuss other options.      Past Medical History:   Diagnosis Date    Anxiety     [facility]     Bronchitis     Cancer ([facility]     left breast    Chronic pain syndrome 10/6/2016    Colon polyps     Depression     suicide attempts    Duodenitis     Esophageal erosions     Family hx of colon cancer, Mother & Father 4/18/2017    Fibromyalgia     Foot deformity 10/6/2016    GERD (gastroesophageal reflux disease)     gastritis    History of therapeutic radiation     Hypertension     Lumbar radiculopathy 5/10/2017    Osteoarthritis     TMJ arthritis 10/6/2016     Past Surgical History:   Procedure Laterality Date    BONE BIOPSY Right 2018    CT Guided Right Rib Fine Needle Aspiration by DR Martinez Colcord    BREAST BIOPSY      BREAST ENHANCEMENT SURGERY      BREAST LUMPECTOMY      BREAST SURGERY      breast bx/ lumpectomy    COLONOSCOPY      DILATION AND CURETTAGE OF UTERUS      ENDOSCOPY, COLON, DIAGNOSTIC      HYSTERECTOMY (CERVIX STATUS UNKNOWN)      LAPAROSCOPY      LUMBAR DISCECTOMY      L4-L5    OVARY REMOVAL      IA COLORECTAL SCRN; HI RISK IND N/A 2018    COLONOSCOPY performed by Robbie Umana MD at [address] ESOPHAGOGASTRODUODENOSCOPY TRANSORAL DIAGNOSTIC N/A 2017    EGD ESOPHAGOGASTRODUODENOS[facility] [address]      UPPER GASTROINTESTINAL ENDOSCOPY       Family History   Problem Relation Age of Onset    Cancer Mother         colon    High Blood Pressure Mother     Heart Disease Mother     Colon Cancer Mother     Cancer Father         colon    High Blood Pressure Father     Colon Cancer Father     Celiac Disease Neg Hx     Crohn's Disease Neg Hx      Social History     Socioeconomic History    Marital status:      Spouse name: Not on file    Number of children: Not on file    Years of education: Not on file    Highest education level: Not on file   Occupational History    Not on file   Tobacco Use    Smoking status: Former Smoker     Packs/day: 0.50     Years: 40.00     Pack years: 20.00     Types: Cigarettes     Start date: 0     Quit date: 2022     Years since quittin.2    Smokeless tobacco: Never Used   Vaping Use    Vaping Use: Never used   Substance and Sexual Activity    Alcohol use:  Yes     Alcohol/week: 0.0 standard drinks     Comment: once in a while    Drug use: No    Sexual activity: Yes   Other Topics Concern    Not on file   Social History Narrative    Not on file     Social Determinants of Health     Financial Resource Strain: Low Risk     Difficulty of Paying Living Expenses: Not hard at all   Food Insecurity: No Food Insecurity    Worried About Running Out of Food in the Last Year: Never true    [address] in the Last Year: Never true   Transportation Needs:     Lack of Transportation (Medical): Not on file    Lack of Transportation (Non-Medical):  Not on file   Physical Activity: Inactive    Days of Exercise per Week: 0 days    Minutes of Exercise per Session: 0 min   Stress:     Feeling of Stress : Not on file   Social Connections:     Frequency of Communication with Friends and Family: Not on file    Frequency of Social Gatherings with Friends and Family: Not on file    Attends Restoration Services: Not on file    Active Member of Clubs or Organizations: Not on file    Attends Club or Organization Meetings: Not on file    Marital Status: Not on file   Intimate Partner Violence:     Fear of Current or Ex-Partner: Not on file    Emotionally Abused: Not on file    Physically Abused: Not on file    Sexually Abused: Not on file   Housing Stability:     Unable to Pay for Housing in the Last Year: Not on file    Number of [place] in the Last Year: Not on file    Unstable Housing in the Last Year: Not on file     Current Outpatient Medications on File Prior to Visit   Medication Sig Dispense Refill    hydrOXYzine HCl (ATARAX) 50 MG tablet TAKE 1 TABLET THREE TIMES DAILY AS NEEDED FOR ITCHING 270 tablet 1    nortriptyline (PAMELOR) 10 MG capsule Take 2 capsules by mouth nightly 60 capsule 3    VILAZODONE HCL 20 MG Tablet Take 1 tablet by mouth daily 90 tablet 1    pantoprazole (PROTONIX) 40 MG tablet TAKE 1 TABLET EVERY DAY 90 tablet 1    famotidine (PEPCID) 20 MG tablet TAKE 1 TABLET BY MOUTH 2 TIMES DAILY AS NEEDED (GERD) 60 tablet 2    SYMBICORT 160-4.5 MCG/ACT AERO TAKE 2 PUFFS BY MOUTH TWICE A DAY 10.2 each 5    buPROPion (WELLBUTRIN XL) 150 MG extended release tablet TAKE 1 TABLET BY MOUTH EVERY DAY IN THE MORNING      gabapentin (NEURONTIN) 600 MG tablet Take 2 tablets by mouth 2 times daily for 90 days. 360 tablet 0    dicyclomine (BENTYL) 10 MG capsule TAKE 1 CAPSULE BY MOUTH 4 TIMES DAILY (BEFORE MEALS AND NIGHTLY). (Patient taking differently: Take 10 mg by mouth in the morning and at bedtime ) 360 capsule 1    albuterol sulfate HFA (PROAIR HFA) 108 (90 Base) MCG/ACT inhaler Inhale 2 puffs into the lungs every 6 hours as needed for Wheezing 1 each 3    tiZANidine (ZANAFLEX) 4 MG tablet Take 1 tablet by mouth nightly as needed (muscle spasm) 90 tablet 1    lisinopril-hydroCHLOROthiazide (PRINZIDE;ZESTORETIC) 20-12.5 MG per tablet TAKE 1 TABLET EVERY DAY 90 tablet 3    atorvastatin (LIPITOR) 10 MG tablet TAKE 1 TABLET EVERY DAY 90 tablet 3     No current facility-administered medications on file prior to visit. Allergies:  Ultram [tramadol], Norco [hydrocodone-acetaminophen], and Percocet [oxycodone-acetaminophen]    Review of Systems   Constitutional: Negative for malaise/fatigue. Eyes: Negative for blurred vision. Respiratory: Negative for shortness of breath. Cardiovascular: Negative for chest pain, palpitations, orthopnea and PND. Musculoskeletal: Negative for neck pain. Neurological: Negative for headaches. Objective:   /88 (Site: Left Upper Arm, Position: Sitting, Cuff Size: Medium Adult)   Pulse 92   Temp 97.5 °F (36.4 °C) (Temporal)   Ht 5' 2\" (1.575 m)   Wt 149 lb 6.4 oz (67.8 kg)   SpO2 99%   BMI 27.33 kg/m²     Physical Exam  Constitutional:       Appearance: She is well-developed. HENT:      Head: Normocephalic.       Right Ear: Tympanic membrane, ear canal and external ear normal.      Left Ear: Tympanic membrane, ear canal and external ear normal.      Nose: Nose normal. Mouth/Throat:      Mouth: Mucous membranes are moist.      Pharynx: Oropharynx is clear. Uvula midline. Eyes:      General:         Right eye: No discharge. Left eye: No discharge. Conjunctiva/sclera: Conjunctivae normal.   Cardiovascular:      Rate and Rhythm: Normal rate and regular rhythm. Heart sounds: Normal heart sounds. Pulmonary:      Effort: Pulmonary effort is normal. No respiratory distress. Breath sounds: Normal breath sounds. Musculoskeletal:      Cervical back: Normal range of motion. Lymphadenopathy:      Cervical: No cervical adenopathy. Skin:     General: Skin is warm and dry. Neurological:      Mental Status: She is alert and oriented to person, place, and time. Psychiatric:         Mood and Affect: Mood normal.         Behavior: Behavior normal.         Assessment:          Diagnosis Orders   1. Primary hypertension     2. Dependent edema     3. Acute pain of left knee     4. Lumbosacral spondylosis without myelopathy  pregabalin (LYRICA) 75 MG capsule    Handicap Placard MISC   5. Postlaminectomy syndrome, lumbar  pregabalin (LYRICA) 75 MG capsule    Handicap Placard MISC   6. Otalgia of both ears  predniSONE (DELTASONE) 10 MG tablet   7. Periumbilical hernia         Plan:      No orders of the defined types were placed in this encounter. Orders Placed This Encounter   Medications    predniSONE (DELTASONE) 10 MG tablet     Sig: Take 3 tab po day before and day of flying     Dispense:  12 tablet     Refill:  1    pregabalin (LYRICA) 75 MG capsule     Sig: Take 1 capsule by mouth 3 times daily for 14 days. Dispense:  42 capsule     Refill:  0    Handicap Placard MISC     Sig: by Does not apply route DX lumbosacral spondylosis with myopathy Exp 7/11/27     Dispense:  1 each     Refill:  0         Return in about 2 weeks (around 7/25/2022). 1. Primary hypertension  Doing well. Continue lisinopril hctz daily. DASH.     2. Dependent edema  Currently resolved. Watch sodium intake. 3. Acute pain of left knee  Likely bakers cyst. Consider ultrasound. 4. Lumbosacral spondylosis without myelopathy  PDMP reviewed. Stop gabapentin and change ot lyrica. F/u 2 weeks to discuss. - pregabalin (LYRICA) 75 MG capsule; Take 1 capsule by mouth 3 times daily for 14 days. Dispense: 42 capsule; Refill: 0  - Handicap Placard MISC; by Does not apply route DX lumbosacral spondylosis with myopathy Exp 7/11/27  Dispense: 1 each; Refill: 0    5. Postlaminectomy syndrome, lumbar    - pregabalin (LYRICA) 75 MG capsule; Take 1 capsule by mouth 3 times daily for 14 days. Dispense: 42 capsule; Refill: 0  - Handicap Placard MISC; by Does not apply route DX lumbosacral spondylosis with myopathy Exp 7/11/27  Dispense: 1 each; Refill: 0    6. Otalgia of both ears    - predniSONE (DELTASONE) 10 MG tablet; Take 3 tab po day before and day of flying  Dispense: 12 tablet; Refill: 1    7. Periumbilical hernia        Reviewed with the patient: current clinicalstatus, medications, activities and diet. Side effects, adverse effects of the medication prescribedtoday, as well as treatment plan/ rationale and result expectations have been discussedwith the patient who expresses understanding and desires to proceed. Close follow upto evaluate treatment results and for coordination of care. I have reviewedthe patient's medical history in detail and updated the computerized patient record.     Kurtis Neely, APRN - CNP

## 2022-07-18 ASSESSMENT — ANXIETY QUESTIONNAIRES
4. TROUBLE RELAXING: 3
IF YOU CHECKED OFF ANY PROBLEMS ON THIS QUESTIONNAIRE, HOW DIFFICULT HAVE THESE PROBLEMS MADE IT FOR YOU TO DO YOUR WORK, TAKE CARE OF THINGS AT HOME, OR GET ALONG WITH OTHER PEOPLE: SOMEWHAT DIFFICULT
2. NOT BEING ABLE TO STOP OR CONTROL WORRYING: NEARLY EVERY DAY
7. FEELING AFRAID AS IF SOMETHING AWFUL MIGHT HAPPEN: 1
1. FEELING NERVOUS, ANXIOUS, OR ON EDGE: NEARLY EVERY DAY
6. BECOMING EASILY ANNOYED OR IRRITABLE: SEVERAL DAYS
1. FEELING NERVOUS, ANXIOUS, OR ON EDGE: 3
3. WORRYING TOO MUCH ABOUT DIFFERENT THINGS: NEARLY EVERY DAY
6. BECOMING EASILY ANNOYED OR IRRITABLE: 1
3. WORRYING TOO MUCH ABOUT DIFFERENT THINGS: 3
2. NOT BEING ABLE TO STOP OR CONTROL WORRYING: 3
IF YOU CHECKED OFF ANY PROBLEMS ON THIS QUESTIONNAIRE, HOW DIFFICULT HAVE THESE PROBLEMS MADE IT FOR YOU TO DO YOUR WORK, TAKE CARE OF THINGS AT HOME, OR GET ALONG WITH OTHER PEOPLE: SOMEWHAT DIFFICULT
5. BEING SO RESTLESS THAT IT IS HARD TO SIT STILL: SEVERAL DAYS
5. BEING SO RESTLESS THAT IT IS HARD TO SIT STILL: 1
7. FEELING AFRAID AS IF SOMETHING AWFUL MIGHT HAPPEN: SEVERAL DAYS
GAD7 TOTAL SCORE: 15
4. TROUBLE RELAXING: NEARLY EVERY DAY

## 2022-07-18 ASSESSMENT — LIFESTYLE VARIABLES
ALCOHOL_DAYS_PER_WEEK: 0
PAST THREE MONTHS WHAT IS THE LARGEST AMOUNT OF ALCOHOLIC DRINKS YOU HAVE CONSUMED IN ONE DAY: 2
HISTORY_ALCOHOL_USE: NO
HAVE YOU EVER RECEIVED ALCOHOL OR OTHER DRUG ABUSE TREATMENT: NO

## 2022-07-19 ENCOUNTER — OFFICE VISIT (OUTPATIENT)
Dept: BEHAVIORAL/MENTAL HEALTH CLINIC | Age: 57
End: 2022-07-19
Payer: MEDICARE

## 2022-07-19 VITALS
HEART RATE: 77 BPM | WEIGHT: 151 LBS | DIASTOLIC BLOOD PRESSURE: 79 MMHG | BODY MASS INDEX: 27.62 KG/M2 | SYSTOLIC BLOOD PRESSURE: 129 MMHG

## 2022-07-19 DIAGNOSIS — K58.0 IRRITABLE BOWEL SYNDROME WITH DIARRHEA: ICD-10-CM

## 2022-07-19 DIAGNOSIS — F43.10 PTSD (POST-TRAUMATIC STRESS DISORDER): Primary | ICD-10-CM

## 2022-07-19 PROCEDURE — 90792 PSYCH DIAG EVAL W/MED SRVCS: CPT | Performed by: PSYCHIATRY & NEUROLOGY

## 2022-07-19 PROCEDURE — 1036F TOBACCO NON-USER: CPT | Performed by: PSYCHIATRY & NEUROLOGY

## 2022-07-19 RX ORDER — NORTRIPTYLINE HYDROCHLORIDE 10 MG/1
20 CAPSULE ORAL NIGHTLY
Qty: 180 CAPSULE | Refills: 1 | Status: SHIPPED | OUTPATIENT
Start: 2022-07-19

## 2022-07-19 RX ORDER — VILAZODONE HYDROCHLORIDE 20 MG/1
20 TABLET ORAL DAILY
Qty: 90 TABLET | Refills: 1 | Status: SHIPPED | OUTPATIENT
Start: 2022-07-19

## 2022-07-19 RX ORDER — BUPROPION HYDROCHLORIDE 150 MG/1
150 TABLET ORAL EVERY MORNING
Qty: 90 TABLET | Refills: 1 | Status: SHIPPED | OUTPATIENT
Start: 2022-07-19

## 2022-07-19 NOTE — PROGRESS NOTES
7/19/2022    Chief complaint:   Chief Complaint   Patient presents with    New Patient     Establish Care         INITIAL PSYCHIATRIC ASSESSMENT  IN PERSON EVALUATION --     Patient and Provider Location: Met with patient for 60 minutes of face-to-face time in-person           Provider and patient location (City/State): [address] [place] [address] Suzi, [address] [place] [address]    History of Present Illness    Claudia Montano is a 55 y.o. female with a history significant for anxiety and depression that has worsened over the past several months.      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   Legal History none   Belief in God or a higher power (Zoroastrian): \"I don't know\"     Past Psychiatric History (over the past 6 months, unless otherwise specified):  Previous diagnoses/symptoms: Post-Traumatic Stress Disorder, Depression, Generalized Anxiety Disorder   Previous therapy: ramez Devine at [facility]   Self-injurious behavior/risky thoughts or behaviors (past suicidal ideation/attempt): suicide attempt 2014   Violence/Risk to others (past homicidal ideation/attempt): none   Current psychiatric provider: Mitesh Vargas   Previous psychiatric medication trials: (below)  Previous psychiatric hospitalizations: none   Pt has never had [address] [place] or ECT    Past Medical History:  History of head trauma/seizures: No    Active Ambulatory Problems     Diagnosis Date Noted    Anxiety, denies symptoms of Obsessive Compulsive Disorder. Panic attacks No  Sleep: sleeping 5-7 hours every 24 hour period on average; reports sleep is ok, not waking up during the night   VA symptoms in the past or currently: DENIES any RECENT symptoms of episodic mood disturbances which would meet criteria for va or hypomania, although he does endorse HISTORY of episodes including the following symptoms:  Nightmares  No  History of trauma Yes PTSD: Patient endorses exposure to traumatic event , endorses subsequent re-experiencing of traumatic event, avoidance of stimuli , increased arousal, hypervigilance, difficulty with trusting others, negative expectations about others, and dissociative symptoms: feeling  from emotions as though they were watching their life as a movie, or disconnected  Dissociative Symptoms No  ADHD Denies prev diagnosis of ADHD   Auditory or Visual Hallucinations: No  Current suicidal/homicidal ideations: No    Medications    Current Outpatient Medications:     nortriptyline (PAMELOR) 10 MG capsule, Take 2 capsules by mouth nightly, Disp: 180 capsule, Rfl: 1    VILAZODONE HCL 20 MG Tablet, Take 1 tablet by mouth in the morning., Disp: 90 tablet, Rfl: 1    buPROPion (WELLBUTRIN XL) 150 MG extended release tablet, Take 1 tablet by mouth every morning, Disp: 90 tablet, Rfl: 1    predniSONE (DELTASONE) 10 MG tablet, Take 3 tab po day before and day of flying, Disp: 12 tablet, Rfl: 1    pregabalin (LYRICA) 75 MG capsule, Take 1 capsule by mouth 3 times daily for 14 days. , Disp: 42 capsule, Rfl: 0    Handicap Placard MISC, by Does not apply route DX lumbosacral spondylosis with myopathy Exp 7/11/27, Disp: 1 each, Rfl: 0    hydrOXYzine HCl (ATARAX) 50 MG tablet, TAKE 1 TABLET THREE TIMES DAILY AS NEEDED FOR ITCHING, Disp: 270 tablet, Rfl: 1    pantoprazole (PROTONIX) 40 MG tablet, TAKE 1 TABLET EVERY DAY, Disp: 90 tablet, Rfl: 1    famotidine (PEPCID) 20 MG tablet, TAKE 1 TABLET BY MOUTH 2 TIMES DAILY AS NEEDED (GERD), Disp: 60 tablet, Rfl: 2    SYMBICORT 160-4.5 MCG/ACT AERO, TAKE 2 PUFFS BY MOUTH TWICE A DAY, Disp: 10.2 each, Rfl: 5    dicyclomine (BENTYL) 10 MG capsule, TAKE 1 CAPSULE BY MOUTH 4 TIMES DAILY (BEFORE MEALS AND NIGHTLY). (Patient taking differently: Take 10 mg by mouth in the morning and at bedtime ), Disp: 360 capsule, Rfl: 1    albuterol sulfate HFA (PROAIR HFA) 108 (90 Base) MCG/ACT inhaler, Inhale 2 puffs into the lungs every 6 hours as needed for Wheezing, Disp: 1 each, Rfl: 3    tiZANidine (ZANAFLEX) 4 MG tablet, Take 1 tablet by mouth nightly as needed (muscle spasm), Disp: 90 tablet, Rfl: 1    lisinopril-hydroCHLOROthiazide (PRINZIDE;ZESTORETIC) 20-12.5 MG per tablet, TAKE 1 TABLET EVERY DAY, Disp: 90 tablet, Rfl: 3    atorvastatin (LIPITOR) 10 MG tablet, TAKE 1 TABLET EVERY DAY, Disp: 90 tablet, Rfl: 3    Allergies  Allergies   Allergen Reactions    Ultram [Tramadol] Shortness Of Breath     Plus nausea    Norco [Hydrocodone-Acetaminophen] Nausea And Vomiting    Percocet [Oxycodone-Acetaminophen] Nausea And Vomiting       Evaluation    Vitals:   Reviewed vitals from recent visit, no concerns                                                                                            BP Readings from Last 3 Encounters:   07/19/22 129/79   07/11/22 126/88   04/11/22 124/88       Wt Readings from Last 3 Encounters:   07/19/22 151 lb (68.5 kg)   07/11/22 149 lb 6.4 oz (67.8 kg)   06/07/22 151 lb (68.5 kg)         Labs:     No other recent labs or imaging    Lab Results   Component Value Date    ALT 11 04/11/2022    AST 17 04/11/2022    ALKPHOS 83 04/11/2022    BILITOT <0.2 04/11/2022       Imaging/Radiology  No results found. Medical Review Of Systems:  Constitutional:  Negative for fever and activity change. HENT:  Negative for nosebleeds, congestion, rhinorrhea, mouth sores, neck pain and neck stiffness. Eyes:   No complaints of blurred vision.   Respiratory:  Negative for cough and wheezing. Cardiovascular:  Negative for chest pain. Gastrointestinal:  Negative for nausea, abdominal pain, diarrhea and constipation  Genitourinary:  Negative of decreased urine volume and difficulty urinating. Reproductive: No complaints reported at this time. Musculoskeletal:  Negative for back pain. Skin:  Negative for pallor, rash and wound. Neurological:  Negative for dizziness, weakness and headaches. Mental Status Evaluation:  Level of consciousness:  alert and oriented to person, place, and situation  Appearance:  well-appearing good grooming and good hygiene  Behavior/Motor:  no abnormalities noted  Attitude toward examiner:  attentive and good eye contact  Speech:  spontaneous, normal rate and normal volume   Mood: \"anxious\", appears euthymic  Affect:  mood congruent  Thought processes:  linear and logical  Thought content:  Denies suicidal or homicidal ideation, denies auditory or visual hallucinations  Cognition:  no deficits in attention, concentration notable, recent memory grossly intact  Concentration intact  Memory intact  Insight good   Judgement fair   Fund of Knowledge adequate    Assessment:   Pt is a 55year-old female with history consistent with diagnosis of Post-Traumatic Stress Disorder. Would benefit from ongoing therapy to address Post-Traumatic Stress Disorder symptoms     Would also benefit from medication targeting anxiety and depression symptoms.       RISK FACTOR ASSESSMENT: Patient endorses the following risk factors which may increase their future risk of suicide attempt: , age <25 or greater than 55>, and current depressed mood  RISK FACTOR ASSESSMENT: Patient endorses the following supportive factors which may help to keep them safe: , female gender, future-oriented, and denies access to firearms     Medical Diagnoses:  Patient Active Problem List   Diagnosis    Anxiety, Psychiatry only    Arthritis    Breast cancer in situ, [place]    Depression, Psychiatry only    Fibromyalgia    Gastritis    Hypertension    Colon polyps    Esophageal erosions    Duodenitis    TMJ arthritis    Foot deformity    Family history of colon cancer    Lumbar radiculopathy    Postlaminectomy syndrome, lumbar    Lumbosacral spondylosis without myelopathy    Hx of colonic polyps    Bone lesion    Chronic pain syndrome, Hector from them only    Cervical spondylosis without myelopathy    Bronchitis       Psychiatric Diagnoses/Impressions:  1. Mild episode of recurrent major depressive disorder ([address]    2. MARTÍN (generalized anxiety disorder)    3. Irritable bowel syndrome with diarrhea        TREATMENT GOALS:  Symptom reduction, Medication adherence, maintain therapeutic gains    Plan:  Reviewed past medications patient has tried:     DATE MEDICATION EFFICACY/SYMPTOM CONTROL Side effects    Current  Bupropion XL (Wellbutrin XL)      Current  Nortriptyline (Pamelor)      Current  Vilazodone (Viibryd)      Current  Hydroxyzine hcl (Atarax)      Past  Desvenlafaxine (Pristiq)  I found out I had breast cancer     Past  Duloxetine (Cymbalta)       Venlafaxine XR (Effexor XR)  For five years      Trazodone (Desyrel)       Zolpidem (Ambien)       Buspirone (Buspar)       Gabapentin (Neurontin)          7/19/22  -  patient would like to try to get rid of bupropion XL (Wellbutrin XL) in the future and see if it changes her mood, feels this may be unnecessary    -  nortriptyline (Pamelor) 20 mg continue for sleep, mood   - at this time, patient would like to try consistent dosing f all of her medications. Will plan to adjust these at next appointment and plan to keep a mood journal to monitor for improvement   -  Routine labs ordered to rule out organic etiology to psychiatric symptoms     -  Medications and routine discussed with patient.  I have reviewed the patient's controlled substance prescription history, as maintained in the [place] controlled substance prescription monitoring program), and have identified no concerns for abuse of medications. Educated patient on medication potential side effects. Risk & benefits discussed: including but not limited to possible off-label medication usage. Risks, benefits, side effects of medications, including any / all black box warnings, discussed with patient, who verbalizes their understanding. Crisis plan reviewed and patient verbally contracts for safety. ?  **Patient has been notified: They are to call 911 or go to their nearest E.R. if they are experiencing a medical emergency or suicidal ideations/homicidal ideations. **  All ancillary documentation entered reviewed by provider. Go to ED with emergent symptoms or safety concerns. Pt is joey for safety and denies thoughts of SI/HI. Amenable to plan. No acute concerns to address regarding medications. Disposition:  patient remains appropriate for outpatient level of care     Follow Up:  No follow-ups on file. Follow-up in 2 weeks, patient informed to call for follow-up and follow-up number provided. Patient will call in the interim for any side-effects, decompensation, questions, or problems between now and the next visit, or will call 911 or go to the ER for any more emergent concerns. Arlette Lan, [address] [place] [address]      Psychiatry     1 hour spent with patient in video/audio encounter        An  electronic signature was used to authenticate this note. PLEASE NOTE:??This document has been produced in part or whole using voice recognition software. Proofread has been done, however unrecognized errors in transcription may be present.   ?  Angus Schaffer MD

## 2022-07-21 DIAGNOSIS — K21.9 GASTROESOPHAGEAL REFLUX DISEASE WITHOUT ESOPHAGITIS: ICD-10-CM

## 2022-07-21 RX ORDER — FAMOTIDINE 20 MG/1
20 TABLET, FILM COATED ORAL 2 TIMES DAILY PRN
Qty: 180 TABLET | Refills: 0 | Status: SHIPPED | OUTPATIENT
Start: 2022-07-21 | End: 2022-08-09 | Stop reason: SDUPTHER

## 2022-07-21 NOTE — TELEPHONE ENCOUNTER
Rx requested:  Requested Prescriptions     Pending Prescriptions Disp Refills    famotidine (PEPCID) 20 MG tablet [Pharmacy Med Name: FAMOTIDINE 20 MG TABLET] 180 tablet 0     Sig: TAKE 1 TABLET BY MOUTH 2 TIMES DAILY AS NEEDED (GERD)         Last Office Visit:   7/11/2022      Next Visit Date:  Future Appointments   Date Time Provider Sammi Nataliya   7/26/2022 10:45 AM Laurent Opitz, APRN - [facility]   8/2/2022 10:00 AM VERÓNICA Marcos   9/6/2022 11:30 AM Vicki Jara MD [facility]

## 2022-07-25 RX ORDER — HYDROXYZINE 50 MG/1
TABLET, FILM COATED ORAL
Qty: 90 TABLET | Refills: 0 | Status: SHIPPED | OUTPATIENT
Start: 2022-07-25 | End: 2022-09-12

## 2022-07-26 ENCOUNTER — OFFICE VISIT (OUTPATIENT)
Dept: FAMILY MEDICINE CLINIC | Age: 57
End: 2022-07-26
Payer: MEDICARE

## 2022-07-26 VITALS
DIASTOLIC BLOOD PRESSURE: 82 MMHG | TEMPERATURE: 96.9 F | OXYGEN SATURATION: 96 % | HEART RATE: 88 BPM | SYSTOLIC BLOOD PRESSURE: 117 MMHG | HEIGHT: 62 IN | BODY MASS INDEX: 27.31 KG/M2 | WEIGHT: 148.4 LBS

## 2022-07-26 DIAGNOSIS — M96.1 POSTLAMINECTOMY SYNDROME, LUMBAR: ICD-10-CM

## 2022-07-26 DIAGNOSIS — M47.817 LUMBOSACRAL SPONDYLOSIS WITHOUT MYELOPATHY: ICD-10-CM

## 2022-07-26 DIAGNOSIS — L02.92 BOIL: Primary | ICD-10-CM

## 2022-07-26 PROCEDURE — 99214 OFFICE O/P EST MOD 30 MIN: CPT | Performed by: NURSE PRACTITIONER

## 2022-07-26 RX ORDER — PREGABALIN 100 MG/1
100 CAPSULE ORAL 2 TIMES DAILY
Qty: 60 CAPSULE | Refills: 2 | Status: SHIPPED | OUTPATIENT
Start: 2022-07-26 | End: 2022-08-09 | Stop reason: SDUPTHER

## 2022-07-26 RX ORDER — PREGABALIN 75 MG/1
75 CAPSULE ORAL 3 TIMES DAILY
Qty: 270 CAPSULE | Refills: 0 | Status: CANCELLED | OUTPATIENT
Start: 2022-07-26 | End: 2022-10-24

## 2022-07-26 RX ORDER — CEPHALEXIN 500 MG/1
500 CAPSULE ORAL 2 TIMES DAILY
Qty: 20 CAPSULE | Refills: 0 | Status: SHIPPED | OUTPATIENT
Start: 2022-07-26

## 2022-07-26 NOTE — PROGRESS NOTES
Subjective:      Patient ID: Kimani Garcia. Yang Camacho is a 55 y.o. female who presents today for:     Chief Complaint   Patient presents with    Chronic Pain     Patient presents today to follow up on chronic pain. Patient states the Lyrica is helping. Other     Patient c/o bump on inside of Rt groin area x 7 days. HPI Pt in today for f/u on chronic pain. She stopped gabapentin and changed to lyrica 75mg 3x daily. She reports significant improvement in saturnino fog and pain. No ill side effects. Reports cyst/boil in right groin for the last week. It was very painful. She has been using neosporin with a pain reliever. No drainage. It seems to be improving.      Past Medical History:   Diagnosis Date    Anxiety     Breast cancer ([address]     Bronchitis     Cancer ([address]     left breast    Chronic pain syndrome 10/6/2016    Colon polyps     Depression     suicide attempts    Duodenitis     Esophageal erosions     Family hx of colon cancer, Mother & Father 4/18/2017    Fibromyalgia     Foot deformity 10/6/2016    GERD (gastroesophageal reflux disease)     gastritis    History of therapeutic radiation     Hypertension     Lumbar radiculopathy 5/10/2017    Osteoarthritis     TMJ arthritis 10/6/2016     Past Surgical History:   Procedure Laterality Date    BONE BIOPSY Right 09/14/2018    CT Guided Right Rib Fine Needle Aspiration by DR Crystal Barrera    BREAST BIOPSY      BREAST ENHANCEMENT SURGERY      BREAST LUMPECTOMY      BREAST SURGERY      breast bx/ lumpectomy    COLONOSCOPY      DILATION AND CURETTAGE OF UTERUS      ENDOSCOPY, COLON, DIAGNOSTIC      HYSTERECTOMY (CERVIX STATUS UNKNOWN)  2012    LAPAROSCOPY      LUMBAR DISCECTOMY      L4-L5    OVARY REMOVAL      NC COLORECTAL SCRN; HI RISK IND N/A 4/18/2018    COLONOSCOPY performed by Moris Lopez MD at [address] ESOPHAGOGASTRODUODENOSCOPY TRANSORAL DIAGNOSTIC N/A 5/11/2017    EGD ESOPHAGOGASTRODUODENOSCOPY performed by Moris Lopez MD at MG tablet TAKE 1 TABLET BY MOUTH 2 TIMES DAILY AS NEEDED (GERD) 180 tablet 0    nortriptyline (PAMELOR) 10 MG capsule Take 2 capsules by mouth nightly 180 capsule 1    VILAZODONE HCL 20 MG Tablet Take 1 tablet by mouth in the morning. 90 tablet 1    buPROPion (WELLBUTRIN XL) 150 MG extended release tablet Take 1 tablet by mouth every morning 90 tablet 1    Handicap Placard MISC by Does not apply route DX lumbosacral spondylosis with myopathy Exp 7/11/27 1 each 0    pantoprazole (PROTONIX) 40 MG tablet TAKE 1 TABLET EVERY DAY 90 tablet 1    SYMBICORT 160-4.5 MCG/ACT AERO TAKE 2 PUFFS BY MOUTH TWICE A DAY 10.2 each 5    dicyclomine (BENTYL) 10 MG capsule TAKE 1 CAPSULE BY MOUTH 4 TIMES DAILY (BEFORE MEALS AND NIGHTLY). (Patient taking differently: Take 10 mg by mouth in the morning and at bedtime) 360 capsule 1    albuterol sulfate HFA (PROAIR HFA) 108 (90 Base) MCG/ACT inhaler Inhale 2 puffs into the lungs every 6 hours as needed for Wheezing 1 each 3    tiZANidine (ZANAFLEX) 4 MG tablet Take 1 tablet by mouth nightly as needed (muscle spasm) 90 tablet 1    lisinopril-hydroCHLOROthiazide (PRINZIDE;ZESTORETIC) 20-12.5 MG per tablet TAKE 1 TABLET EVERY DAY 90 tablet 3    atorvastatin (LIPITOR) 10 MG tablet TAKE 1 TABLET EVERY DAY 90 tablet 3    predniSONE (DELTASONE) 10 MG tablet Take 3 tab po day before and day of flying (Patient not taking: Reported on 7/26/2022) 12 tablet 1     No current facility-administered medications on file prior to visit. Allergies:  Ultram [tramadol], Norco [hydrocodone-acetaminophen], and Percocet [oxycodone-acetaminophen]    Review of Systems   Constitutional:  Negative for chills, fatigue and fever. HENT:  Negative for congestion. Respiratory:  Negative for cough and shortness of breath. Cardiovascular:  Negative for chest pain and palpitations. Musculoskeletal:  Positive for arthralgias and myalgias. Skin:  Positive for color change and rash.  Negative for pallor and wound.   Neurological:  Negative for dizziness. Psychiatric/Behavioral:  Negative for self-injury and suicidal ideas. Objective:   /82 (Site: Left Upper Arm, Position: Sitting, Cuff Size: Medium Adult)   Pulse 88   Temp 96.9 °F (36.1 °C) (Temporal)   Ht 5' 2\" (1.575 m)   Wt 148 lb 6.4 oz (67.3 kg)   SpO2 96%   BMI 27.14 kg/m²     Physical Exam  Constitutional:       Appearance: She is well-developed. HENT:      Head: Normocephalic. Right Ear: External ear normal.      Left Ear: External ear normal.      Nose: Nose normal.      Mouth/Throat:      Mouth: Mucous membranes are moist.      Pharynx: Oropharynx is clear. Eyes:      Conjunctiva/sclera: Conjunctivae normal.   Cardiovascular:      Rate and Rhythm: Normal rate and regular rhythm. Heart sounds: Normal heart sounds. Pulmonary:      Effort: Pulmonary effort is normal.      Breath sounds: Normal breath sounds. Musculoskeletal:         General: Normal range of motion. Cervical back: Normal range of motion. Skin:     General: Skin is warm and dry. Findings: Erythema and rash present. Rash is pustular. Neurological:      Mental Status: She is alert and oriented to person, place, and time. Psychiatric:         Mood and Affect: Mood normal.         Behavior: Behavior normal.       Assessment:          Diagnosis Orders   1. Boil  cephALEXin (KEFLEX) 500 MG capsule      2. Lumbosacral spondylosis without myelopathy  pregabalin (LYRICA) 100 MG capsule      3. Postlaminectomy syndrome, lumbar  pregabalin (LYRICA) 100 MG capsule          Plan:      No orders of the defined types were placed in this encounter. Orders Placed This Encounter   Medications    pregabalin (LYRICA) 100 MG capsule     Sig: Take 1 capsule by mouth in the morning and 1 capsule before bedtime. Do all this for 90 days.      Dispense:  60 capsule     Refill:  2    cephALEXin (KEFLEX) 500 MG capsule     Sig: Take 1 capsule by mouth in the morning and 1 capsule before bedtime. Dispense:  20 capsule     Refill:  0       Return in about 3 months (around 10/26/2022), or if symptoms worsen or fail to improve. 1. Lumbosacral spondylosis without myelopathy  Significant improvement. Difficult time taking middle of the day dose. Will adjust to 100mg twice daily. PDMP reviewed. - pregabalin (LYRICA) 100 MG capsule; Take 1 capsule by mouth in the morning and 1 capsule before bedtime. Do all this for 90 days. Dispense: 60 capsule; Refill: 2    2. Postlaminectomy syndrome, lumbar    - pregabalin (LYRICA) 100 MG capsule; Take 1 capsule by mouth in the morning and 1 capsule before bedtime. Do all this for 90 days. Dispense: 60 capsule; Refill: 2    3. Boil  Warm compress. Watch for any worsening signs of infection and f/u or ER if severe. - cephALEXin (KEFLEX) 500 MG capsule; Take 1 capsule by mouth in the morning and 1 capsule before bedtime. Dispense: 20 capsule; Refill: 0    Reviewed with the patient: current clinicalstatus, medications, activities and diet. Side effects, adverse effects of the medication prescribedtoday, as well as treatment plan/ rationale and result expectations have been discussedwith the patient who expresses understanding and desires to proceed. Close follow upto evaluate treatment results and for coordination of care. I have reviewedthe patient's medical history in detail and updated the computerized patient record.     Will Jean Baptiste, VERÓNICA - CNP

## 2022-07-31 ASSESSMENT — ENCOUNTER SYMPTOMS
COLOR CHANGE: 1
COUGH: 0
SHORTNESS OF BREATH: 0

## 2022-08-02 ENCOUNTER — OFFICE VISIT (OUTPATIENT)
Dept: GASTROENTEROLOGY | Age: 57
End: 2022-08-02
Payer: MEDICARE

## 2022-08-02 ENCOUNTER — PATIENT MESSAGE (OUTPATIENT)
Dept: BEHAVIORAL/MENTAL HEALTH CLINIC | Age: 57
End: 2022-08-02

## 2022-08-02 VITALS — OXYGEN SATURATION: 99 % | WEIGHT: 148 LBS | HEIGHT: 62 IN | BODY MASS INDEX: 27.23 KG/M2 | HEART RATE: 61 BPM

## 2022-08-02 DIAGNOSIS — K21.9 GASTROESOPHAGEAL REFLUX DISEASE WITHOUT ESOPHAGITIS: ICD-10-CM

## 2022-08-02 DIAGNOSIS — K92.9 FUNCTIONAL GASTROINTESTINAL DISORDER: Primary | ICD-10-CM

## 2022-08-02 DIAGNOSIS — E73.9 LACTOSE INTOLERANCE: ICD-10-CM

## 2022-08-02 DIAGNOSIS — R10.30 LOWER ABDOMINAL PAIN: ICD-10-CM

## 2022-08-02 DIAGNOSIS — K58.0 IRRITABLE BOWEL SYNDROME WITH DIARRHEA: ICD-10-CM

## 2022-08-02 DIAGNOSIS — Z80.0 FAMILY HISTORY OF COLON CANCER: ICD-10-CM

## 2022-08-02 PROCEDURE — 3017F COLORECTAL CA SCREEN DOC REV: CPT | Performed by: NURSE PRACTITIONER

## 2022-08-02 PROCEDURE — 1036F TOBACCO NON-USER: CPT | Performed by: NURSE PRACTITIONER

## 2022-08-02 PROCEDURE — G8427 DOCREV CUR MEDS BY ELIG CLIN: HCPCS | Performed by: NURSE PRACTITIONER

## 2022-08-02 PROCEDURE — G8419 CALC BMI OUT NRM PARAM NOF/U: HCPCS | Performed by: NURSE PRACTITIONER

## 2022-08-02 PROCEDURE — 99213 OFFICE O/P EST LOW 20 MIN: CPT | Performed by: NURSE PRACTITIONER

## 2022-08-02 NOTE — PROGRESS NOTES
but continues to consume some ice cream and cheese. She does not do her own cooking and hence unable to prevent bringing in some of the foods. She has been on a Mediterranean diet over the last few months because of her 's illness, this has not made any improvement in her symptoms, she has in fact had increased abdominal cramping and pain while being on Mediterranean diet. Patient has chronic pain, has significant back pain, headaches, body aches, on medical marijuana. Currently on Pamelor 10 mg once daily, Bentyl 10 mg twice daily  She has depression, stress and anxiety. Was seeing a psychiatrist previously, currently psych medications being managed by her primary care provider. Ongoing issues with depression, no current suicidal ideation, previous suicide attempt in      HPI from last GI clinic visit on 2021 summarized below:  48 y.o. female following up after last GI clinic on 2020  with functional gastrointestinal disorder. Patient forgot the last 2 appointments, ran out of her Pamelor. Patient reports no episodes since March after she started eating banana and a 2 by me on a daily basis. She clearly has exacerbation of her symptoms with episodes of anxiety. She denies any significant change in her health, has intentionally lost some weight. Uses Bentyl 1 tab twice daily. Patient additionally on Lexapro Wellbutrin and Neurontin. HPI from last GI clinic visit on 2020  summarized below:  Patient reports a daily soft formed BM. Taking dicyclomine daily with good response, no complaints of abdominal pain. No weight loss or loss of appetite. She denies melena or hematochezia. Patient correlating abdominal pain and loose stools with stressful life events. On  she attended her aunt's  and experienced lower abdominal cramping with 4-5 loose stools throughout the day. No melena or hematochezia. Symptoms subside after 1 to 2 days.   Patient denies nausea or vomiting. Takes PPI daily with good response to GERD throughout the day, symptoms return at night. No dysphagia. No chest pain, shortness of breath, or palpitations. No fever or chills. Previous GI work up/Endoscopic investigations:   Colonoscopy 8/20/2019-erythematous mucosa in the sigmoid colon. Biopsied. The rectum, descending colon, splenic flexure, transverse colon hepatic flexure, and cecum are normal.  Biopsied. The examined portion of the ileowas normal.  Pathology: Right colon random cold biopsy: Colonic mucosa with no significant pathological findings. Descending/sigmoid cold biopsy: Colonic mucosa with no significant pathology. Descending/sigmoid colon biopsy: Colonic mucosa with focal congestion in lamina propria and reactive changes in the epithelium. EGD 8/2019- EGD 8/27/2019- salmon-colored mucosa/inlet patches. Normal stomach. normal examined duodenum    Review of Systems   All other systems reviewed and are negative. Past medical history, past surgical history, medication list, social and familyhistory reviewed    Pulse 61, height 5' 2\" (1.575 m), weight 148 lb (67.1 kg), SpO2 99 %, not currently breastfeeding. Physical Exam  Constitutional:       General: She is not in acute distress. Appearance: Normal appearance. She is normal weight. She is not ill-appearing. HENT:      Head: Normocephalic and atraumatic. Eyes:      General: No scleral icterus. Cardiovascular:      Rate and Rhythm: Normal rate and regular rhythm. Pulses: Normal pulses. Pulmonary:      Effort: Pulmonary effort is normal. No respiratory distress. Breath sounds: Normal breath sounds. Abdominal:      General: Bowel sounds are normal. There is no distension. Palpations: Abdomen is soft. There is no mass. Tenderness: There is no abdominal tenderness. There is no guarding or rebound. Musculoskeletal:         General: Normal range of motion.    Lymphadenopathy:      Cervical: No may increase if worsening symptoms     5. Family history of colon cancer  -Colonoscopy next due August 2024     6. Gastroesophageal reflux disease without esophagitis  -Continue with Protonix and Pepcid  - Anti-reflux lifestyle modification discussed at length  -No indication for EGD at this time, no alarm symptoms           Return if symptoms worsen or fail to improve. VERÓNICA Cotto - CNP   Staff Gastroenterology Nurse Practitioner  [facility]    Please note this report has been partially produced using speech recognition software and contain errors related to that system including grammar, punctuation and spelling as well as words and phrases that may seem inappropriate. If there are questions or concerns please feel free to contact me to clarify.

## 2022-08-03 NOTE — TELEPHONE ENCOUNTER
From: Mendy Costa  To: Dr. Stein Rivero: 8/2/2022 3:00 PM EDT  Subject: Sadiq Burton order needed    [employer] cancelled the order you sent for my Viibryd because they had initially denied it. So... I need you to send [employer] a new order for my Viibryd. So sorry for the bother.   Thank you  Marian Olvera

## 2022-08-04 DIAGNOSIS — M25.511 ACUTE PAIN OF RIGHT SHOULDER: ICD-10-CM

## 2022-08-04 RX ORDER — TIZANIDINE 4 MG/1
4 TABLET ORAL NIGHTLY PRN
Qty: 90 TABLET | Refills: 1 | Status: SHIPPED | OUTPATIENT
Start: 2022-08-04

## 2022-08-04 NOTE — TELEPHONE ENCOUNTER
requesting medication refill.  Please approve or deny this request.    Rx requested:  Requested Prescriptions     Pending Prescriptions Disp Refills    tiZANidine (Samia Bishop) 4 MG tablet [Pharmacy Med Name: TIZANIDINE HYDROCHLORIDE 4 MG Tablet] 90 tablet 1     Sig: TAKE 1 TABLET BY MOUTH NIGHTLY AS NEEDED (MUSCLE SPASM)         Last Office Visit:   7/26/2022      Next Visit Date:  Future Appointments   Date Time Provider Sammi Arcosisti   9/6/2022 11:30 AM Rigoberto Sarabia MD [facility]  [facility]   10/26/2022 10:45 AM Marielle Tang, [address]

## 2022-08-04 NOTE — TELEPHONE ENCOUNTER
Rx requested:  Requested Prescriptions     Pending Prescriptions Disp Refills    dicyclomine (BENTYL) 10 MG capsule [Pharmacy Med Name: DICYCLOMINE HYDROCHLORIDE 10 MG Capsule] 360 capsule 1     Sig: TAKE 1 CAPSULE FOUR TIMES DAILY AS NEEDED (BEFORE MEALS AND NIGHTLY)         Last Office Visit:   7/26/2022      Next Visit Date:  Future Appointments   Date Time Provider Sammi An   9/6/2022 11:30 AM Sadie Grullon MD [facility]  [facility]   10/26/2022 10:45 AM Fabian Crawford, [address]

## 2022-08-05 RX ORDER — DICYCLOMINE HYDROCHLORIDE 10 MG/1
CAPSULE ORAL
Qty: 360 CAPSULE | Refills: 1 | Status: SHIPPED | OUTPATIENT
Start: 2022-08-05

## 2022-08-09 DIAGNOSIS — K21.9 GASTROESOPHAGEAL REFLUX DISEASE WITHOUT ESOPHAGITIS: ICD-10-CM

## 2022-08-09 DIAGNOSIS — M47.817 LUMBOSACRAL SPONDYLOSIS WITHOUT MYELOPATHY: ICD-10-CM

## 2022-08-09 DIAGNOSIS — M96.1 POSTLAMINECTOMY SYNDROME, LUMBAR: ICD-10-CM

## 2022-08-09 DIAGNOSIS — J44.9 CHRONIC OBSTRUCTIVE PULMONARY DISEASE, UNSPECIFIED COPD TYPE (HCC): ICD-10-CM

## 2022-08-09 RX ORDER — PREGABALIN 100 MG/1
100 CAPSULE ORAL 2 TIMES DAILY
Qty: 60 CAPSULE | Refills: 2 | Status: SHIPPED | OUTPATIENT
Start: 2022-08-09 | End: 2022-11-07

## 2022-08-09 RX ORDER — FAMOTIDINE 20 MG/1
20 TABLET, FILM COATED ORAL 2 TIMES DAILY PRN
Qty: 180 TABLET | Refills: 0 | Status: SHIPPED | OUTPATIENT
Start: 2022-08-09 | End: 2022-10-14

## 2022-08-09 RX ORDER — BUDESONIDE AND FORMOTEROL FUMARATE DIHYDRATE 160; 4.5 UG/1; UG/1
AEROSOL RESPIRATORY (INHALATION)
Qty: 10.2 EACH | Refills: 5 | Status: SHIPPED | OUTPATIENT
Start: 2022-08-09

## 2022-08-12 RX ORDER — DICYCLOMINE HYDROCHLORIDE 10 MG/1
CAPSULE ORAL
Qty: 360 CAPSULE | Refills: 1 | OUTPATIENT
Start: 2022-08-12

## 2022-08-14 DIAGNOSIS — E78.5 DYSLIPIDEMIA: ICD-10-CM

## 2022-08-15 RX ORDER — ATORVASTATIN CALCIUM 10 MG/1
TABLET, FILM COATED ORAL
Qty: 90 TABLET | Refills: 3 | Status: SHIPPED | OUTPATIENT
Start: 2022-08-15 | End: 2022-10-06

## 2022-09-10 NOTE — TELEPHONE ENCOUNTER
requesting medication refill.  Please approve or deny this request.    Rx requested:  Requested Prescriptions     Pending Prescriptions Disp Refills    hydrOXYzine HCl (ATARAX) 50 MG tablet [Pharmacy Med Name: HYDROXYZINE HCL 50 MG Tablet] 90 tablet 0     Sig: TAKE 1 TABLET THREE TIMES DAILY AS NEEDED FOR ITCHING         Last Office Visit:   7/26/2022      Next Visit Date:  Future Appointments   Date Time Provider Sammi An   10/4/2022 12:00 PM Becki Harmon MD [facility]  [facility]   10/26/2022 10:45 AM John Talamantes, [address]

## 2022-09-12 RX ORDER — HYDROXYZINE 50 MG/1
TABLET, FILM COATED ORAL
Qty: 90 TABLET | Refills: 0 | Status: SHIPPED | OUTPATIENT
Start: 2022-09-12

## 2022-10-06 DIAGNOSIS — E78.5 DYSLIPIDEMIA: ICD-10-CM

## 2022-10-06 DIAGNOSIS — I10 ESSENTIAL HYPERTENSION: ICD-10-CM

## 2022-10-06 RX ORDER — LISINOPRIL AND HYDROCHLOROTHIAZIDE 20; 12.5 MG/1; MG/1
TABLET ORAL
Qty: 90 TABLET | Refills: 3 | Status: SHIPPED | OUTPATIENT
Start: 2022-10-06

## 2022-10-06 RX ORDER — ATORVASTATIN CALCIUM 10 MG/1
TABLET, FILM COATED ORAL
Qty: 90 TABLET | Refills: 3 | Status: SHIPPED | OUTPATIENT
Start: 2022-10-06

## 2022-10-06 NOTE — TELEPHONE ENCOUNTER
Rx requested:  Requested Prescriptions     Pending Prescriptions Disp Refills    atorvastatin (LIPITOR) 10 MG tablet [Pharmacy Med Name: ATORVASTATIN CALCIUM 10 MG Tablet] 90 tablet 3     Sig: TAKE 1 TABLET EVERY DAY    lisinopril-hydroCHLOROthiazide (PRINZIDE;ZESTORETIC) 20-12.5 MG per tablet [Pharmacy Med Name: LISINOPRIL/HYDROCHLOROTHIAZIDE 20-12.5 MG Tablet] 90 tablet 3     Sig: TAKE 1 TABLET EVERY DAY         Last Office Visit:   7/26/2022      Next Visit Date:  Future Appointments   Date Time Provider Sammi An   10/26/2022 10:45 AM Lluvia Ramirez, [address]

## 2022-10-13 DIAGNOSIS — K21.9 GASTROESOPHAGEAL REFLUX DISEASE WITHOUT ESOPHAGITIS: ICD-10-CM

## 2022-10-13 NOTE — TELEPHONE ENCOUNTER
Rx requested:  Requested Prescriptions     Pending Prescriptions Disp Refills    famotidine (PEPCID) 20 MG tablet [Pharmacy Med Name: FAMOTIDINE 20 MG TABLET] 180 tablet 0     Sig: TAKE 1 TABLET BY MOUTH 2 TIMES DAILY AS NEEDED (GERD)         Last Office Visit:   7/26/2022      Next Visit Date:  Future Appointments   Date Time Provider Sammi An   10/26/2022 10:45 AM Lluvia Senior, [address]

## 2022-10-14 RX ORDER — FAMOTIDINE 20 MG/1
20 TABLET, FILM COATED ORAL 2 TIMES DAILY PRN
Qty: 180 TABLET | Refills: 0 | Status: SHIPPED | OUTPATIENT
Start: 2022-10-14

## 2022-12-09 DIAGNOSIS — K21.9 GASTROESOPHAGEAL REFLUX DISEASE WITHOUT ESOPHAGITIS: ICD-10-CM

## 2022-12-09 RX ORDER — FAMOTIDINE 20 MG/1
TABLET, FILM COATED ORAL
Qty: 180 TABLET | Refills: 0 | Status: SHIPPED | OUTPATIENT
Start: 2022-12-09

## 2022-12-09 RX ORDER — BUPROPION HYDROCHLORIDE 150 MG/1
TABLET ORAL
Qty: 90 TABLET | Refills: 1 | Status: SHIPPED | OUTPATIENT
Start: 2022-12-09

## 2022-12-09 NOTE — TELEPHONE ENCOUNTER
Rx requested:  Requested Prescriptions     Pending Prescriptions Disp Refills    famotidine (PEPCID) 20 MG tablet [Pharmacy Med Name: FAMOTIDINE 20 MG Tablet] 180 tablet 0     Sig: TAKE 1 TABLET TWICE DAILY AS NEEDED FOR GERD         Last Office Visit:   7/26/2022      Next Visit Date:  No future appointments.

## 2022-12-13 ENCOUNTER — RX ONLY (OUTPATIENT)
Age: 57
Setting detail: RX ONLY
End: 2022-12-13

## 2022-12-13 ENCOUNTER — APPOINTMENT (RX ONLY)
Dept: URBAN - METROPOLITAN AREA CLINIC 304 | Facility: CLINIC | Age: 57
Setting detail: DERMATOLOGY
End: 2022-12-13

## 2022-12-13 ENCOUNTER — APPOINTMENT (RX ONLY)
Dept: URBAN - METROPOLITAN AREA CLINIC 303 | Facility: CLINIC | Age: 57
Setting detail: DERMATOLOGY
End: 2022-12-13

## 2022-12-13 PROBLEM — C44.722 SQUAMOUS CELL CARCINOMA OF SKIN OF RIGHT LOWER LIMB, INCLUDING HIP: Status: ACTIVE | Noted: 2022-12-13

## 2022-12-13 PROCEDURE — 17313 MOHS 1 STAGE T/A/L: CPT

## 2022-12-13 PROCEDURE — ? MOHS SURGERY

## 2022-12-13 PROCEDURE — 13121 CMPLX RPR S/A/L 2.6-7.5 CM: CPT

## 2022-12-13 RX ORDER — MUPIROCIN 20 MG/G
OINTMENT TOPICAL
Qty: 15 | Refills: 2 | Status: ERX | COMMUNITY
Start: 2022-12-13

## 2022-12-13 NOTE — PROCEDURE: MOHS SURGERY
Mohs Case Number: [id number] [Visual inspection, sensory exam] : Foot exam, including visual inspection, sensory exam with mono filament, and pulse exam, was performed within the last 12 months

## 2022-12-13 NOTE — HPI: SKIN LESIONS
Have Your Skin Lesions Been Treated?: been treated
Is This A New Presentation, Or A Follow-Up?: Skin Lesion
When Was It Treated?: 10/06/2022

## 2022-12-13 NOTE — PROCEDURE: MOHS SURGERY
Patient: Girish Chauhan    Procedure(s):  ESOPHAGOGASTRODUODENOSCOPY (EGD), WITH STENT EXCHANGE AND PLACEMENT,  AND BIOPSY    Diagnosis: Abdominal pain, generalized [R10.84]  Diagnosis Additional Information: No value filed.    Anesthesia Type:   No value filed.     Note:    Oropharynx: oropharynx clear of all foreign objects and spontaneously breathing  Level of Consciousness: awake  Oxygen Supplementation: face mask  Level of Supplemental Oxygen (L/min / FiO2): 4  Independent Airway: airway patency satisfactory and stable  Dentition: dentition unchanged  Vital Signs Stable: post-procedure vital signs reviewed and stable  Report to RN Given: handoff report given  Patient transferred to: PACU  Comments: VSS, report given to RN.    Handoff Report: Identifed the Patient, Identified the Reponsible Provider, Reviewed the pertinent medical history, Discussed the surgical course, Reviewed Intra-OP anesthesia mangement and issues during anesthesia, Set expectations for post-procedure period and Allowed opportunity for questions and acknowledgement of understanding      Vitals: (Last set prior to Anesthesia Care Transfer)  CRNA VITALS  4/9/2021 0913 - 4/9/2021 0948      4/9/2021             Resp Rate (observed):  HR  BP  Sat  12  87  116/82  100        Electronically Signed By: YO Cao CRNA  April 9, 2021  9:48 AM   Home Suture Removal Text: Patient was provided instructions on removing sutures and will remove their sutures at home.  If they have any questions or difficulties they will call the office.

## 2022-12-27 ENCOUNTER — APPOINTMENT (RX ONLY)
Dept: URBAN - METROPOLITAN AREA CLINIC 303 | Facility: CLINIC | Age: 57
Setting detail: DERMATOLOGY
End: 2022-12-27

## 2022-12-27 DIAGNOSIS — K29.00 ACUTE GASTRITIS, PRESENCE OF BLEEDING UNSPECIFIED, UNSPECIFIED GASTRITIS TYPE: ICD-10-CM

## 2022-12-27 DIAGNOSIS — J44.9 CHRONIC OBSTRUCTIVE PULMONARY DISEASE, UNSPECIFIED COPD TYPE (HCC): ICD-10-CM

## 2022-12-27 DIAGNOSIS — Z48.02 ENCOUNTER FOR REMOVAL OF SUTURES: ICD-10-CM

## 2022-12-27 PROCEDURE — ? SUTURE REMOVAL (GLOBAL PERIOD)

## 2022-12-27 PROCEDURE — 99024 POSTOP FOLLOW-UP VISIT: CPT

## 2022-12-27 RX ORDER — PANTOPRAZOLE SODIUM 40 MG/1
TABLET, DELAYED RELEASE ORAL
Qty: 90 TABLET | Refills: 1 | Status: SHIPPED | OUTPATIENT
Start: 2022-12-27

## 2022-12-27 RX ORDER — BUDESONIDE AND FORMOTEROL FUMARATE DIHYDRATE 160; 4.5 UG/1; UG/1
AEROSOL RESPIRATORY (INHALATION)
Qty: 3 EACH | Refills: 1 | Status: SHIPPED | OUTPATIENT
Start: 2022-12-27

## 2022-12-27 ASSESSMENT — LOCATION DETAILED DESCRIPTION DERM
LOCATION DETAILED: RIGHT DISTAL PRETIBIAL REGION
LOCATION DETAILED: RIGHT DISTAL PRETIBIAL REGION

## 2022-12-27 ASSESSMENT — LOCATION ZONE DERM
LOCATION ZONE: LEG
LOCATION ZONE: LEG

## 2022-12-27 ASSESSMENT — LOCATION SIMPLE DESCRIPTION DERM
LOCATION SIMPLE: RIGHT PRETIBIAL REGION
LOCATION SIMPLE: RIGHT PRETIBIAL REGION

## 2022-12-27 NOTE — PROCEDURE: SUTURE REMOVAL (GLOBAL PERIOD)
Detail Level: Detailed
Add 95715 Cpt? (Important Note: In 2017 The Use Of 98416 Is Being Tracked By Cms To Determine Future Global Period Reimbursement For Global Periods): yes

## 2022-12-27 NOTE — TELEPHONE ENCOUNTER
Future Appointments    This patient does not currently have any appointments scheduled.   Past Visits    Date Provider Specialty Visit Type Primary Dx   08/02/2022 VERÓNICA Duong CNP Gastroenterology Office Visit Functional gastrointestinal disorder   07/26/2022 Jessica Vazquze Office Visit Boil

## 2023-01-04 NOTE — TELEPHONE ENCOUNTER
Rx requested:  Requested Prescriptions     Pending Prescriptions Disp Refills    budesonide-formoterol (SYMBICORT) 160-4.5 MCG/ACT AERO 10.2 each 5     Sig: TAKE 2 PUFFS BY MOUTH TWICE A DAY    famotidine (PEPCID) 20 MG tablet 180 tablet 0     Sig: Take 1 tablet by mouth 2 times daily as needed (GERD)    pregabalin (LYRICA) 100 MG capsule 60 capsule 2     Sig: Take 1 capsule by mouth in the morning and 1 capsule before bedtime. Do all this for 90 days.          Last Office Visit:   7/26/2022      Next Visit Date:  Future Appointments   Date Time Provider Sammi An   9/6/2022 11:30 AM Andrew Boone MD [facility]  [facility]   10/26/2022 10:45 AM Solange Tian, [address]

## 2023-01-17 RX ORDER — HYDROXYZINE 50 MG/1
TABLET, FILM COATED ORAL
Qty: 90 TABLET | Refills: 0 | Status: SHIPPED | OUTPATIENT
Start: 2023-01-17

## 2023-01-17 NOTE — TELEPHONE ENCOUNTER
Rx requested:  Requested Prescriptions     Pending Prescriptions Disp Refills    hydrOXYzine HCl (ATARAX) 50 MG tablet [Pharmacy Med Name: HYDROXYZINE HCL 50 MG Tablet] 90 tablet 0     Sig: TAKE 1 TABLET THREE TIMES DAILY AS NEEDED FOR ITCHING         Last Office Visit:   7/26/2022      Next Visit Date:  No future appointments.

## 2023-03-31 RX ORDER — HYDROXYZINE 50 MG/1
TABLET, FILM COATED ORAL
Qty: 90 TABLET | Refills: 0 | Status: SHIPPED | OUTPATIENT
Start: 2023-03-31

## 2023-03-31 RX ORDER — DICYCLOMINE HYDROCHLORIDE 10 MG/1
CAPSULE ORAL
Qty: 360 CAPSULE | Refills: 1 | Status: SHIPPED | OUTPATIENT
Start: 2023-03-31

## 2023-03-31 NOTE — TELEPHONE ENCOUNTER
Pharmacy requesting medication refill. Please approve or deny this request.    Rx requested:  Requested Prescriptions     Pending Prescriptions Disp Refills    hydrOXYzine HCl (ATARAX) 50 MG tablet [Pharmacy Med Name: HYDROXYZINE HCL 50 MG Tablet] 90 tablet 0     Sig: TAKE 1 TABLET THREE TIMES DAILY AS NEEDED FOR ITCHING    dicyclomine (BENTYL) 10 MG capsule [Pharmacy Med Name: DICYCLOMINE HYDROCHLORIDE 10 MG Capsule] 360 capsule 1     Sig: TAKE 1 CAPSULE FOUR TIMES DAILY AS NEEDED (BEFORE MEALS AND NIGHTLY)         Last Office Visit:   7/26/2022      Next Visit Date:  No future appointments.

## 2023-08-05 DIAGNOSIS — K29.00 ACUTE GASTRITIS, PRESENCE OF BLEEDING UNSPECIFIED, UNSPECIFIED GASTRITIS TYPE: ICD-10-CM

## 2023-08-07 RX ORDER — PANTOPRAZOLE SODIUM 40 MG/1
TABLET, DELAYED RELEASE ORAL
Qty: 90 TABLET | Refills: 1 | OUTPATIENT
Start: 2023-08-07

## 2023-09-30 DIAGNOSIS — I10 ESSENTIAL HYPERTENSION: ICD-10-CM

## 2023-09-30 DIAGNOSIS — E78.5 DYSLIPIDEMIA: ICD-10-CM

## 2023-10-02 RX ORDER — ATORVASTATIN CALCIUM 10 MG/1
TABLET, FILM COATED ORAL
Qty: 90 TABLET | Refills: 3 | OUTPATIENT
Start: 2023-10-02

## 2023-10-02 RX ORDER — BUPROPION HYDROCHLORIDE 150 MG/1
TABLET ORAL
Qty: 90 TABLET | Refills: 1 | OUTPATIENT
Start: 2023-10-02

## 2023-10-02 RX ORDER — LISINOPRIL AND HYDROCHLOROTHIAZIDE 20; 12.5 MG/1; MG/1
TABLET ORAL
Qty: 90 TABLET | Refills: 3 | OUTPATIENT
Start: 2023-10-02

## 2024-05-09 RX ORDER — DICYCLOMINE HYDROCHLORIDE 10 MG/1
CAPSULE ORAL
Qty: 360 CAPSULE | Refills: 3 | OUTPATIENT
Start: 2024-05-09